# Patient Record
Sex: FEMALE | Race: WHITE | NOT HISPANIC OR LATINO | Employment: OTHER | URBAN - METROPOLITAN AREA
[De-identification: names, ages, dates, MRNs, and addresses within clinical notes are randomized per-mention and may not be internally consistent; named-entity substitution may affect disease eponyms.]

---

## 2017-09-08 ENCOUNTER — HOSPITAL ENCOUNTER (EMERGENCY)
Facility: CLINIC | Age: 42
Discharge: HOME | End: 2017-09-09
Attending: EMERGENCY MEDICINE | Admitting: EMERGENCY MEDICINE
Payer: MEDICAID

## 2017-09-08 ENCOUNTER — APPOINTMENT (OUTPATIENT)
Dept: GENERAL RADIOLOGY | Facility: CLINIC | Age: 42
End: 2017-09-08
Attending: EMERGENCY MEDICINE
Payer: MEDICAID

## 2017-09-08 DIAGNOSIS — J20.9 ACUTE BRONCHITIS, UNSPECIFIED ORGANISM: ICD-10-CM

## 2017-09-08 DIAGNOSIS — J98.01 ACUTE BRONCHOSPASM: ICD-10-CM

## 2017-09-08 LAB
ANION GAP SERPL CALCULATED.3IONS-SCNC: 6 MMOL/L (ref 3–14)
BASOPHILS # BLD AUTO: 0.1 10E9/L (ref 0–0.2)
BASOPHILS NFR BLD AUTO: 0.6 %
BUN SERPL-MCNC: 18 MG/DL (ref 7–30)
CALCIUM SERPL-MCNC: 9.1 MG/DL (ref 8.5–10.1)
CHLORIDE SERPL-SCNC: 105 MMOL/L (ref 94–109)
CO2 SERPL-SCNC: 25 MMOL/L (ref 20–32)
CREAT SERPL-MCNC: 0.84 MG/DL (ref 0.52–1.04)
DIFFERENTIAL METHOD BLD: ABNORMAL
EOSINOPHIL # BLD AUTO: 0.7 10E9/L (ref 0–0.7)
EOSINOPHIL NFR BLD AUTO: 5.2 %
ERYTHROCYTE [DISTWIDTH] IN BLOOD BY AUTOMATED COUNT: 13.6 % (ref 10–15)
GFR SERPL CREATININE-BSD FRML MDRD: 75 ML/MIN/1.7M2
GLUCOSE SERPL-MCNC: 95 MG/DL (ref 70–99)
HCT VFR BLD AUTO: 39.5 % (ref 35–47)
HGB BLD-MCNC: 13 G/DL (ref 11.7–15.7)
IMM GRANULOCYTES # BLD: 0 10E9/L (ref 0–0.4)
IMM GRANULOCYTES NFR BLD: 0.2 %
LYMPHOCYTES # BLD AUTO: 4.2 10E9/L (ref 0.8–5.3)
LYMPHOCYTES NFR BLD AUTO: 31.5 %
MCH RBC QN AUTO: 28.3 PG (ref 26.5–33)
MCHC RBC AUTO-ENTMCNC: 32.9 G/DL (ref 31.5–36.5)
MCV RBC AUTO: 86 FL (ref 78–100)
MONOCYTES # BLD AUTO: 1 10E9/L (ref 0–1.3)
MONOCYTES NFR BLD AUTO: 7.7 %
NEUTROPHILS # BLD AUTO: 7.2 10E9/L (ref 1.6–8.3)
NEUTROPHILS NFR BLD AUTO: 54.8 %
NRBC # BLD AUTO: 0 10*3/UL
NRBC BLD AUTO-RTO: 0 /100
PLATELET # BLD AUTO: 288 10E9/L (ref 150–450)
POTASSIUM SERPL-SCNC: 3.7 MMOL/L (ref 3.4–5.3)
RBC # BLD AUTO: 4.6 10E12/L (ref 3.8–5.2)
SODIUM SERPL-SCNC: 136 MMOL/L (ref 133–144)
WBC # BLD AUTO: 13.2 10E9/L (ref 4–11)

## 2017-09-08 PROCEDURE — 85025 COMPLETE CBC W/AUTO DIFF WBC: CPT | Performed by: EMERGENCY MEDICINE

## 2017-09-08 PROCEDURE — 71020 XR CHEST 2 VW: CPT

## 2017-09-08 PROCEDURE — 94640 AIRWAY INHALATION TREATMENT: CPT

## 2017-09-08 PROCEDURE — 80048 BASIC METABOLIC PNL TOTAL CA: CPT | Performed by: EMERGENCY MEDICINE

## 2017-09-08 PROCEDURE — 99285 EMERGENCY DEPT VISIT HI MDM: CPT | Mod: 25

## 2017-09-08 PROCEDURE — 93005 ELECTROCARDIOGRAM TRACING: CPT

## 2017-09-08 ASSESSMENT — ENCOUNTER SYMPTOMS
COUGH: 1
CHEST TIGHTNESS: 1
WHEEZING: 1
FEVER: 0

## 2017-09-08 NOTE — ED AVS SNAPSHOT
[facility] Emergency Department    [address]    [facility] [address]    Phone:  [phone]    Fax:  [phone]                                       Prasanth Coy   MRN: [id number]    Department:  [facility] Emergency Department   Date of Visit:  9/8/2017           After Visit Summary Signature Page     I have received my discharge instructions, and my questions have been answered. I have discussed any challenges I see with this plan with the nurse or doctor.    ..........................................................................................................................................  Patient/Patient Representative Signature      ..........................................................................................................................................  Patient Representative Print Name and Relationship to Patient    ..................................................               ................................................  Date                                            Time    ..........................................................................................................................................  Reviewed by Signature/Title    ...................................................              ..............................................  Date                                                            Time

## 2017-09-08 NOTE — ED AVS SNAPSHOT
[facility] Emergency Department    [address]    [facility] [address]    Phone:  [phone]    Fax:  [phone]                                       Prasanth Coy   MRN: [id number]    Department:  [facility] Emergency Department   Date of Visit:  9/8/2017           Patient Information     Date Of Birth          1975        Your diagnoses for this visit were:     Acute bronchitis, unspecified organism     Acute bronchospasm        You were seen by Sandro Adler MD.      Follow-up Information     Follow up with [facility] In 3 days.    Specialties:  Sports Medicine, Pain Management, Obstetrics & Gynecology, Pediatrics, Internal Medicine, Nephrology    Contact information:    [address] [place] [address]  [facility] [address] [phone]        Discharge Instructions       Discharge Instructions  Bronchitis    You were seen today for a chest infection or inflammation. If your provider decided this was due to a bacterial infection, you may need an antibiotic. Sometimes these are caused by a virus, and then an antibiotic will not help.     Generally, every Emergency Department visit should have a follow-up clinic visit with either a primary or a specialty clinic/provider. Please follow-up as instructed by your emergency provider today.    Return to the Emergency Department if:    Your breathing gets much worse.    You are very weak, or feel much more ill.    You develop new symptoms, such as chest pain.    You cough up blood.    You are vomiting (throwing up) enough that you cannot keep fluids or your medicine down.    What can I do to help myself?    Fill any prescriptions the provider gave you and take them right away--especially antibiotics. Be sure to finish the whole antibiotic prescription.    You may be given a prescription for an inhaler, which can help loosen tight air passages.  Use this as needed, but not more  often than directed. Inhalers work much better when used with a spacer.     You may be given a prescription for a steroid to reduce inflammation. Used long-term, these can have side effects, but for short-term use they are safe. You may notice restlessness or increased appetite.        You may use non-prescription cough or cold medicines. Cough medicines may help, but don t make the cough go away completely.     Avoid smoke, because this can make your symptoms worse. If you smoke, this may be a good time to quit! Consider using nicotine lozenges, gum, or patches to reduce cravings.     If you have a fever, Tylenol  (acetaminophen), Motrin  (ibuprofen), or Advil  (ibuprofen) may help bring fever down and may help you feel more comfortable. Be sure to read and follow the package directions, and ask your provider if you have questions.    Be sure to get your flu shot each year.  For certain ages, the pneumonia shot can help prevent pneumonia.  If you were given a prescription for medicine here today, be sure to read all of the information (including the package insert) that comes with your prescription.  This will include important information about the medicine, its side effects, and any warnings that you need to know about.  The pharmacist who fills the prescription can provide more information and answer questions you may have about the medicine.  If you have questions or concerns that the pharmacist cannot address, please call or return to the Emergency Department.     Remember that you can always come back to the Emergency Department if you are not able to see your regular provider in the amount of time listed above, if you get any new symptoms, or if there is anything that worries you.      24 Hour Appointment Hotline       To make an appointment at any [facility] call [phone] ([phone]). If you don't have a family doctor or clinic, we will help you find one. [facility] are conveniently  located to serve the needs of you and your family.             Review of your medicines      START taking        Dose / Directions Last dose taken    albuterol 108 (90 BASE) MCG/ACT Inhaler   Commonly known as:  PROAIR HFA/PROVENTIL HFA/VENTOLIN HFA   Dose:  2 puff   Quantity:  1 Inhaler        Inhale 2 puffs into the lungs every 6 hours as needed for shortness of breath / dyspnea or wheezing   Refills:  0        doxycycline 100 MG capsule   Commonly known as:  VIBRAMYCIN   Dose:  100 mg   Quantity:  14 capsule        Take 1 capsule (100 mg) by mouth 2 times daily for 7 days   Refills:  0          Our records show that you are taking the medicines listed below. If these are incorrect, please call your family doctor or clinic.        Dose / Directions Last dose taken    ALBUTEROL IN        Refills:  0                Prescriptions were sent or printed at these locations (2 Prescriptions)                   Other Prescriptions                Printed at Department/Unit printer (2 of 2)         albuterol (PROAIR HFA/PROVENTIL HFA/VENTOLIN HFA) 108 (90 BASE) MCG/ACT Inhaler               doxycycline (VIBRAMYCIN) 100 MG capsule                Procedures and tests performed during your visit     Basic metabolic panel (BMP)    CBC + differential    EKG 12 lead    XR Chest 2 Views      Orders Needing Specimen Collection     None      Pending Results     Date and Time Order Name Status Description    9/8/2017 2305 XR Chest 2 Views Preliminary             Pending Culture Results     No orders found for last 3 day(s).            Pending Results Instructions     If you had any lab results that were not finalized at the time of your Discharge, you can call the ED Lab Result RN at [phone]. You will be contacted by this team for any positive Lab results or changes in treatment. The nurses are available 7 days a week from 10A to 6:30P.  You can leave a message 24 hours per day and they will return your call.        Test Results  From Your Hospital Stay        9/8/2017 11:50 PM      Narrative     XR CHEST 2 VW  9/8/2017 11:36 PM      HISTORY: Dyspnea.     COMPARISON: None.    FINDINGS: The heart size is normal. The lungs are clear. No  pneumothorax or pleural effusion.        Impression     IMPRESSION: No acute abnormality.         9/8/2017 11:27 PM      Component Results     Component Value Ref Range & Units Status    WBC 13.2 (H) 4.0 - 11.0 10e9/L Final    RBC Count 4.60 3.8 - 5.2 10e12/L Final    Hemoglobin 13.0 11.7 - 15.7 g/dL Final    Hematocrit 39.5 35.0 - 47.0 % Final    MCV 86 78 - 100 fl Final    MCH 28.3 26.5 - 33.0 pg Final    MCHC 32.9 31.5 - 36.5 g/dL Final    RDW 13.6 10.0 - 15.0 % Final    Platelet Count 288 150 - 450 10e9/L Final    Diff Method Automated Method  Final    % Neutrophils 54.8 % Final    % Lymphocytes 31.5 % Final    % Monocytes 7.7 % Final    % Eosinophils 5.2 % Final    % Basophils 0.6 % Final    % Immature Granulocytes 0.2 % Final    Nucleated RBCs 0 0 /100 Final    Absolute Neutrophil 7.2 1.6 - 8.3 10e9/L Final    Absolute Lymphocytes 4.2 0.8 - 5.3 10e9/L Final    Absolute Monocytes 1.0 0.0 - 1.3 10e9/L Final    Absolute Eosinophils 0.7 0.0 - 0.7 10e9/L Final    Absolute Basophils 0.1 0.0 - 0.2 10e9/L Final    Abs Immature Granulocytes 0.0 0 - 0.4 10e9/L Final    Absolute Nucleated RBC 0.0  Final         9/8/2017 11:42 PM      Component Results     Component Value Ref Range & Units Status    Sodium 136 133 - 144 mmol/L Final    Potassium 3.7 3.4 - 5.3 mmol/L Final    Chloride 105 94 - 109 mmol/L Final    Carbon Dioxide 25 20 - 32 mmol/L Final    Anion Gap 6 3 - 14 mmol/L Final    Glucose 95 70 - 99 mg/dL Final    Urea Nitrogen 18 7 - 30 mg/dL Final    Creatinine 0.84 0.52 - 1.04 mg/dL Final    GFR Estimate 75 >60 mL/min/1.7m2 Final    Non  GFR Calc    GFR Estimate If Black >90 >60 mL/min/1.7m2 Final    African American GFR Calc    Calcium 9.1 8.5 - 10.1 mg/dL Final                Clinical  Quality Measure: Blood Pressure Screening     Your blood pressure was checked while you were in the emergency department today. The last reading we obtained was  BP: 102/72 . Please read the guidelines below about what these numbers mean and what you should do about them.  If your systolic blood pressure (the top number) is less than 120 and your diastolic blood pressure (the bottom number) is less than 80, then your blood pressure is normal. There is nothing more that you need to do about it.  If your systolic blood pressure (the top number) is 120-139 or your diastolic blood pressure (the bottom number) is 80-89, your blood pressure may be higher than it should be. You should have your blood pressure rechecked within a year by a primary care provider.  If your systolic blood pressure (the top number) is 140 or greater or your diastolic blood pressure (the bottom number) is 90 or greater, you may have high blood pressure. High blood pressure is treatable, but if left untreated over time it can put you at risk for heart attack, stroke, or kidney failure. You should have your blood pressure rechecked by a primary care provider within the next 4 weeks.  If your provider in the emergency department today gave you specific instructions to follow-up with your doctor or provider even sooner than that, you should follow that instruction and not wait for up to 4 weeks for your follow-up visit.        Thank you for choosing [facility]       Thank you for choosing [facility] for your care. Our goal is always to provide you with excellent care. Hearing back from our patients is one way we can continue to improve our services. Please take a few minutes to complete the written survey that you may receive in the mail after you visit with us. Thank you!        [employer] Information     [employer] lets you send messages to your doctor, view your test results, renew your prescriptions, schedule appointments and more. To sign up, go to  [email or web address] . Click on \"Log in\" on the left side of the screen, which will take you to the Welcome page. Then click on \"Sign up Now\" on the right side of the page.     You will be asked to enter the access code listed below, as well as some personal information. Please follow the directions to create your username and password.     Your access code is: [email or web address]  Expires: 2017 12:33 AM     Your access code will  in 90 days. If you need help or a new code, please call your [facility] clinic or [phone].        Care EveryWhere ID     This is your Care EveryWhere ID. This could be used by other organizations to access your [facility] medical records  [id number]        Equal Access to Services     RADHA GEE : Jaime Millan, vandana quinonez, jose raul alves, bora mendoza. So [facility] [phone].    ATENCIÓN: Si habla español, tiene a pink disposición servicios gratuitos de asistencia lingüística. Llame al [phone].    We comply with applicable federal civil rights laws and [place] laws. We do not discriminate on the basis of race, color, national origin, age, disability sex, sexual orientation or gender identity.            After Visit Summary       This is your record. Keep this with you and show to your community pharmacist(s) and doctor(s) at your next visit.                  "

## 2017-09-09 VITALS
DIASTOLIC BLOOD PRESSURE: 70 MMHG | HEART RATE: 74 BPM | TEMPERATURE: 98.2 F | OXYGEN SATURATION: 100 % | SYSTOLIC BLOOD PRESSURE: 118 MMHG | RESPIRATION RATE: 18 BRPM

## 2017-09-09 LAB — INTERPRETATION ECG - MUSE: NORMAL

## 2017-09-09 PROCEDURE — 25000125 ZZHC RX 250: Performed by: EMERGENCY MEDICINE

## 2017-09-09 RX ORDER — IPRATROPIUM BROMIDE AND ALBUTEROL SULFATE 2.5; .5 MG/3ML; MG/3ML
3 SOLUTION RESPIRATORY (INHALATION)
Status: DISCONTINUED | OUTPATIENT
Start: 2017-09-09 | End: 2017-09-09 | Stop reason: HOSPADM

## 2017-09-09 RX ORDER — ALBUTEROL SULFATE 90 UG/1
2 AEROSOL, METERED RESPIRATORY (INHALATION) EVERY 6 HOURS PRN
Qty: 1 INHALER | Refills: 0 | Status: SHIPPED | OUTPATIENT
Start: 2017-09-09 | End: 2018-08-08

## 2017-09-09 RX ORDER — DOXYCYCLINE 100 MG/1
100 CAPSULE ORAL 2 TIMES DAILY
Qty: 14 CAPSULE | Refills: 0 | Status: SHIPPED | OUTPATIENT
Start: 2017-09-09 | End: 2017-09-16

## 2017-09-09 RX ORDER — IPRATROPIUM BROMIDE AND ALBUTEROL SULFATE 2.5; .5 MG/3ML; MG/3ML
3 SOLUTION RESPIRATORY (INHALATION)
Status: DISCONTINUED | OUTPATIENT
Start: 2017-09-09 | End: 2017-09-09

## 2017-09-09 RX ADMIN — IPRATROPIUM BROMIDE AND ALBUTEROL SULFATE 3 ML: .5; 3 SOLUTION RESPIRATORY (INHALATION) at 00:12

## 2017-09-09 NOTE — ED NOTES
Pt stated breathing is improved after nebulizer, VS remains stable, ABCs intact, will continue to monitor.

## 2017-09-09 NOTE — DISCHARGE INSTRUCTIONS
Discharge Instructions  Bronchitis    You were seen today for a chest infection or inflammation. If your provider decided this was due to a bacterial infection, you may need an antibiotic. Sometimes these are caused by a virus, and then an antibiotic will not help.     Generally, every Emergency Department visit should have a follow-up clinic visit with either a primary or a specialty clinic/provider. Please follow-up as instructed by your emergency provider today.    Return to the Emergency Department if:    Your breathing gets much worse.    You are very weak, or feel much more ill.    You develop new symptoms, such as chest pain.    You cough up blood.    You are vomiting (throwing up) enough that you cannot keep fluids or your medicine down.    What can I do to help myself?    Fill any prescriptions the provider gave you and take them right away--especially antibiotics. Be sure to finish the whole antibiotic prescription.    You may be given a prescription for an inhaler, which can help loosen tight air passages.  Use this as needed, but not more often than directed. Inhalers work much better when used with a spacer.     You may be given a prescription for a steroid to reduce inflammation. Used long-term, these can have side effects, but for short-term use they are safe. You may notice restlessness or increased appetite.        You may use non-prescription cough or cold medicines. Cough medicines may help, but don t make the cough go away completely.     Avoid smoke, because this can make your symptoms worse. If you smoke, this may be a good time to quit! Consider using nicotine lozenges, gum, or patches to reduce cravings.     If you have a fever, Tylenol  (acetaminophen), Motrin  (ibuprofen), or Advil  (ibuprofen) may help bring fever down and may help you feel more comfortable. Be sure to read and follow the package directions, and ask your provider if you have questions.    Be sure to get your flu shot each  year.  For certain ages, the pneumonia shot can help prevent pneumonia.  If you were given a prescription for medicine here today, be sure to read all of the information (including the package insert) that comes with your prescription.  This will include important information about the medicine, its side effects, and any warnings that you need to know about.  The pharmacist who fills the prescription can provide more information and answer questions you may have about the medicine.  If you have questions or concerns that the pharmacist cannot address, please call or return to the Emergency Department.     Remember that you can always come back to the Emergency Department if you are not able to see your regular provider in the amount of time listed above, if you get any new symptoms, or if there is anything that worries you.

## 2017-09-09 NOTE — ED NOTES
Pt here for coughing and sob, wheezing, feels flu like symptoms about a week ago. ABCs intact, gcs 15, AA&Ox4.

## 2017-09-09 NOTE — ED PROVIDER NOTES
History   Chief Complaint:  Shortness of Breath    HPI   Prasanth Coy is a 42 year old female with a history of asthma who presents to the ED for evaluation of wheezing, cough, and chest tightness. The patient reports she had cold/flu like symptoms earlier this week and gradual onset of cough, wheezing, and chest tightness today. The patient is currently out of her prescribed albuterol inhaler. She denies fever.     Allergies:  No known drug allergies    Medications:    ALBUTEROL IN     Past Medical History:    Asthma     Past Surgical History:    History reviewed. No pertinent surgical history.    Family History:    History reviewed. No pertinent family history.     Social History:  PCP No primary care provider on file.    Review of Systems   Constitutional: Negative for fever.   Respiratory: Positive for cough, chest tightness and wheezing.    All other systems reviewed and are negative.    Physical Exam   Patient Vitals for the past 24 hrs:   BP Temp Temp src Pulse Heart Rate Resp SpO2   09/08/17 2302 133/67 98.2  F (36.8  C) Oral 74 74 20 100 %     Physical Exam  Constitutional: Alert, attentive  HENT:    Nose: Nose normal.    Mouth/Throat: Oropharynx is clear, mucous membranes are moist   Eyes: EOM are normal. Pupils are equal, round, and reactive to light.   CV: regular rate and rhythm; no murmurs, rubs or gallups  Chest: Effort normal, trace end-expiratory wheezes bilaterally  GI:  There is no tenderness. No distension. Normal bowel sounds  MSK: Normal range of motion.   Neurological: Alert, attentive  Skin: Skin is warm and dry.      Emergency Department Course   ECG (23:04:18):  Rate 66 bpm. OR interval 128. QRS duration 86. QT/QTc 390/408. P-R-T axes 48 22 15. Normal sinus rhythm. Normal ECG. Interpreted at 2309 by Sandro Alder MD.    Imaging:  Radiographic findings were communicated with the patient who voiced understanding of the findings.  XR chest 2 views:  No acute abnormality.  As read by  Radiology.    Laboratory:  CBC: WBC 13.2(H) o/w WNL (HGB 13.0, )   BMP: WNL (Creatinine 0.84)    Interventions:  0012 Duoneb 3 mL nebulization     Emergency Department Course:  Past medical records, nursing notes, and vitals reviewed.  2300: I performed an exam of the patient and obtained history, as documented above.  IV inserted and blood drawn.  The patient was sent for a chest xray while in the emergency department, findings above.  2304 ECG was obtained - see results above.   0003: I rechecked and updated the patient.    0012 Duoneb 3 mL nebulization   0030: I rechecked the patient.  Findings and plan explained to the Patient. Patient discharged home with instructions regarding supportive care, medications, and reasons to return. The importance of close follow-up was reviewed.   Impression & Plan    Medical Decision Making:  This is a 42 year old female who presents for evaluation of increasing URI symptoms with mild dyspnea and chest tightness today similar to previous episodes of asthma. She did not have an inhaler so she presents for evaluation. There is trace expiratory wheezing on exam and no focal abnormalities. Given the above, chest xray was performed to rule out pneumonia and is negative. She feels better after a single neb. Steroids are not indicated at this time so I will prescribe an albuterol inhaler and will treat this as acute bacterial bronchitis. She is PERC negative essentially ruling out PE, and EKG is unremarkable. I do believe she is safe for discharge at this time with the above plan and primary care follow up in 3-5 days. She is to return with worsening shortness of breath, fever, chest pain, or any other concerns.     Diagnosis:    ICD-10-CM   1. Acute bronchitis, unspecified organism J20.9   2. Acute bronchospasm J98.01     Disposition:  Discharged to home    Discharge Medications:  New Prescriptions    ALBUTEROL (PROAIR HFA/PROVENTIL HFA/VENTOLIN HFA) 108 (90 BASE) MCG/ACT  INHALER    Inhale 2 puffs into the lungs every 6 hours as needed for shortness of breath / dyspnea or wheezing    DOXYCYCLINE (VIBRAMYCIN) 100 MG CAPSULE    Take 1 capsule (100 mg) by mouth 2 times daily for 7 days     Raven Kim  9/8/2017   [facility] EMERGENCY DEPARTMENT    I, Raven Kim, am serving as a scribe at 11:01 PM on 9/8/2017 to document services personally performed by Sandro Adler MD based on my observations and the provider's statements to me.        Sandro Adler MD  09/09/17 0051

## 2018-03-26 ENCOUNTER — APPOINTMENT (OUTPATIENT)
Dept: GENERAL RADIOLOGY | Facility: CLINIC | Age: 43
End: 2018-03-26
Attending: INTERNAL MEDICINE
Payer: COMMERCIAL

## 2018-03-26 ENCOUNTER — HOSPITAL ENCOUNTER (EMERGENCY)
Facility: CLINIC | Age: 43
Discharge: HOME | End: 2018-03-27
Attending: INTERNAL MEDICINE | Admitting: INTERNAL MEDICINE
Payer: COMMERCIAL

## 2018-03-26 VITALS
OXYGEN SATURATION: 97 % | RESPIRATION RATE: 18 BRPM | DIASTOLIC BLOOD PRESSURE: 73 MMHG | TEMPERATURE: 98.2 F | WEIGHT: 165 LBS | SYSTOLIC BLOOD PRESSURE: 109 MMHG

## 2018-03-26 DIAGNOSIS — J10.1 INFLUENZA B: ICD-10-CM

## 2018-03-26 PROCEDURE — 87804 INFLUENZA ASSAY W/OPTIC: CPT | Performed by: INTERNAL MEDICINE

## 2018-03-26 PROCEDURE — 71046 X-RAY EXAM CHEST 2 VIEWS: CPT

## 2018-03-26 PROCEDURE — 99284 EMERGENCY DEPT VISIT MOD MDM: CPT | Mod: 25

## 2018-03-26 ASSESSMENT — ENCOUNTER SYMPTOMS
FATIGUE: 1
MYALGIAS: 1
COUGH: 1
FEVER: 1
WEAKNESS: 1

## 2018-03-26 NOTE — ED AVS SNAPSHOT
[facility] Emergency Department    [address]    [facility] [address]    Phone:  [phone]    Fax:  [phone]                                       Prasanth Coy   MRN: [id number]    Department:  [facility] Emergency Department   Date of Visit:  3/26/2018           After Visit Summary Signature Page     I have received my discharge instructions, and my questions have been answered. I have discussed any challenges I see with this plan with the nurse or doctor.    ..........................................................................................................................................  Patient/Patient Representative Signature      ..........................................................................................................................................  Patient Representative Print Name and Relationship to Patient    ..................................................               ................................................  Date                                            Time    ..........................................................................................................................................  Reviewed by Signature/Title    ...................................................              ..............................................  Date                                                            Time

## 2018-03-26 NOTE — ED AVS SNAPSHOT
[facility] Emergency Department    [address] [place] [address]    Phone:  [phone]    Fax:  [phone]                                       Prasanth Coy   MRN: [id number]    Department:  [facility] Emergency Department   Date of Visit:  3/26/2018           Patient Information     Date Of Birth          1975        Your diagnoses for this visit were:     Influenza B        You were seen by Namrata Arellano MD.        Discharge Instructions       Discharge Instructions  Influenza    You were diagnosed today with influenza or influenza like illness.  Influenza is a respiratory illness caused by influenza A or B viruses.  Influenza causes fever, headache, muscle aches, and fatigue.  These symptoms start one to four days after you have been around a person with this illness.  People with influenza commonly have a dry cough, sore throat, and a runny nose.   Influenza is spread through sneezing and coughing and can live on surfaces for several days.  It is usually contagious for 5 days but in some cases up to 10 days and often affects several family members. If you have a family member who is less than 2 years old, older than 65 years old, pregnant or has a serious medical condition, they should be seen right away by a physician to decide if they should take preventative medications.      Return to the Emergency Department if:    You have trouble breathing.    You develop pain in your chest.    You have signs of being dehydrated, such as dizziness or unable to urinate at least three times daily.    You feel confused.    You cannot stop vomiting or you cannot drink enough fluids.    In children, you should seek help if the child has any of the above or if child:    Has blue or purplish skin color.    Is so irritable that he or she does not want to be held.    Does not have tears when crying (in infants) or does not urinate at least three times daily.    Does  not wake up easily.    Follow-up with your doctor if you are not improving after 5 days.    What can I do to help myself?    Rest.    Fluids -- Drink hydrating solutions such as Gatorade  or Pedialyte  as often as you can. If you are drinking enough, you should pass urine at least every eight hours.    Tylenol  (acetaminophen) and Advil  (ibuprofen) can relieve fever, headache, and muscle aches. Do not give aspirin to children under 18 years old.     Antiviral treatment -- Antiviral medicines do not make the flu symptoms go away immediately.  They have only been shown to make the symptoms go away 12 to 24 hours sooner than they would without treatment.       Antibiotics -- Antibiotics are NOT useful for treating viral illnesses such as influenza. Antibiotics should only be used if there is a bacterial complication of the flu such as bacterial pneumonia, ear infection, or sinusitis.    Because you were diagnosed with a flu like illness you are very contagious.  This means you cannot work, attend school or  for at least 24 hours or until you no longer have a fever.  If you were given a prescription for medicine here today, be sure to read all of the information (including the package insert) that comes with your prescription.  This will include important information about the medicine, its side effects, and any warnings that you need to know about.  The pharmacist who fills the prescription can provide more information and answer questions you may have about the medicine.  If you have questions or concerns that the pharmacist cannot address, please call or return to the Emergency Department.     Remember that you can always come back to the Emergency Department if you are not able to see your regular doctor in the amount of time listed above, if you get any new symptoms, or if there is anything that worries you.      24 Hour Appointment Hotline       To make an appointment at any [facility], call  [phone] ([phone]). If you don't have a family doctor or clinic, we will help you find one. [facility] clinics are conveniently located to serve the needs of you and your family.             Review of your medicines      START taking        Dose / Directions Last dose taken    oseltamivir 75 MG capsule   Commonly known as:  TAMIFLU   Dose:  75 mg   Quantity:  10 capsule        Take 1 capsule (75 mg) by mouth 2 times daily for 5 days   Refills:  0          Our records show that you are taking the medicines listed below. If these are incorrect, please call your family doctor or clinic.        Dose / Directions Last dose taken    albuterol 108 (90 BASE) MCG/ACT Inhaler   Commonly known as:  PROAIR HFA/PROVENTIL HFA/VENTOLIN HFA   Dose:  2 puff   Quantity:  1 Inhaler        Inhale 2 puffs into the lungs every 6 hours as needed for shortness of breath / dyspnea or wheezing   Refills:  0        ALBUTEROL IN        Refills:  0                Prescriptions were sent or printed at these locations (1 Prescription)                   Other Prescriptions                Printed at Department/Unit printer (1 of 1)         oseltamivir (TAMIFLU) 75 MG capsule                Procedures and tests performed during your visit     Chest XR,  PA & LAT    Influenza A/B antigen      Orders Needing Specimen Collection     None      Pending Results     Date and Time Order Name Status Description    3/26/2018 2336 Chest XR,  PA & LAT Preliminary             Pending Culture Results     No orders found for last 3 day(s).            Pending Results Instructions     If you had any lab results that were not finalized at the time of your Discharge, you can call the ED Lab Result RN at [phone]. You will be contacted by this team for any positive Lab results or changes in treatment. The nurses are available 7 days a week from 10A to 6:30P.  You can leave a message 24 hours per day and they will return your call.        Test Results  From Your Hospital Stay        3/27/2018 12:14 AM      Component Results     Component Value Ref Range & Units Status    Influenza A/B Agn Specimen Nasal  Final    Influenza A Negative NEG^Negative Final    Influenza B Positive (A) NEG^Negative Final    Test results must be correlated with clinical data. If necessary, results   should be confirmed by a molecular assay or viral culture.           3/27/2018 12:06 AM      Narrative     XR CHEST 2 VW  3/26/2018 11:55 PM     INDICATION: Cough, fever.    COMPARISON: 9/8/2017.        Impression     IMPRESSION: No infiltrates or other acute findings. Heart size is  within normal limits.                Clinical Quality Measure: Blood Pressure Screening     Your blood pressure was checked while you were in the emergency department today. The last reading we obtained was  BP: 109/73 . Please read the guidelines below about what these numbers mean and what you should do about them.  If your systolic blood pressure (the top number) is less than 120 and your diastolic blood pressure (the bottom number) is less than 80, then your blood pressure is normal. There is nothing more that you need to do about it.  If your systolic blood pressure (the top number) is 120-139 or your diastolic blood pressure (the bottom number) is 80-89, your blood pressure may be higher than it should be. You should have your blood pressure rechecked within a year by a primary care provider.  If your systolic blood pressure (the top number) is 140 or greater or your diastolic blood pressure (the bottom number) is 90 or greater, you may have high blood pressure. High blood pressure is treatable, but if left untreated over time it can put you at risk for heart attack, stroke, or kidney failure. You should have your blood pressure rechecked by a primary care provider within the next 4 weeks.  If your provider in the emergency department today gave you specific instructions to follow-up with your doctor or  "provider even sooner than that, you should follow that instruction and not wait for up to 4 weeks for your follow-up visit.        Thank you for choosing [facility]       Thank you for choosing [facility] for your care. Our goal is always to provide you with excellent care. Hearing back from our patients is one way we can continue to improve our services. Please take a few minutes to complete the written survey that you may receive in the mail after you visit with us. Thank you!        [employer] Information     [employer] lets you send messages to your doctor, view your test results, renew your prescriptions, schedule appointments and more. To sign up, go to [email or web address] . Click on \"Log in\" on the left side of the screen, which will take you to the Welcome page. Then click on \"Sign up Now\" on the right side of the page.     You will be asked to enter the access code listed below, as well as some personal information. Please follow the directions to create your username and password.     Your access code is: [email or web address]  Expires: 2018 12:29 AM     Your access code will  in 90 days. If you need help or a new code, please call your [facility] clinic or [phone].        Care EveryWhere ID     This is your Care EveryWhere ID. This could be used by other organizations to access your [facility] medical records  [id number]        Equal Access to Services     RADHA GEE : Hadii letty Millan, waaxda cristiano, qaybta kaalmabobbi alves, bora mendoza. So [facility] [phone].    ATENCIÓN: Si habla español, tiene a pink disposición servicios gratuitos de asistencia lingüística. Franco al [phone].    We comply with applicable federal civil rights laws and [place] laws. We do not discriminate on the basis of race, color, national origin, age, disability, sex, sexual orientation, or gender identity.            After Visit Summary       This is your record. Keep this with " you and show to your community pharmacist(s) and doctor(s) at your next visit.

## 2018-03-26 NOTE — LETTER
[facility] EMERGENCY DEPARTMENT  [address]  [facility] [address]  Phone: [phone]  Fax: [phone]    March 27, 2018        Prasanth Coy  [address] [facility] [address]  [facility] [address]          To whom it may concern:  Not to work for 2 days.    RE: Prasanth Coy    {WORK NOTE CHOICES:249068}    Please contact me for questions or concerns.      Sincerely, DR. Del Cidt        No name on file.

## 2018-03-27 LAB
FLUAV+FLUBV AG SPEC QL: NEGATIVE
FLUAV+FLUBV AG SPEC QL: POSITIVE
SPECIMEN SOURCE: ABNORMAL

## 2018-03-27 RX ORDER — OSELTAMIVIR PHOSPHATE 75 MG/1
75 CAPSULE ORAL 2 TIMES DAILY
Qty: 10 CAPSULE | Refills: 0 | Status: SHIPPED | OUTPATIENT
Start: 2018-03-27 | End: 2018-04-01

## 2018-03-27 NOTE — ED PROVIDER NOTES
History     Chief Complaint:  Fever    The history is provided by the patient.      Prasanth Coy is a 43 year old female who presents with a fever. The patient reports that she has had a fever, myalgias, and productive cough since 3/23. She states that her fever has ranged from  and it was at a high yesterday evening at 103. She also endorses weakness and fatigue with her symptoms. Of note, the patient's daughter was sick with a viral flu-like illness 1 week ago. She has no other medical concerns.    Allergies:  No known drug allergies      Medications:    Albuterol inhaler    Past Medical History:    Uncomplicated asthma    Past Surgical History:    C section  Orthopedic surgery    Family History:    History reviewed. No pertinent family history.      Social History:  Presents with significant other  Tobacco use: Never  Alcohol use: No  PCP: Physician No Ref-Primary    Marital Status:  Single      Review of Systems   Constitutional: Positive for fatigue and fever.   Respiratory: Positive for cough.    Musculoskeletal: Positive for myalgias.   Neurological: Positive for weakness.   All other systems reviewed and are negative.    Physical Exam     Patient Vitals for the past 24 hrs:   BP Temp Temp src Heart Rate Resp SpO2 Weight   03/26/18 2313 109/73 98.2  F (36.8  C) Temporal 135 18 97 % 74.8 kg (165 lb)      Physical Exam   Constitutional: She is cooperative.   HENT:   Right Ear: Tympanic membrane normal.   Left Ear: Tympanic membrane normal.   Mouth/Throat: Oropharynx is clear and moist and mucous membranes are normal.   Eyes: Conjunctivae are normal.   Neck: Normal range of motion.   Cardiovascular: Regular rhythm and normal heart sounds.    Pulmonary/Chest: Effort normal and breath sounds normal.   Abdominal: Soft. Normal appearance and bowel sounds are normal. There is no rebound and no guarding.   Musculoskeletal: Normal range of motion.   Lymphadenopathy:     She has no cervical adenopathy.    Neurological: She is alert.   Skin: Skin is warm and dry.   Psychiatric: She has a normal mood and affect.       Emergency Department Course   Imaging:  Radiographic findings were communicated with the patient who voiced understanding of the findings.  Chest XR, PA & LAT  IMPRESSION: No infiltrates or other acute findings. Heart size is within normal limits.    NELLI CASANOVA MD    Imaging independently reviewed and agree with radiologist interpretation.      Laboratory:  Influenza A/B antigen: Influenza B Positive (A)    Emergency Department Course:  Past medical records, nursing notes, and vitals reviewed.  2326: I performed an exam of the patient and obtained history, as documented above.      0021: I rechecked the patient. Findings and plan explained to the Patient. Patient discharged home with instructions regarding supportive care, medications, and reasons to return. The importance of close follow-up was reviewed.      I personally reviewed the laboratory results with the patient and answered all related questions prior to discharge.   Impression & Plan    Medical Decision Making:  Prasanth Cyo is a 43 year old female who presents with fever, cough, and myalgias. Influenza B test is positive consistent with her clinical symptoms. Although she is outside the traditional treatment window given her severe symptoms she prefers to try Tamiflu. She is not hypoxic and able to tolerate oral fluids. I think she is appropriate for outpatient management. Chest xray shows no evidence of pneumonia. Influenza instructions provided.      Diagnosis:    ICD-10-CM    1. Influenza B J10.1        Disposition:  Discharged to home with plan as outlined.    Discharge Medications:  Discharge Medication List as of 3/27/2018 12:29 AM      START taking these medications    Details   oseltamivir (TAMIFLU) 75 MG capsule Take 1 capsule (75 mg) by mouth 2 times daily for 5 days, Disp-10 capsule, R-0, Local Print               Mario  Brigida  3/26/2018   [facility] EMERGENCY DEPARTMENT  I, Mario Brigida, am serving as a scribe at 11:26 PM on 3/26/2018 to document services personally performed by Namrata Arellano MD based on my observations and the provider's statements to me.       Namrata Arellano MD  03/27/18 020

## 2018-03-27 NOTE — ED NOTES
Alert and oriented x 3 airway,breathing and circulation intact, fever, body aches and cough for 3 days

## 2018-05-03 ENCOUNTER — HOSPITAL ENCOUNTER (INPATIENT)
Facility: CLINIC | Age: 43
LOS: 3 days | Discharge: HOME | DRG: 885 | End: 2018-05-07
Attending: EMERGENCY MEDICINE | Admitting: PSYCHIATRY & NEUROLOGY
Payer: COMMERCIAL

## 2018-05-03 DIAGNOSIS — F22 PARANOID STATE (H): ICD-10-CM

## 2018-05-03 DIAGNOSIS — R44.3 HALLUCINATION: ICD-10-CM

## 2018-05-03 DIAGNOSIS — F41.9 ANXIETY: ICD-10-CM

## 2018-05-03 DIAGNOSIS — F32.3 SEVERE SINGLE CURRENT EPISODE OF MAJOR DEPRESSIVE DISORDER, WITH PSYCHOTIC FEATURES (H): Primary | ICD-10-CM

## 2018-05-03 DIAGNOSIS — R44.3 HALLUCINATIONS: ICD-10-CM

## 2018-05-03 PROCEDURE — 99285 EMERGENCY DEPT VISIT HI MDM: CPT | Mod: 25 | Performed by: EMERGENCY MEDICINE

## 2018-05-03 PROCEDURE — 99285 EMERGENCY DEPT VISIT HI MDM: CPT | Mod: Z6 | Performed by: EMERGENCY MEDICINE

## 2018-05-03 PROCEDURE — 90791 PSYCH DIAGNOSTIC EVALUATION: CPT

## 2018-05-03 NOTE — IP AVS SNAPSHOT
MRN:[id number]                      After Visit Summary   5/3/2018    Prasanth Coy    MRN: [id number]           Thank you!     Thank you for choosing [facility] for your care. Our goal is always to provide you with excellent care.        Patient Information     Date Of Birth          1975        Designated Caregiver       Most Recent Value    Caregiver    Will someone help with your care after discharge? no      About your hospital stay     You were admitted on:  May 4, 2018 You last received care in the:  [facility]    You were discharged on:  May 7, 2018        Reason for your hospital stay       Depression                  Who to Call     For medical emergencies, please call 911.  For non-urgent questions about your medical care, please call your primary care provider or clinic, [phone]          Attending Provider     Provider Specialty    Sarahy Wiggins MD Emergency Medicine    Dong, Ian Argueta MD Psychiatry    Desrosmargarita, Swapnil Miller MD Psychiatry       Primary Care Provider Office Phone # Fax #    [facility] [phone]      After Care Instructions     Activity       Your activity upon discharge: activity as tolerated            Diet       Follow this diet upon discharge: Orders Placed This Encounter      Regular Diet Adult            Discharge Instructions       1. Please do not harm yourself or others.  2. Please continue to take your medications.  3. Please follow up with your outpatient care team.  4. Please do not take drugs or alcohol as this will worsen your condition.  5. Please do not take more than the prescribed doses of medications as this may make them dangerous.   6. Please follow your safety plan of action.  7. Please call crisis if having trouble.  8. If having thoughts of harming self or others please come in to the emergency department as soon as possible.                  Further instructions from your care team         Behavioral Discharge Planning and Instructions      Summary:  You were admitted on 5/3/2018  due to auditory hallucinations, delusions, depression.  You were treated by Dr Ian Cornejo MD and discharged on ***/***/**  from [facility] to your home.      Principal Diagnosis:       Health Care Follow-up Appointments:   Dr. Lauren Abraham on Monday, May 21st at 7:30am (paperwork) and then meet with the provider for initial assessment from 8-9am.  [facility]  [address]  [place], [place]  [phone]  FAX - [phone]                      Attend all scheduled appointments with your outpatient providers. Call at least 24 hours in advance if you need to reschedule an appointment to ensure continued access to your outpatient providers.   Major Treatments, Procedures and Findings:  You were provided with: a psychiatric assessment, assessed for medical stability, medication evaluation and/or management, group therapy and milieu management    Symptoms to Report: increased confusion, losing more sleep, mood getting worse or thoughts of suicide    Early warning signs can include: increased depression or anxiety sleep disturbances increased thoughts or behaviors of suicide or self-harm  increased unusual thinking, such as paranoia or hearing voices    Safety and Wellness:  Take all medicines as directed.  Make no changes unless your doctor suggests them.      Follow treatment recommendations.  Refrain from alcohol and non-prescribed drugs.  If there is a concern for safety, call 911.    Resources:   Crisis Intervention: [phone] or [phone] (TTY: [phone]).  Call anytime for help.    The treatment team has appreciated the opportunity to work with you.     If you have any questions or concerns our unit number is [phone].      Pending Results     No orders found from 5/1/2018 to 5/4/2018.            Statement of Approval     Ordered           "18 1557  I have reviewed and agree with all the recommendations and orders detailed in this document.  EFFECTIVE NOW     Approved and electronically signed by:  Swapnil Snow MD             Admission Information     Date & Time Provider Department Dept. Phone    5/3/2018 Swapnil Snow MD  [facility] [phone]      Your Vitals Were     Blood Pressure Pulse Temperature Respirations Height Weight    124/81 88 97.5  F (36.4  C) (Tympanic) 16 1.676 m (5' 6\") 74.3 kg (163 lb 12.8 oz)    Last Period Pulse Oximetry BMI (Body Mass Index)             2018 98% 26.44 kg/m2         MyChart Information     [employer] lets you send messages to your doctor, view your test results, renew your prescriptions, schedule appointments and more. To sign up, go to [email or web address] . Click on \"Log in\" on the left side of the screen, which will take you to the Welcome page. Then click on \"Sign up Now\" on the right side of the page.     You will be asked to enter the access code listed below, as well as some personal information. Please follow the directions to create your username and password.     Your access code is: [email or web address]  Expires: 2018 12:29 AM     Your access code will  in 90 days. If you need help or a new code, please call your [facility] clinic or [phone].        Care EveryWhere ID     This is your Care EveryWhere ID. This could be used by other organizations to access your [facility] medical records  [id number]        Equal Access to Services     [facility]: Hadii letty burton Sothom, waaxda luqadaha, qaybta kaalmabora thornton . So [facility] [phone].    ATENCIÓN: Si habla español, tiene a pink disposición servicios gratuitos de asistencia lingüística. Llame al [phone].    We comply with applicable federal civil rights laws and [place] laws. We do not discriminate on the basis of race, color, national origin, age, " disability, sex, sexual orientation, or gender identity.               Review of your medicines      START taking        Dose / Directions    escitalopram 5 MG tablet   Commonly known as:  LEXAPRO   Used for:  Anxiety        Dose:  5 mg   Take 1 tablet (5 mg) by mouth daily   Quantity:  30 tablet   Refills:  1       hydrOXYzine 25 MG tablet   Commonly known as:  ATARAX   Used for:  Anxiety        Dose:  25 mg   Take 1 tablet (25 mg) by mouth every 4 hours as needed for anxiety   Quantity:  30 tablet   Refills:  1       mirtazapine 7.5 MG Tabs tablet   Commonly known as:  REMERON   Used for:  Anxiety        Dose:  7.5 mg   Take 1 tablet (7.5 mg) by mouth At Bedtime   Quantity:  30 tablet   Refills:  1       risperiDONE 0.25 MG tablet   Commonly known as:  risperDAL        Dose:  0.25 mg   Take 1 tablet (0.25 mg) by mouth 2 times daily   Quantity:  60 tablet   Refills:  1         CONTINUE these medicines which have NOT CHANGED        Dose / Directions    albuterol 108 (90 Base) MCG/ACT Inhaler   Commonly known as:  PROAIR HFA/PROVENTIL HFA/VENTOLIN HFA        Dose:  2 puff   Inhale 2 puffs into the lungs every 6 hours as needed for shortness of breath / dyspnea or wheezing   Quantity:  1 Inhaler   Refills:  0       EXCEDRIN PO        Refills:  0            Where to get your medicines      These medications were sent to [facility] [address]  [address] [address]     Phone:  [phone]     escitalopram 5 MG tablet    hydrOXYzine 25 MG tablet    mirtazapine 7.5 MG Tabs tablet    risperiDONE 0.25 MG tablet                Protect others around you: Learn how to safely use, store and throw away your medicines at www.disposemymeds.org.             Medication List: This is a list of all your medications and when to take them. Check marks below indicate your daily home schedule. Keep this list as a reference.      Medications           Morning Afternoon  Evening Bedtime As Needed    albuterol 108 (90 Base) MCG/ACT Inhaler   Commonly known as:  PROAIR HFA/PROVENTIL HFA/VENTOLIN HFA   Inhale 2 puffs into the lungs every 6 hours as needed for shortness of breath / dyspnea or wheezing                                escitalopram 5 MG tablet   Commonly known as:  LEXAPRO   Take 1 tablet (5 mg) by mouth daily   Last time this was given:  5 mg on 5/7/2018  9:15 AM                                EXCEDRIN PO                                hydrOXYzine 25 MG tablet   Commonly known as:  ATARAX   Take 1 tablet (25 mg) by mouth every 4 hours as needed for anxiety   Last time this was given:  25 mg on 5/4/2018  7:39 AM                                mirtazapine 7.5 MG Tabs tablet   Commonly known as:  REMERON   Take 1 tablet (7.5 mg) by mouth At Bedtime   Last time this was given:  7.5 mg on 5/6/2018  9:56 PM                                risperiDONE 0.25 MG tablet   Commonly known as:  risperDAL   Take 1 tablet (0.25 mg) by mouth 2 times daily   Last time this was given:  0.25 mg on 5/7/2018  9:15 AM

## 2018-05-03 NOTE — IP AVS SNAPSHOT
[address] [address]    Phone:  [phone]                                       After Visit Summary   5/3/2018    Prasanth Coy    MRN: [id number]           After Visit Summary Signature Page     I have received my discharge instructions, and my questions have been answered. I have discussed any challenges I see with this plan with the nurse or doctor.    ..........................................................................................................................................  Patient/Patient Representative Signature      ..........................................................................................................................................  Patient Representative Print Name and Relationship to Patient    ..................................................               ................................................  Date                                            Time    ..........................................................................................................................................  Reviewed by Signature/Title    ...................................................              ..............................................  Date                                                            Time

## 2018-05-03 NOTE — LETTER
UR 10NB  [address] [facility] [place] [address]  [phone]    May 7, 2018    Re: Prasanth Coy  [address]    [facility] [address]  [phone] (home)     : 1975      To Whom It May Concern:      Prasanth Coy was hospitalized from 5/3/2018 until 2018 due to medical illness.  She may return to work on 18 with full duty.        Sincerely,        Swapnil Verdugo MD

## 2018-05-04 PROBLEM — R44.3 HALLUCINATION: Status: ACTIVE | Noted: 2018-05-04

## 2018-05-04 LAB
AMPHETAMINES UR QL SCN: NEGATIVE
ANION GAP SERPL CALCULATED.3IONS-SCNC: 6 MMOL/L (ref 3–14)
BARBITURATES UR QL: NEGATIVE
BASOPHILS # BLD AUTO: 0 10E9/L (ref 0–0.2)
BASOPHILS NFR BLD AUTO: 0.2 %
BENZODIAZ UR QL: NEGATIVE
BUN SERPL-MCNC: 9 MG/DL (ref 7–30)
CALCIUM SERPL-MCNC: 9.1 MG/DL (ref 8.5–10.1)
CANNABINOIDS UR QL SCN: NEGATIVE
CHLORIDE SERPL-SCNC: 104 MMOL/L (ref 94–109)
CO2 SERPL-SCNC: 28 MMOL/L (ref 20–32)
COCAINE UR QL: NEGATIVE
CREAT SERPL-MCNC: 0.79 MG/DL (ref 0.52–1.04)
DIFFERENTIAL METHOD BLD: ABNORMAL
EOSINOPHIL # BLD AUTO: 0 10E9/L (ref 0–0.7)
EOSINOPHIL NFR BLD AUTO: 0.3 %
ERYTHROCYTE [DISTWIDTH] IN BLOOD BY AUTOMATED COUNT: 13.4 % (ref 10–15)
ETHANOL UR QL SCN: NEGATIVE
GFR SERPL CREATININE-BSD FRML MDRD: 81 ML/MIN/1.7M2
GLUCOSE SERPL-MCNC: 131 MG/DL (ref 70–99)
HCG UR QL: NEGATIVE
HCT VFR BLD AUTO: 41.9 % (ref 35–47)
HGB BLD-MCNC: 13.6 G/DL (ref 11.7–15.7)
IMM GRANULOCYTES # BLD: 0 10E9/L (ref 0–0.4)
IMM GRANULOCYTES NFR BLD: 0.2 %
LYMPHOCYTES # BLD AUTO: 2.7 10E9/L (ref 0.8–5.3)
LYMPHOCYTES NFR BLD AUTO: 24 %
MCH RBC QN AUTO: 28.2 PG (ref 26.5–33)
MCHC RBC AUTO-ENTMCNC: 32.5 G/DL (ref 31.5–36.5)
MCV RBC AUTO: 87 FL (ref 78–100)
MONOCYTES # BLD AUTO: 0.6 10E9/L (ref 0–1.3)
MONOCYTES NFR BLD AUTO: 5.6 %
NEUTROPHILS # BLD AUTO: 7.8 10E9/L (ref 1.6–8.3)
NEUTROPHILS NFR BLD AUTO: 69.7 %
NRBC # BLD AUTO: 0 10*3/UL
NRBC BLD AUTO-RTO: 0 /100
OPIATES UR QL SCN: NEGATIVE
PLATELET # BLD AUTO: 327 10E9/L (ref 150–450)
POTASSIUM SERPL-SCNC: 3.7 MMOL/L (ref 3.4–5.3)
RBC # BLD AUTO: 4.83 10E12/L (ref 3.8–5.2)
SODIUM SERPL-SCNC: 138 MMOL/L (ref 133–144)
TSH SERPL DL<=0.005 MIU/L-ACNC: 1.3 MU/L (ref 0.4–4)
WBC # BLD AUTO: 11.2 10E9/L (ref 4–11)

## 2018-05-04 PROCEDURE — 99223 1ST HOSP IP/OBS HIGH 75: CPT | Mod: AI | Performed by: PSYCHIATRY & NEUROLOGY

## 2018-05-04 PROCEDURE — 80307 DRUG TEST PRSMV CHEM ANLYZR: CPT | Performed by: FAMILY MEDICINE

## 2018-05-04 PROCEDURE — 25000132 ZZH RX MED GY IP 250 OP 250 PS 637: Performed by: PSYCHIATRY & NEUROLOGY

## 2018-05-04 PROCEDURE — 81025 URINE PREGNANCY TEST: CPT | Performed by: FAMILY MEDICINE

## 2018-05-04 PROCEDURE — 12400007 ZZH R&B MH INTERMEDIATE UMMC

## 2018-05-04 PROCEDURE — 80320 DRUG SCREEN QUANTALCOHOLS: CPT | Performed by: FAMILY MEDICINE

## 2018-05-04 PROCEDURE — 84443 ASSAY THYROID STIM HORMONE: CPT | Performed by: PSYCHIATRY & NEUROLOGY

## 2018-05-04 PROCEDURE — 85025 COMPLETE CBC W/AUTO DIFF WBC: CPT | Performed by: PSYCHIATRY & NEUROLOGY

## 2018-05-04 PROCEDURE — 25000132 ZZH RX MED GY IP 250 OP 250 PS 637: Performed by: EMERGENCY MEDICINE

## 2018-05-04 PROCEDURE — 80048 BASIC METABOLIC PNL TOTAL CA: CPT | Performed by: PSYCHIATRY & NEUROLOGY

## 2018-05-04 PROCEDURE — 36415 COLL VENOUS BLD VENIPUNCTURE: CPT | Performed by: PSYCHIATRY & NEUROLOGY

## 2018-05-04 RX ORDER — ALUMINA, MAGNESIA, AND SIMETHICONE 2400; 2400; 240 MG/30ML; MG/30ML; MG/30ML
30 SUSPENSION ORAL EVERY 4 HOURS PRN
Status: DISCONTINUED | OUTPATIENT
Start: 2018-05-04 | End: 2018-05-07 | Stop reason: HOSPADM

## 2018-05-04 RX ORDER — BISACODYL 10 MG
10 SUPPOSITORY, RECTAL RECTAL DAILY PRN
Status: DISCONTINUED | OUTPATIENT
Start: 2018-05-04 | End: 2018-05-07 | Stop reason: HOSPADM

## 2018-05-04 RX ORDER — RISPERIDONE 0.25 MG/1
0.25 TABLET ORAL 2 TIMES DAILY
Status: DISCONTINUED | OUTPATIENT
Start: 2018-05-04 | End: 2018-05-07 | Stop reason: HOSPADM

## 2018-05-04 RX ORDER — ACETAMINOPHEN 325 MG/1
650 TABLET ORAL EVERY 4 HOURS PRN
Status: DISCONTINUED | OUTPATIENT
Start: 2018-05-04 | End: 2018-05-07 | Stop reason: HOSPADM

## 2018-05-04 RX ORDER — OLANZAPINE 10 MG/2ML
10 INJECTION, POWDER, FOR SOLUTION INTRAMUSCULAR
Status: DISCONTINUED | OUTPATIENT
Start: 2018-05-04 | End: 2018-05-05

## 2018-05-04 RX ORDER — HYDROXYZINE HYDROCHLORIDE 25 MG/1
25 TABLET, FILM COATED ORAL EVERY 4 HOURS PRN
Status: DISCONTINUED | OUTPATIENT
Start: 2018-05-04 | End: 2018-05-07 | Stop reason: HOSPADM

## 2018-05-04 RX ORDER — TRAZODONE HYDROCHLORIDE 50 MG/1
50 TABLET, FILM COATED ORAL
Status: DISCONTINUED | OUTPATIENT
Start: 2018-05-04 | End: 2018-05-07 | Stop reason: HOSPADM

## 2018-05-04 RX ORDER — MIRTAZAPINE 7.5 MG/1
7.5 TABLET, FILM COATED ORAL AT BEDTIME
Status: DISCONTINUED | OUTPATIENT
Start: 2018-05-04 | End: 2018-05-07 | Stop reason: HOSPADM

## 2018-05-04 RX ORDER — TRAZODONE HYDROCHLORIDE 50 MG/1
50 TABLET, FILM COATED ORAL
Status: DISCONTINUED | OUTPATIENT
Start: 2018-05-04 | End: 2018-05-04

## 2018-05-04 RX ORDER — RISPERIDONE 0.25 MG/1
0.25 TABLET ORAL DAILY
Status: DISCONTINUED | OUTPATIENT
Start: 2018-05-04 | End: 2018-05-04

## 2018-05-04 RX ORDER — HYDROXYZINE HYDROCHLORIDE 25 MG/1
25 TABLET, FILM COATED ORAL ONCE
Status: COMPLETED | OUTPATIENT
Start: 2018-05-04 | End: 2018-05-04

## 2018-05-04 RX ORDER — TRAZODONE HYDROCHLORIDE 50 MG/1
50 TABLET, FILM COATED ORAL AT BEDTIME
Status: DISCONTINUED | OUTPATIENT
Start: 2018-05-04 | End: 2018-05-04

## 2018-05-04 RX ORDER — OLANZAPINE 10 MG/1
10 TABLET ORAL
Status: DISCONTINUED | OUTPATIENT
Start: 2018-05-04 | End: 2018-05-05

## 2018-05-04 RX ORDER — ESCITALOPRAM OXALATE 5 MG/1
5 TABLET ORAL DAILY
Status: DISCONTINUED | OUTPATIENT
Start: 2018-05-04 | End: 2018-05-07 | Stop reason: HOSPADM

## 2018-05-04 RX ADMIN — RISPERIDONE 0.25 MG: 0.25 TABLET ORAL at 21:27

## 2018-05-04 RX ADMIN — OLANZAPINE 10 MG: 10 TABLET, FILM COATED ORAL at 22:33

## 2018-05-04 RX ADMIN — ESCITALOPRAM 5 MG: 5 TABLET, FILM COATED ORAL at 15:46

## 2018-05-04 RX ADMIN — MIRTAZAPINE 7.5 MG: 7.5 TABLET, FILM COATED ORAL at 21:28

## 2018-05-04 RX ADMIN — RISPERIDONE 0.25 MG: 0.25 TABLET ORAL at 15:46

## 2018-05-04 RX ADMIN — HYDROXYZINE HYDROCHLORIDE 25 MG: 25 TABLET ORAL at 07:39

## 2018-05-04 RX ADMIN — HYDROXYZINE HYDROCHLORIDE 25 MG: 25 TABLET ORAL at 02:57

## 2018-05-04 ASSESSMENT — ENCOUNTER SYMPTOMS
HEADACHES: 0
CONFUSION: 1
DYSURIA: 0
FEVER: 0
EYE DISCHARGE: 0
DIARRHEA: 0
COUGH: 0
WEAKNESS: 0
NERVOUS/ANXIOUS: 1
MYALGIAS: 0
SORE THROAT: 0
SHORTNESS OF BREATH: 0
ABDOMINAL PAIN: 0
RHINORRHEA: 0
NAUSEA: 0
FLANK PAIN: 0
HALLUCINATIONS: 1
BRUISES/BLEEDS EASILY: 0
BACK PAIN: 0
CHILLS: 0
VOMITING: 0

## 2018-05-04 ASSESSMENT — ACTIVITIES OF DAILY LIVING (ADL)
ORAL_HYGIENE: INDEPENDENT
LAUNDRY: WITH SUPERVISION
HYGIENE/GROOMING: INDEPENDENT
DRESS: INDEPENDENT

## 2018-05-04 NOTE — ED PROVIDER NOTES
"  History     Chief Complaint   Patient presents with     Anxiety     Pt states she has been anxious and depressed.  Hard to focus and has had some episodes of \"confusion\" which is new.     The history is provided by the patient (via [facility]).     Prasanth Coy is a 43 year old female who presents to the Emergency Department for anxiety accompanied with hallucinations. She is here with her bother and sister In-law. The patient reports that starting two weeks ago she began experiencing hallucinations in public areas when she was out with her boyfriend. The hallucinations are her thinking people around her are talking about her relationship and her life. 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  She reports that the hallucinations feel real. She denies history of hallucinations. She is having trouble sleeping and is afraid she will lose her daughter and her boyfriend. The patient reports a decreased appetite and trouble sleeping. The patient denies any internal voices.    The patient reports a history of mild anxiety and depression in the past because of social support related stress. She has never been hospitalized, or attempted suicide. She denies drug or alcohol use. She has had trials of medications for anxiety but there is nothing significant. The patient also denies head trauma or injury. She denies medical illness, physical injuries, history of child abuse. BEC denies that the patient is disorganized or confused. The patient has an appointment this Saturday, 5/5, with her PCP.    The patient is reported to have been raised in [place]. She moved to [place] for a masters program in psychology. After graduating she moved to [place] in 2015 with her daughter, and extended family, her brother. She currently lives independently with her 15 year old daughter. 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    I have reviewed the " Medications, Allergies, Past Medical and Surgical History, and Social History in the [employer] system.  Past Medical History:   Diagnosis Date     Uncomplicated asthma        Past Surgical History:   Procedure Laterality Date      SECTION       ORTHOPEDIC SURGERY         No family history on file.    Social History   Substance Use Topics     Smoking status: Never Smoker     Smokeless tobacco: Never Used     Alcohol use Yes      Comment: occasionally       Current Facility-Administered Medications   Medication     hydrOXYzine (ATARAX) tablet 25 mg     Current Outpatient Prescriptions   Medication     Aspirin-Acetaminophen-Caffeine (EXCEDRIN PO)     albuterol (PROAIR HFA/PROVENTIL HFA/VENTOLIN HFA) 108 (90 BASE) MCG/ACT Inhaler      No Known Allergies    Review of Systems   Constitutional: Negative for chills and fever.   HENT: Negative for congestion, rhinorrhea and sore throat.    Eyes: Negative for discharge.   Respiratory: Negative for cough and shortness of breath.    Cardiovascular: Negative for chest pain and leg swelling.   Gastrointestinal: Negative for abdominal pain, diarrhea, nausea and vomiting.   Endocrine: Negative for polyuria.   Genitourinary: Negative for dysuria and flank pain.   Musculoskeletal: Negative for back pain and myalgias.   Skin: Negative for rash.   Allergic/Immunologic: Negative for immunocompromised state.   Neurological: Negative for weakness and headaches.   Hematological: Does not bruise/bleed easily.   Psychiatric/Behavioral: Positive for confusion and hallucinations. Negative for suicidal ideas. The patient is nervous/anxious.        Physical Exam   BP: 117/70  Pulse: 80  Temp: 95.8  F (35.4  C)  Resp: 16  Weight: 75.5 kg (166 lb 8 oz)  SpO2: 98 %      Physical Exam   Constitutional: She is oriented to person, place, and time. She appears well-developed. No distress.   HENT:   Head: Normocephalic and atraumatic.   Right Ear: External ear normal.   Left Ear: External ear normal.    Mouth/Throat: Oropharynx is clear and moist.   Eyes: Conjunctivae and EOM are normal. Pupils are equal, round, and reactive to light.   Neck: Normal range of motion. Neck supple.   Cardiovascular: Normal rate, regular rhythm and normal heart sounds.    Pulmonary/Chest: Effort normal and breath sounds normal. No respiratory distress. She has no wheezes. She has no rales.   Abdominal: Soft. She exhibits no distension. There is no tenderness. There is no rebound and no guarding.   Musculoskeletal: Normal range of motion. She exhibits no tenderness or deformity.   Neurological: She is alert and oriented to person, place, and time. No cranial nerve deficit. Coordination normal.   Skin: Skin is warm and dry. No rash noted.   Psychiatric: She has a normal mood and affect. Her behavior is normal.   She is actively hallucinating (noncommanding); She is not agitated, not aggressive, not hyperactive, not combative. Thought content is paranoid and delusional. Cognition and memory are normal. She expresses no homicidal and no suicidal ideation.          ED Course     ED Course     Procedures             Critical Care time:  none             Labs Ordered and Resulted from Time of ED Arrival Up to the Time of Departure from the ED - No data to display        Assessments & Plan (with Medical Decision Making)   Prasanth Coy is a 43 year old female who presents to the Emergency Department for anxiety accompanied with hallucinations.  Patient has a history of anxiety and depression in the past which is related to social stressors.  Patient reports increased stress over the past 2 weeks along with some hallucinations in which she feels as if patient at work are talking about her and her life as well is the TV and radio is also talking about her life.  Patient has good insight into her hallucinations and is worried about how this will affect her life as she does not want to lose her family, her job, her daughter.  Patient lives at  home with her daughter, has a big family support here in [place], and has a significant other.  Patient denies any suicidal ideation, homicidal ideation, tobacco, drug, alcohol use.  Behavioral health  evaluated the patient as well and the initial thought was outpatient therapy and close follow-up.  On reevaluation family is concerned that she is not functioning well and is unable to function at work and at home due to the severity of the hallucinations that she is seeing people that are talking about her and her wife.  At this time patient planned on voluntary admission for further evaluation of the new onset of hallucinations, and possible anxiety and depression. Patient given 25mg Vistaril in the emergency department to help with sleep. Patient signed out to morning provider. Plan on admission.     I have reviewed the nursing notes.    I have reviewed the findings, diagnosis, plan and need for follow up with the patient.    New Prescriptions    No medications on file       Final diagnoses:   Anxiety   Hallucinations   Paranoid state (H)   Hallucination       5/3/2018   [facility], [place], EMERGENCY DEPARTMENT     Sarahy Wiggins MD  05/04/18 0702       Sarahy Wiggins MD  05/04/18 0702       Sarahy Wiggins MD  05/04/18 0732

## 2018-05-04 NOTE — PROGRESS NOTES
IN PATIENT ROOM: shirt, underwear, and bra    IN PATIENT LOCKER: pants with strings, cell phone, round , tennis shoes, and chelsey jacket    IN SECURITY: NOTHING- no money and no credit cards    ADMISSION:  I am responsible for any personal items that are not sent to the safe or pharmacy. [facility] is not responsible for loss, theft or damage of any property in my possession.    Patient Signature _____________________ Date/Time _____________________    Staff Signature _______________________ Date/Time _____________________  2nd Staff person, if patient is unable/unwilling to sign  ___________________________________ Date/Time _____________________  DISCHARGE:  All personal items have been returned to me.    Patient Signature _____________________ Date/Time _____________________    Staff Signature _______________________ Date/Time _____________________

## 2018-05-04 NOTE — PROGRESS NOTES
"SPIRITUAL HEALTH SERVICES  SPIRITUAL ASSESSMENT Progress Note  [facility] ([facility]) Station 10     REFERRAL SOURCE: [facility] consult order for emotional support     Visited with pt Prasanth. She was still feeling confused about her admission and at one point asked whether she was in a hospital. She endorsed some feeling sof paranoia in our visit wondering \"why does everyone look at me funny.\" She also reflected on her feelings of worry for her family and for herself. She says \"I'm supposed to take care of them and I want to be a good mom.\" We reflected together on focusing on caring for herself and self-love. Visit ended when she decided that she wanted to try to call her family in the hopes of getting in touch with her daughter.     PLAN: Will notify unit  of care provided. [facility] remains available for any further needs or requests.     MARZENA Mace.  Associate    Pager [phone]    * [facility] remains available 24/7 for emergent requests/referrals, either by having the switchboard page the on-call  or by entering an ASAP/STAT consult in Epic (this will also page the on-call ).*     "

## 2018-05-04 NOTE — PLAN OF CARE
Problem: Patient Care Overview  Goal: Team Discussion  Team Plan:   BEHAVIORAL TEAM DISCUSSION    Participants: Dr. Ian Cornejo; Savannah Rios LICSW; Melisa ABRAHAM RN; [facility] OTR/L    Progress: The patient is tearful, fearful but attempting to acclimate to the unit.  She tried to attend one OT group this morning.  She is cooperative and pleasant.     Continued Stay Criteria/Rationale: New admit    Medical/Physical: See Consult    Precautions:   Behavioral Orders   Procedures     Code 1 - Restrict to Unit     Routine Programming     As clinically indicated     Status 15     Every 15 minutes.     Plan: The patient will has a psychiatric assessment with Dr. Cornejo and medications will be discussed and started.  She was encouraged to attend all unit programming and talk to the staff if she becomes too anxious or afraid.  CTC to ensure she has a psychiatric and therapy appointment at discharge.    Rationale for change in precautions or plan: No changes

## 2018-05-04 NOTE — ED NOTES
"ED to Behavioral Floor Handoff    SITUATION  Prasanth Coy is a 43 year old female who speaks English and lives in a home with others The patient arrived in the ED by private car from home with a complaint of Anxiety (Pt states she has been anxious and depressed.  Hard to focus and has had some episodes of \"confusion\" which is new.)  .The patient's current symptoms started/worsened 2 day(s) ago and during this time the symptoms have increased.   In the ED, pt was diagnosed with   Final diagnoses:   Anxiety   Hallucinations   Paranoid state (H)   Hallucination        Initial vitals were: BP: 117/70  Pulse: 80  Temp: 95.8  F (35.4  C)  Resp: 16  Weight: 75.5 kg (166 lb 8 oz)  SpO2: 98 %   --------  Is the patient diabetic? No   If yes, last blood glucose? --     If yes, was this treated in the ED? --  --------  Is the patient inebriated (ETOH) No or Impaired on other substances? No  MSSA done? N/A  Last MSSA score: --    Were withdrawal symptoms treated? N/A  Does the patient have a seizure history? No. If yes, date of most recent seizure--    Is the patient patient experiencing suicidal ideation? denies current or recent suicidal ideation     Homicidal ideation? denies current or recent homicidal ideation or behaviors.    Self-injurious behavior/urges? denies current or recent self injurious behavior or ideation.  ------  Was pt aggressive in the ED No  Was a code called No  Is the pt now cooperative? Yes  -------  Meds given in ED:   Medications   hydrOXYzine (ATARAX) tablet 25 mg (not administered)      Family present during ED course? Yes  Family currently present? Yes    BACKGROUND  Does the patient have a cognitive impairment or developmental disability? No  Allergies: No Known Allergies.   Social demographics are   Social History     Social History     Marital status: Single     Spouse name: N/A     Number of children: N/A     Years of education: N/A     Social History Main Topics     Smoking status: Never " Smoker     Smokeless tobacco: Never Used     Alcohol use Yes      Comment: occasionally     Drug use: No     Sexual activity: Not Asked     Other Topics Concern     None     Social History Narrative        ASSESSMENT  Labs results Labs Ordered and Resulted from Time of ED Arrival Up to the Time of Departure from the ED - No data to display   Imaging Studies: No results found for this or any previous visit (from the past 24 hour(s)).   Most recent vital signs /70  Pulse 80  Temp 95.8  F (35.4  C) (Oral)  Resp 16  Wt 75.5 kg (166 lb 8 oz)  LMP 05/02/2018  SpO2 98%  Breastfeeding? No   Abnormal labs/tests/findings requiring intervention:---   Pain control: pt had none  Nausea control: pt had none    RECOMMENDATION  Are any infection precautions needed (MRSA, VRE, etc.)? No If yes, what infection? --  ---  Does the patient have mobility issues? independently. If yes, what device does the pt use? ---  ---  Is patient on 72 hour hold or commitment? No If on 72 hour hold, have hold and rights been given to patient? N/A  Are admitting orders written if after 10 p.m. ?Yes  Tasks needing to be completed:---     Jose C Lau   ascom-- [phone] [facility]   [phone] [facility] ED

## 2018-05-04 NOTE — PROGRESS NOTES
Pt attended last 10 minutes of a structured occupational therapy group focusing on a social/leisure task (no charge), and was observed to become tearful while answering question regarding her family. Encourage attendance and participation. Pt will be given self-assessment form, and OT staff will explain the purpose of including them in their treatment plan and offer options for meeting their needs.

## 2018-05-04 NOTE — PLAN OF CARE
Problem: Mood Impairment (Depressive Signs/Symptoms) (Adult)  Goal: Improved Mood Symptoms (Depressive Signs/Symptoms)  Outcome: No Change  Pt was admitted to station 10N 5/4/18    Pt is here voluntarily from [place] ED    Pt was cooperative with admission search. Pt was tearful when doing her admission profile. Pt states she is depressed. Pt states hallucinations started about 2 weeks ago. Voices are both male and female and positive and negative. Denies that they command her to do anything.  Pt states the voices are talking about things that have happened her life.    Pt states she does not sleep well. Pt was very tearful during the admission profile.  Pt states she has a lot of positive things going for her but she is still very sad.  States her daughter is doing well, she a great boyfriend named mynor and she recently received an excellent review at work.  Pt states she has a poor appetite.  Pt states she does not sleep well. Pt does not take any medications except for a PRN inhaler for asthma. Pt denies alcohol or tobacco use.  This is her first mental health admission. Pt requested a domingo visit which was ordered for her.  Pt speaks english and Kazakh and states she does need an interpretor.   Pt states she has never been suicidal.  Pt lives with her 15 yr old daughter. Daughter is with pts brother and sister in law.   Pt contracts for safety. Pt was given a brief orientation to unit. Pt appears to be no harm to self or others.

## 2018-05-04 NOTE — PROGRESS NOTES
"Initial Psychosocial Assessment    I have reviewed the chart, met with the patient, and developed Care Plan.      Presenting Problem:  The patient is a 43 year-old female admitted with depression, anxiety, paranoia and delusions.  Brought to the ED twice - once yesterday and she didn't want admit and then again today.  She is employed as a therapist at [employer] and has been thinking that people around her are talking about her relationship and her life.  This was even happening when she was with her clients.  Poor sleep.  Afraid that she will lose her daughter and her boyfriend, Newton, whom she has been dating for a year. Has never been hospitalized I the past for mental health issues. She does feel disorganized.  The patient is voluntarily seeking help.    Patient's sister who lives in [place] called and provided information.  She reports that patient's parents live in [place] and are flying up to [place] to be of support to her.  Sister also planning to fly up.  She said the Major Depression runs in family.  Brother, sister and daughter have all had serious bouts of depression.  Patient has had depression off and on but it has never \"seriously\" been treated by a psychiatrist.  Patient told her sister that she feels BF Newton's wife is spying on her and getting the names of her [employer] clients.  She also feels she is getting messages specifically for her from the radio.  Sister glad she was admitted and family will provide good support.    Patient is tearful and ruminating about letting her family down and feeling they are \"suffering\" because she is here.  She is anxious, afraid and wanted to know how long she would be here. This writer explained we would stabilize her mood and symptoms and when the psychiatrist and she feel she is ready we would discharge her home.  She did attend part of an OT group.  Understands her parents are coming up to visit her.  Concerned that if they came, she could only see them " "at \"a certain time\" but this writer explained that since they are flying from [place] we can make exceptions for visiting if it is cleared with the charge nurse.  Patient accepted this information.    History of Mental Health and Chemical Dependency:  No CD but said that she has struggled with depression since 2011 when a relationship in [place] ended. She did seek antidepressants but never consistently and never got consistent help for her symptoms.    Family Description (Constellation, Family Psychiatric History):  Patient born and raised in [place].  She moved to [place] and obtained a Master's Degree in Psychology and moved to [place] in 2015 with her daughter (now 15), some extended family and her brother.  She lives independently with her daughter.    Significant Life Events (Illness, Abuse, Trauma, Death):  None    Living Situation:  Patient lives independently in an apartment with her daughter.      Educational Background:  Master's in Psychology    Occupational History:  Works for [employer] as an [employer] Worker so travels to see her clients in the homes often working between 50-55 hours per week.    Financial Status:  Wages    Legal Issues:  None    Ethnic/Cultural Issues:  None    Spiritual Orientation:  Restorationist     Service History:  None    Social Functioning (organization, interests):  Socializing with family    Current Treatment Providers are:  PCP at [facility]    Social [facility] Assessment/Plan:  Patient needs psychiatric assessment and medication management.  Stabilization of her mood and psychotic symptoms.  She has been encouraged to talk to staff with any questions or concerns.  She has been encouraged to attend all unit programming.  CTC to ensure she has a comprehensive care plan in place at discharge.  "

## 2018-05-05 PROCEDURE — 25000132 ZZH RX MED GY IP 250 OP 250 PS 637: Performed by: PSYCHIATRY & NEUROLOGY

## 2018-05-05 PROCEDURE — 12400007 ZZH R&B MH INTERMEDIATE UMMC

## 2018-05-05 RX ORDER — OLANZAPINE 10 MG/2ML
5 INJECTION, POWDER, FOR SOLUTION INTRAMUSCULAR
Status: DISCONTINUED | OUTPATIENT
Start: 2018-05-05 | End: 2018-05-07 | Stop reason: HOSPADM

## 2018-05-05 RX ORDER — OLANZAPINE 5 MG/1
5 TABLET ORAL
Status: DISCONTINUED | OUTPATIENT
Start: 2018-05-05 | End: 2018-05-07 | Stop reason: HOSPADM

## 2018-05-05 RX ADMIN — MIRTAZAPINE 7.5 MG: 7.5 TABLET, FILM COATED ORAL at 20:30

## 2018-05-05 RX ADMIN — ESCITALOPRAM 5 MG: 5 TABLET, FILM COATED ORAL at 09:05

## 2018-05-05 RX ADMIN — RISPERIDONE 0.25 MG: 0.25 TABLET ORAL at 20:30

## 2018-05-05 RX ADMIN — RISPERIDONE 0.25 MG: 0.25 TABLET ORAL at 09:05

## 2018-05-05 ASSESSMENT — ACTIVITIES OF DAILY LIVING (ADL)
LAUNDRY: WITH SUPERVISION
ORAL_HYGIENE: INDEPENDENT
HYGIENE/GROOMING: INDEPENDENT
DRESS: INDEPENDENT

## 2018-05-05 NOTE — PROGRESS NOTES
"Pt has not gotten out of bed today as of 1330, except for getting her meds at the med window. On 90% of the rounds she appeared to be sleeping.  Pt did not eat breakfast but ate 1/2 of a burger and a half a bag of chips at 1230 in her room. Pt always looked confused when this writer approached her for anything. Pt denies SI and SIB thoughts. Pt looked perplexed when asked whether she was depressed or anxious but did say \"yes\" when asked if she were sad.   "

## 2018-05-05 NOTE — H&P
"[facility], [place]   Psychiatric History & Physical  Admission date: 5/3/2018        Chief Complaint:   \"I don't want to loose my family because I'm here, they mean everything to me.\"        HPI:     Prasanth is a 43 year old  female with no prior formal psychiatric diagnosis, is admitted on a voluntary basis for worsening depressive moods, anxiety, auditory hallucinations, paranoia and confusion. The patient is pleasant upon approach and cooperative to meet with me. She reports that she has \"everything,\" and describes having a supportive/loving BF, a 14 year old daughter, a good job (she works as an [employer] worker at [employer]). She does not account any recent heightened stress in her life, although does feel that work can become very busy given her hours and work load. She does speak, in tearful expression, about her stressful past relationship, which broke apart in 2011. Regarding her psychiatric symptoms, which she mentions started a couple weeks ago: she mentions that she has intrusive voices that are derogatory, feeling that people are talking about her/agsint her, feeling perseverative self critical thinking, poor sleep, poor concentration, and poor appetite. She harbors some delusional-thoughts, about people at work talking/plotting against her, and her BF's ex trying to track her, monitor her. Reports also state she feels that she get some messages from the TV. Denies any past or current suicidal ideation, intent or plan. She denies any habitual alcohol or drug use. She is afraid she will loose respect and support from her family if they knew what she was going through. She is open to try medications, along with recommendations for therapy, etc.         Past Psychiatric History:   Past inpatient treatment: None   Current outpatient psychiatrist: None. Has PCP at [facility].   Current outpatient therapist: None   Other (ACT team etc): None   Past suicide " attempts: None  History of commitments: None  History of ECT: None         Substance Use and History:   Denies substance use, including alcohol or illicit drug use. Utox was negative.         Past Medical History:   PAST MEDICAL HISTORY:   Past Medical History:   Diagnosis Date     Uncomplicated asthma        PAST SURGICAL HISTORY:   Past Surgical History:   Procedure Laterality Date      SECTION       ORTHOPEDIC SURGERY               Family History:   FAMILY HISTORY:   Family History   Problem Relation Age of Onset     Depression Mother      Depression Sister            Social History:   SOCIAL HISTORY:   Social History   Substance Use Topics     Smoking status: Never Smoker     Smokeless tobacco: Never Used     Alcohol use Yes      Comment: occasionally            Physical ROS:   The patient endorsed some fatigue. The remainder of 10-point review of systems was negative except as noted in HPI.         PTA Medications:     Prescriptions Prior to Admission   Medication Sig Dispense Refill Last Dose     Aspirin-Acetaminophen-Caffeine (EXCEDRIN PO)    Past Month at Unknown time     albuterol (PROAIR HFA/PROVENTIL HFA/VENTOLIN HFA) 108 (90 BASE) MCG/ACT Inhaler Inhale 2 puffs into the lungs every 6 hours as needed for shortness of breath / dyspnea or wheezing 1 Inhaler 0 Unknown at Unknown time          Allergies:   No Known Allergies       Labs:     Recent Results (from the past 48 hour(s))   CBC with platelets differential    Collection Time: 18  1:35 PM   Result Value Ref Range    WBC 11.2 (H) 4.0 - 11.0 10e9/L    RBC Count 4.83 3.8 - 5.2 10e12/L    Hemoglobin 13.6 11.7 - 15.7 g/dL    Hematocrit 41.9 35.0 - 47.0 %    MCV 87 78 - 100 fl    MCH 28.2 26.5 - 33.0 pg    MCHC 32.5 31.5 - 36.5 g/dL    RDW 13.4 10.0 - 15.0 %    Platelet Count 327 150 - 450 10e9/L    Diff Method Automated Method     % Neutrophils 69.7 %    % Lymphocytes 24.0 %    % Monocytes 5.6 %    % Eosinophils 0.3 %    % Basophils 0.2 %     "% Immature Granulocytes 0.2 %    Nucleated RBCs 0 0 /100    Absolute Neutrophil 7.8 1.6 - 8.3 10e9/L    Absolute Lymphocytes 2.7 0.8 - 5.3 10e9/L    Absolute Monocytes 0.6 0.0 - 1.3 10e9/L    Absolute Eosinophils 0.0 0.0 - 0.7 10e9/L    Absolute Basophils 0.0 0.0 - 0.2 10e9/L    Abs Immature Granulocytes 0.0 0 - 0.4 10e9/L    Absolute Nucleated RBC 0.0    Basic metabolic panel    Collection Time: 05/04/18  1:35 PM   Result Value Ref Range    Sodium 138 133 - 144 mmol/L    Potassium 3.7 3.4 - 5.3 mmol/L    Chloride 104 94 - 109 mmol/L    Carbon Dioxide 28 20 - 32 mmol/L    Anion Gap 6 3 - 14 mmol/L    Glucose 131 (H) 70 - 99 mg/dL    Urea Nitrogen 9 7 - 30 mg/dL    Creatinine 0.79 0.52 - 1.04 mg/dL    GFR Estimate 81 >60 mL/min/1.7m2    GFR Estimate If Black >90 >60 mL/min/1.7m2    Calcium 9.1 8.5 - 10.1 mg/dL   TSH with free T4 reflex    Collection Time: 05/04/18  1:35 PM   Result Value Ref Range    TSH 1.30 0.40 - 4.00 mU/L   Drug abuse screen 6 urine (tox)    Collection Time: 05/04/18  1:39 PM   Result Value Ref Range    Amphetamine Qual Urine Negative NEG^Negative    Barbiturates Qual Urine Negative NEG^Negative    Benzodiazepine Qual Urine Negative NEG^Negative    Cannabinoids Qual Urine Negative NEG^Negative    Cocaine Qual Urine Negative NEG^Negative    Ethanol Qual Urine Negative NEG^Negative    Opiates Qualitative Urine Negative NEG^Negative   HCG qualitative urine    Collection Time: 05/04/18  1:39 PM   Result Value Ref Range    HCG Qual Urine Negative NEG^Negative          Physical and Psychiatric Examination:     /76  Pulse 76  Temp 96.4  F (35.8  C) (Oral)  Resp 16  Ht 1.676 m (5' 6\")  Wt 74.3 kg (163 lb 12.8 oz)  LMP 05/02/2018  SpO2 98%  Breastfeeding? No  BMI 26.44 kg/m2  Weight is 163 lbs 12.8 oz  Body mass index is 26.44 kg/(m^2).    Physical Exam:  I have reviewed the physical exam as documented by ED provider and agree with findings and assessment and have no additional findings to " "add at this time.    Mental Status Exam:  Appearance:  female, slightly unkept appearing. In hospital scrubs. Mild emotional upset.   Attitude:  Calm and cooperataive throughout, and frequently tearful  Eye Contact:  Adequate   Mood:  \"I'm sad about what I'm going through\"   Affect:  Tearful   Speech: Regular in rate and rhythm   Language: fluent and intact in English  Psychomotor, Gait, Musculoskeletal:  Unremarkable   Throught Process:  Linear, logical   Associations:  no loose associations  Thought Content:  Centered on her thoughts, emotions, and how it will impact her relationship with her family. Denies current SI/HI/AH/VH. Turner snot appear to be responding to internal stimuli at this time.   Insight:  Mild/Fair   Judgement:  Fair   Oriented to:  Person, place, time   Attention Span and Concentration:  Adequate   Recent and Remote Memory:  Fair   Fund of Knowledge:  Average          Admission Diagnoses:     Major Depressive Disorder, recurrent, moderate, with mood congruent psychotic symptoms  Generalized Anxiety Disorder   R/o Delusional Disorder             Assessment & Plan:     Assessment:  Prasanth appears to be experiencing the somatic, cognitive, affective, and even abnormal perceptual manifestations of depressive/anxiety disorder. The perceptual abnormalities include, paranoid ideation, ideas of reference, and delusional thoughts. The underlying neuropathology could be possibly related to a dysregulated HPA axis, which can lead to all the symptoms stated above. She has strong protective factors, which include employment, supportive family, and desire to take medications and accept therapy. We discussed the benefit of bio/psycho/social intervention, and took time to discuss the benefit of medications in attenuating her heightened thoughts, depressed and anxious mood, along with helping achieve better sleep and appetite. She understood risks and benefits and agreed to recommendations.     Plan:  1.) " Medication Plan:  - Risperdal 0.25 mg BID - for rapid calming of her rapid, anxious thinking, and as an adjunct to an SSRI.   - Lexapro 5 mg - for anxiety, depressive moods  - Remeron 7.5 mg HS - for sleep and appetite problems.     2.) Psychosocial Plan:  - Referrals/resources to psychotherapy and outpatient psychiatry.     Legal:  Voluntary     Disposition:  Discharge likely early next week     Ian Cornejo MD  [facility]    Spent  60  minutes on encounter, >50% of which was spent in counseling and/or coordination of care, consisting of taking history, reviewing system, and discussing medication and side effects

## 2018-05-05 NOTE — PROGRESS NOTES
Pt has been placing her dirty sanitary pads on the floor in her room. Writer attempted to explain why this is unacceptable but pt did not appear to fully understand. Writer moved her roommate out and will block her room until pt becomes more organized and less confused.

## 2018-05-05 NOTE — PROGRESS NOTES
"Prasanth was back and forth between her room and the milieu this evening.  Withdrawn, minimally social with peers, and flat affect.  Pt attended community meeting, and participated appropriately.  Overall had an OK evening, but around 2130 pt appeared quite fearful, scared, and tense.  During check in, pt endorsed auditory and visual hallucinations, stating these are what were scaring her the most.  Pt had a difficult time describing hallucinations, but did state \"there are lots of people, they're all talking at me and telling me what to do\".  She also expressed much sadness and fear in relation to \"losing my family, my friends, my job\".  She is fearful that she will be a burden, and won't understand her illness.  Pt was tearful throughout conversation, observed having some thought blocking in what was likely related to anxious and fearful state.  Pt is pleasant and cooperative, denied SI/SIB, endorsed poor appetite and sleep.     05/04/18 2208   Behavioral Health   Hallucinations auditory;visual;appears responding   Thinking distractable;poor concentration   Orientation situation, disoriented;person: oriented;place: oriented   Insight admits / accepts;insight appropriate to situation   Judgement impaired   Eye Contact at floor;at examiner   Affect blunted, flat;sad;tense   Mood depressed;shame/guilt;anxious   Physical Appearance/Attire attire appropriate to age and situation   Hygiene well groomed   Suicidality other (see comments)  (pt denied)   1. Wish to be Dead No   2. Non-Specific Active Suicidal Thoughts  No   Self Injury other (see comment)  (pt denied)   Elopement (nothing stated or observed)   Activity withdrawn   Speech pressured;other (see comments)  (thought blocking)   Medication Sensitivity no observed side effects   Psychomotor / Gait balanced;steady   Coping/Psychosocial   Verbalized Emotional State anxiety;fear;guilt;hopelessness;sadness   Psycho Education   Type of Intervention 1:1 intervention "   Response participates, initiates socially appropriate   Hours 0.5   Treatment Detail mh check in   Group Therapy Session   Group Attendance attended group session   Group Type community   Activities of Daily Living   Hygiene/Grooming independent   Oral Hygiene independent   Dress independent   Laundry with supervision   Room Organization independent

## 2018-05-06 PROBLEM — F32.3 MAJOR DEPRESSIVE DISORDER WITH PSYCHOTIC FEATURES (H): Status: ACTIVE | Noted: 2018-05-06

## 2018-05-06 PROCEDURE — 25000132 ZZH RX MED GY IP 250 OP 250 PS 637: Performed by: PSYCHIATRY & NEUROLOGY

## 2018-05-06 PROCEDURE — 90853 GROUP PSYCHOTHERAPY: CPT

## 2018-05-06 PROCEDURE — 12400007 ZZH R&B MH INTERMEDIATE UMMC

## 2018-05-06 RX ORDER — RISPERIDONE 0.25 MG/1
0.25 TABLET ORAL 2 TIMES DAILY
Qty: 60 TABLET | Refills: 1 | Status: SHIPPED | OUTPATIENT
Start: 2018-05-07 | End: 2019-03-11

## 2018-05-06 RX ORDER — ESCITALOPRAM OXALATE 5 MG/1
5 TABLET ORAL DAILY
Qty: 30 TABLET | Refills: 1 | Status: SHIPPED | OUTPATIENT
Start: 2018-05-07 | End: 2019-03-11

## 2018-05-06 RX ORDER — MIRTAZAPINE 7.5 MG/1
7.5 TABLET, FILM COATED ORAL AT BEDTIME
Qty: 30 TABLET | Refills: 1 | Status: SHIPPED | OUTPATIENT
Start: 2018-05-07 | End: 2019-03-11

## 2018-05-06 RX ORDER — HYDROXYZINE HYDROCHLORIDE 25 MG/1
25 TABLET, FILM COATED ORAL EVERY 4 HOURS PRN
Qty: 30 TABLET | Refills: 1 | Status: SHIPPED | OUTPATIENT
Start: 2018-05-06 | End: 2019-03-11

## 2018-05-06 RX ADMIN — ESCITALOPRAM 5 MG: 5 TABLET, FILM COATED ORAL at 09:03

## 2018-05-06 RX ADMIN — MIRTAZAPINE 7.5 MG: 7.5 TABLET, FILM COATED ORAL at 21:56

## 2018-05-06 RX ADMIN — RISPERIDONE 0.25 MG: 0.25 TABLET ORAL at 21:56

## 2018-05-06 RX ADMIN — RISPERIDONE 0.25 MG: 0.25 TABLET ORAL at 09:03

## 2018-05-06 ASSESSMENT — ACTIVITIES OF DAILY LIVING (ADL)
GROOMING: PROMPTS;INDEPENDENT
LAUNDRY: UNABLE TO COMPLETE
DRESS: INDEPENDENT;PROMPTS
ORAL_HYGIENE: PROMPTS;INDEPENDENT

## 2018-05-06 NOTE — PLAN OF CARE
"Problem: Mood Impairment (Depressive Signs/Symptoms) (Adult)  Goal: Improved Mood Symptoms (Depressive Signs/Symptoms)  Outcome: No Change  Pt had several goals for today, she wants to talk to her daughter and shower. Pt has not been able to meet these goals yet.  Pts family visited. Pt went to group. Pt did not eat breakfast but did eat lunch. Pt states her depression and anxiety are \"better than 2 days ago\" Also states her hallucinations are better than 2 days ago.  Pt states she is confused. Pt does know she is in the hospital and wants to stay and receive help.  Pt denies SI and SIB. Pt sleep through breakfast but did get up to take morning medications.       "

## 2018-05-06 NOTE — PLAN OF CARE
Problem: Social/Occupational/Functional Impairment (Depressive Signs/Symptoms) (Adult)  Goal: Improved Social/Occupational/Functional Skills (Depressive Signs/Symptoms)    Prasanth participated in OT group designed to promote positive social interaction and improve occupational engagement through structured group activity. She demonstrated slightly labile affect, looking anxious, smiling, and becoming tearful when discussing her family. As she became tearful, she apologized to group stating  I know my emotions have been so up and down, and I haven t done a good job sharing these past few days.  She independently identified that she was focusing on negative things but now is able to recognize the opportunity to make a  positive transformation . Group was supportive and validated her contributions. She demonstrated focus on the game and initiated other contributions to share comments relevant to activity discussion.

## 2018-05-06 NOTE — PROGRESS NOTES
Pt remained in her room for the entire evening shift, resting/sleeping.  Family came for visiting hours and pt states it went well and made her very happy.  Still endorses visual and auditory hallucinations, but that they are improved since last night.  Denies SI/SIB, but says still feeling sad and depressed, however this is also improved.  Pt only ate roughly 20% of dinner and did not have any snacks.  Feeling much more hopeful, looking to get more rest and continue to improve and see her family again tomorrow.     05/05/18 2100   Behavioral Health   Hallucinations auditory;visual   Thinking delusional;poor concentration   Orientation person: oriented;place: oriented   Insight admits / accepts;insight appropriate to situation   Judgement impaired   Eye Contact out of corner of eyes;at examiner   Affect blunted, flat   Mood depressed;mood is calm   Physical Appearance/Attire attire appropriate to age and situation   Hygiene neglected grooming - unclean body, hair, teeth   Suicidality other (see comments)  (pt denied)   1. Wish to be Dead No   2. Non-Specific Active Suicidal Thoughts  No   Self Injury other (see comment)  (pt denied)   Elopement (nothing stated or obseved)   Activity isolative   Speech clear   Medication Sensitivity no stated side effects;no observed side effects   Psychomotor / Gait balanced;steady   Coping/Psychosocial   Verbalized Emotional State depression;sadness   Psycho Education   Type of Intervention 1:1 intervention   Response participates with encouragement   Hours 0.5   Treatment Detail mh check in   Group Therapy Session   Group Attendance refused to attend group session   Group Type community   Activities of Daily Living   Hygiene/Grooming independent   Oral Hygiene independent   Dress independent   Laundry with supervision   Room Organization independent

## 2018-05-07 VITALS
SYSTOLIC BLOOD PRESSURE: 124 MMHG | WEIGHT: 163.8 LBS | BODY MASS INDEX: 26.33 KG/M2 | DIASTOLIC BLOOD PRESSURE: 81 MMHG | HEART RATE: 88 BPM | RESPIRATION RATE: 16 BRPM | OXYGEN SATURATION: 98 % | HEIGHT: 66 IN | TEMPERATURE: 97.5 F

## 2018-05-07 PROCEDURE — 99239 HOSP IP/OBS DSCHRG MGMT >30: CPT | Performed by: PSYCHIATRY & NEUROLOGY

## 2018-05-07 PROCEDURE — 97150 GROUP THERAPEUTIC PROCEDURES: CPT | Mod: GO

## 2018-05-07 PROCEDURE — 90853 GROUP PSYCHOTHERAPY: CPT

## 2018-05-07 PROCEDURE — 25000132 ZZH RX MED GY IP 250 OP 250 PS 637: Performed by: PSYCHIATRY & NEUROLOGY

## 2018-05-07 RX ADMIN — ESCITALOPRAM 5 MG: 5 TABLET, FILM COATED ORAL at 09:15

## 2018-05-07 RX ADMIN — RISPERIDONE 0.25 MG: 0.25 TABLET ORAL at 09:15

## 2018-05-07 ASSESSMENT — ACTIVITIES OF DAILY LIVING (ADL)
ORAL_HYGIENE: INDEPENDENT
HYGIENE/GROOMING: INDEPENDENT
LAUNDRY: UNABLE TO COMPLETE
DRESS: INDEPENDENT

## 2018-05-07 NOTE — DISCHARGE INSTRUCTIONS
Behavioral Discharge Planning and Instructions      Summary:  You were admitted on 5/3/2018  due to auditory hallucinations, delusions, depression.  You were treated by Dr Ian Cornejo MD and discharged on ***/***/**  from [address] to your home.      Principal Diagnosis:       Health Care Follow-up Appointments:   Dr. Lauren Abraham on Monday, May 21st at 7:30am (paperwork) and then meet with the provider for initial assessment from 8-9am.  [facility]  [address]  [place], [place]  [phone]  FAX - [phone]                      Attend all scheduled appointments with your outpatient providers. Call at least 24 hours in advance if you need to reschedule an appointment to ensure continued access to your outpatient providers.   Major Treatments, Procedures and Findings:  You were provided with: a psychiatric assessment, assessed for medical stability, medication evaluation and/or management, group therapy and milieu management    Symptoms to Report: increased confusion, losing more sleep, mood getting worse or thoughts of suicide    Early warning signs can include: increased depression or anxiety sleep disturbances increased thoughts or behaviors of suicide or self-harm  increased unusual thinking, such as paranoia or hearing voices    Safety and Wellness:  Take all medicines as directed.  Make no changes unless your doctor suggests them.      Follow treatment recommendations.  Refrain from alcohol and non-prescribed drugs.  If there is a concern for safety, call 911.    Resources:   Crisis Intervention: [phone] or [phone] (TTY: [phone]).  Call anytime for help.    The treatment team has appreciated the opportunity to work with you.     If you have any questions or concerns our unit number is [phone].

## 2018-05-07 NOTE — PLAN OF CARE
"Problem: Cognitive Impairment (Psychotic Signs/Symptoms) (Adult)  Goal: Improved Thought Clarity/Organization (Psychotic Signs/Symptoms)  Outcome: Therapy, progress towards functional goals is fair    RN ASSESSMENT    Pt appears less frightened and paranoid this evening. More visible in milieu and appears more comfortable. Reportedly attended some groups earlier today. States it was very helpful to her to have sister & daughter visit this afternoon and parents & brother visit this evening. States They are all supportive and want her to stay in the hospital until she has improved. States also that her boyfriend is supportive. Reports decrease in frequency and intensity of AH today. Reports voices do not speak to her directly but rather are \"in my surroundings\" referencing her problems and state of wellbeing. Reports visual hallucinations continue but are also less frequent. Stated when writer asked her, \"Yes, I see a person now.\" She pointed up and behind writer as if someone was standing behind writer. Denied being afraid of this person, stating \"No, it's someone I recognize, and they have a nice expression.\" Pt reports being tired much of the day. She reports she did not sleep for 2-3 nights before admission, but that new medications could be a factor as well. Continues to perseverate on the wellbeing of other people in her life rather than her own, stating multiple times, \"I'm worried about my family. I'm worried about my clients. I want to be a good mother. I'm done focusing on myself now and need to start focusing on other people in my life now.\" Reassured that it is OK to be sick, to take time for herself to recuperate, to take sick time/vacation time away from work and that none of this is a selfish act. She appeared reassured, asking \"Really? OK,\" then became tearful. Reports mood has improved, feels less depressed and sad than upon admission. Reports anxiety remains high. States it was \"10 and a half\" out of " 10 in severity on admission but is around 7-8 much of the day now. Denies SI/SIB. Pleasant, cooperative, med-compliant. Continue with current treatment plan and recommendations. Continue to monitor and reassess symptoms. Monitor response to medications. Monitor progress towards treatment goals. Encourage groups and participation.

## 2018-05-07 NOTE — PROGRESS NOTES
05/06/18 2231   Behavioral Health   Thoughts/Cognition (WDL) ex   Hallucinations auditory;visual   Thinking distractable;poor concentration;other (see comment)  (perseverative )   Insight poor;admits / accepts   Judgement impaired   Affect/Mood (WDL) Ex   Affect tense;sad  (improved)   Mood anxious   ADL Assessment (WDL) ex   Physical Appearance/Attire disheveled   Hygiene neglected grooming - unclean body, hair, teeth   Suicidality (WDL) WDL   1. Wish to be Dead No   2. Non-Specific Active Suicidal Thoughts  No   3. Active Sucidal Ideation with any Methods (Not Plan) Without Intent to Act  No   4. Active Suicidal Ideation with Some Intent to Act, Without Specific Plan  No   5. Active Suicidal Ideation with Specific Plan and Intent  No   Elopement (WDL) WDL   Activity (WDL) Ex   Activity withdrawn  (on periphery but more visible tonight)   Speech (WDL) WDL   Medication Sensitivity (WDL) Ex   Medication Sensitivity sedation   Psychomotor Gait (WDL) WDL   Overt Agression (WDL) WDL   Fall Assessment   Get up and Go Test 0 - pushes up, successful in 1 attempt   Coping/Psychosocial   Verbalized Emotional State anxiety   Plan Of Care Reviewed With patient;father;family   Patient Agreement with Plan of Care agrees   Daily Care   Activity up ad jj   Activities of Daily Living   Hygiene/Grooming prompts;independent   Oral Hygiene prompts;independent   Dress independent;prompts   Laundry unable to complete   Room Organization independent   Activity   Activity Assistance Provided independent

## 2018-05-07 NOTE — PROGRESS NOTES
Patient works for [employer] and needed another clinic in her area where she could receive ongoing medication management with a psychiatric provider since she had only seen PCP prior to hospitalization.  Patient now has new medication management appt set up with Dr. Lauren Luna at [facility] in [place] on Monday, May 21st at 7:30am.  Once she shows up for the appointment, they will get her set up with a therapist there as well. Patient has been visible in milieu today and attending all programming.  Family visits have gone well and she feels like she has additional support.

## 2018-05-07 NOTE — PROGRESS NOTES
05/07/18 1527   Behavioral Health   Hallucinations auditory;visual   Thinking distractable;poor concentration   Insight poor;admits / accepts   Judgement impaired   Affect sad;tense   Mood anxious   Physical Appearance/Attire attire appropriate to age and situation   Hygiene neglected grooming - unclean body, hair, teeth   1. Wish to be Dead No   2. Non-Specific Active Suicidal Thoughts  No   Speech clear;coherent   Activities of Daily Living   Hygiene/Grooming independent   Oral Hygiene independent   Dress independent   Laundry unable to complete   Room Organization independent     PT. Appeared to be calm. PT. Was concern with going home. Pt. Appeared to be worry about how long she will be staying at the hospital and how could she leave. Pt. Spent most of the time in her room, pt. Attended some groups. No SI and SIB noticed.

## 2018-05-07 NOTE — PLAN OF CARE
Problem: Mood Impairment (Depressive Signs/Symptoms) (Adult)  Goal: Improved Mood Symptoms (Depressive Signs/Symptoms)  Outcome: Adequate for Discharge Date Met: 05/07/18  Pt was escorted off the unit by staff to her parents car.  Pt verbalized readiness for discharge. Pt verbalized understanding of her discharge plan including her discharge medications and follow up appointments.  Pt continues to have auditory and visual hallucinations but states they are much less than when she came in.  Pt was discharged with her personal belongings and her prescribed medications. Pt denies SI and SIB.  Pt was calm and excited about discharging. Pt hopes her parents will stay in the states for a little bit, parents flew in from [place] since she was admitted to the hospital to be with her. Pt has a large support system.  Pt sister called (LILIA was signed) and writer gave her information on discharge plan and sister sounded very supportive and caring. Writer explained to pt and sister what to do if she is not feelin well mentally and both stated they understood.

## 2018-05-07 NOTE — DISCHARGE SUMMARY
[facility], [place]   Psychiatric Discharge Summary  Time: 38 minutes on encounter.    Prasanth Coy MRN# [id number]   Age: 43 year old YOB: 1975     Date of Admission:  5/3/2018  Date of Discharge:  05/07/18  Admitting Physician:  Swapnil Snow  Discharge Physician:  Swapnil Snow         Event Leading to Hospitalization and Hospital Course:   Prasanth Coy was admitted for worsening depressive symptoms with auditory hallucinations and paranoia.       See Admission note by Clemente on 5/4 for additional details.     Prasanth Coy was hospitalized voluntarily for the major depressive episode that subsequently led to psychotic symptoms confusion and paranoia.  She was started on escitalopram, mirtazapine, risperidone and had relatively quick resolution of symptoms within a few days.  On day of discharge she appears and presents as less confused is able to ask appropriate questions and feels as though she is much better.  She is no longer having thoughts of ending her life and does not feel hopeless and helpless and her anxiety has improved.  She does not have as much paranoia and is less concerned about her safety.  She does have some slight auditory hallucinations however they are subsiding.  She had a very good visit with her family members and is sleeping well and would like to go back home.  The only side effect related to the medication she reports a sedation and believes that will get better and is planning on following up in the outpatient setting.  We discussed safety planning in detail as well as coming back to the hospital if things are not going the proper direction in terms of her recovery.           DIagnoses:   Major depressive disorder severe with psychotic symptoms  Generalized anxiety disorder          Labs:   No results found for this or any previous visit (from the past 24 hour(s)).         Consults:   No consultations were requested during this  admission           Discharge Medications:        Review of your medicines      START taking       Dose / Directions    escitalopram 5 MG tablet   Commonly known as:  LEXAPRO   Used for:  Anxiety        Dose:  5 mg   Take 1 tablet (5 mg) by mouth daily   Quantity:  30 tablet   Refills:  1       hydrOXYzine 25 MG tablet   Commonly known as:  ATARAX   Used for:  Anxiety        Dose:  25 mg   Take 1 tablet (25 mg) by mouth every 4 hours as needed for anxiety   Quantity:  30 tablet   Refills:  1       mirtazapine 7.5 MG Tabs tablet   Commonly known as:  REMERON   Used for:  Anxiety        Dose:  7.5 mg   Take 1 tablet (7.5 mg) by mouth At Bedtime   Quantity:  30 tablet   Refills:  1       risperiDONE 0.25 MG tablet   Commonly known as:  risperDAL        Dose:  0.25 mg   Take 1 tablet (0.25 mg) by mouth 2 times daily   Quantity:  60 tablet   Refills:  1         CONTINUE these medicines which have NOT CHANGED       Dose / Directions    albuterol 108 (90 Base) MCG/ACT Inhaler   Commonly known as:  PROAIR HFA/PROVENTIL HFA/VENTOLIN HFA        Dose:  2 puff   Inhale 2 puffs into the lungs every 6 hours as needed for shortness of breath / dyspnea or wheezing   Quantity:  1 Inhaler   Refills:  0       EXCEDRIN PO        Refills:  0            Where to get your medicines      These medications were sent to [facility] [address]  [address] [address]     Phone:  [phone]      escitalopram 5 MG tablet     hydrOXYzine 25 MG tablet     mirtazapine 7.5 MG Tabs tablet     risperiDONE 0.25 MG tablet                  Mental Status Examination:   Prasanth is a 43-year-old female wearing pajamas.  Her speech is of an appropriate rate and tone in her behavior is appropriate without abnormal movements.  Her affect is smiling and laughing appropriately.  Her mood she describes as better.  Her thought content consists of the above without thoughts of harming herself or others  or current delusions.  Her thought process is logical without looseness of association.  She does have some abnormal perceptions.  She is alert and aware of her current location and circumstances.  Her attention and concentration appear adequate.  Her cognition and fund of knowledge appear normal.  Her long-term/short-term/remote memory appears intact.  Her insight and judgment are both fair.         Discharge Plan:     Reason for your hospital stay   Depression     Activity   Your activity upon discharge: activity as tolerated     Discharge Instructions   1. Please do not harm yourself or others.  2. Please continue to take your medications.  3. Please follow up with your outpatient care team.  4. Please do not take drugs or alcohol as this will worsen your condition.  5. Please do not take more than the prescribed doses of medications as this may make them dangerous.   6. Please follow your safety plan of action.  7. Please call crisis if having trouble.  8. If having thoughts of harming self or others please come in to the emergency department as soon as possible.     Full Code     Diet   Follow this diet upon discharge: Orders Placed This Encounter     Regular Diet Adult             Further instructions from your care team        Behavioral Discharge Planning and Instructions      Summary:  You were admitted on 5/3/2018  due to auditory hallucinations, delusions, depression.  You were treated by Dr Ian Cornejo MD and discharged on 05/07/18   from [address] to your home.      Principal Diagnosis:       Health Care Follow-up Appointments:   Dr. Lauren Abraham on Monday, May 21st at 7:30am (paperwork) and then meet with the provider for initial assessment from 8-9am.  [facility]  [address] [place] [address] [address]  [place], [place]  [phone]  FAX - [phone]                      Attend all scheduled appointments with your outpatient providers. Call at least 24  hours in advance if you need to reschedule an appointment to ensure continued access to your outpatient providers.   Major Treatments, Procedures and Findings:  You were provided with: a psychiatric assessment, assessed for medical stability, medication evaluation and/or management, group therapy and milieu management    Symptoms to Report: increased confusion, losing more sleep, mood getting worse or thoughts of suicide    Early warning signs can include: increased depression or anxiety sleep disturbances increased thoughts or behaviors of suicide or self-harm  increased unusual thinking, such as paranoia or hearing voices    Safety and Wellness:  Take all medicines as directed.  Make no changes unless your doctor suggests them.      Follow treatment recommendations.  Refrain from alcohol and non-prescribed drugs.  If there is a concern for safety, call 911.    Resources:   Crisis Intervention: [phone] or [phone] (TTY: [phone]).  Call anytime for help.    The treatment team has appreciated the opportunity to work with you.     If you have any questions or concerns our unit number is [phone].            Please send copy to [facility]    During hospitalizations patients have perpetuating, complicating, situational, acute, and chronic conditions that lead to risk for suicidality or homicidality. During hospitalizations we mitigate any modifiable risk factors that may have been identified in the history and physical examination and throughout the hospitalization. Chronic non-modifiable risk factors are not able to be changed with acute hospitalization. As a patient that is discharging these risk factors have been modified as much as possible and a supportive network and safety plan has been put in place. Further modifications and assistance will have to be obtained in the outpatient setting and the inpatient hospitalization team is no longer responsible for outcomes.    Swapnil Barrett  [facility]      The following document has been created with voice recognition software and may contain unintentional word substitutions.      Non clinically relevant CMS requirements:  Clinical Global Impressions  First:  Considering your total clinical experience with this particular patient population, how severe are the patient's symptoms at this time?: 5 (05/04/18 1913)  Compared to the patient's condition at the START of treatment, this patient's condition is:: 5 (05/04/18 1913)  Most recent:  Considering your total clinical experience with this particular patient population, how severe are the patient's symptoms at this time?: 2 (05/07/18 1635)  Compared to the patient's condition at the START of treatment, this patient's condition is:: 2 (05/07/18 1635)    # Pain Assessment:  Current Pain Score 5/7/2018   Patient currently in pain? yes   Pain score (0-10) -   Pain location Back   Pain descriptors Aching       Any incidence of pain both chronic or acute reported will be documented in above documentation though further documentation can also be found in the internal medicine documentation or pain specialist documentation.

## 2018-05-15 ENCOUNTER — HEALTH MAINTENANCE LETTER (OUTPATIENT)
Age: 43
End: 2018-05-15

## 2018-08-08 ENCOUNTER — OFFICE VISIT (OUTPATIENT)
Dept: FAMILY MEDICINE | Facility: CLINIC | Age: 43
End: 2018-08-08
Payer: COMMERCIAL

## 2018-08-08 VITALS
OXYGEN SATURATION: 98 % | WEIGHT: 171.3 LBS | BODY MASS INDEX: 27.65 KG/M2 | SYSTOLIC BLOOD PRESSURE: 113 MMHG | HEART RATE: 82 BPM | DIASTOLIC BLOOD PRESSURE: 74 MMHG | TEMPERATURE: 98.1 F

## 2018-08-08 DIAGNOSIS — J45.20 MILD INTERMITTENT ASTHMA WITHOUT COMPLICATION: Primary | ICD-10-CM

## 2018-08-08 DIAGNOSIS — H10.12 ALLERGIC CONJUNCTIVITIS, LEFT: ICD-10-CM

## 2018-08-08 DIAGNOSIS — J30.2 SEASONAL ALLERGIC RHINITIS, UNSPECIFIED CHRONICITY, UNSPECIFIED TRIGGER: ICD-10-CM

## 2018-08-08 PROCEDURE — 99204 OFFICE O/P NEW MOD 45 MIN: CPT | Performed by: FAMILY MEDICINE

## 2018-08-08 RX ORDER — ALBUTEROL SULFATE 90 UG/1
2 AEROSOL, METERED RESPIRATORY (INHALATION) EVERY 6 HOURS PRN
Qty: 1 INHALER | Refills: 1 | Status: SHIPPED | OUTPATIENT
Start: 2018-08-08 | End: 2019-01-08

## 2018-08-08 RX ORDER — OLOPATADINE HYDROCHLORIDE 2 MG/ML
1 SOLUTION/ DROPS OPHTHALMIC DAILY
Qty: 1 BOTTLE | Refills: 0 | Status: SHIPPED | OUTPATIENT
Start: 2018-08-08 | End: 2019-01-08

## 2018-08-08 RX ORDER — FLUTICASONE PROPIONATE 50 MCG
2 SPRAY, SUSPENSION (ML) NASAL DAILY
Qty: 1 BOTTLE | Refills: 1 | Status: SHIPPED | OUTPATIENT
Start: 2018-08-08 | End: 2019-01-08

## 2018-08-08 ASSESSMENT — ANXIETY QUESTIONNAIRES
2. NOT BEING ABLE TO STOP OR CONTROL WORRYING: SEVERAL DAYS
GAD7 TOTAL SCORE: 7
1. FEELING NERVOUS, ANXIOUS, OR ON EDGE: SEVERAL DAYS
5. BEING SO RESTLESS THAT IT IS HARD TO SIT STILL: SEVERAL DAYS
6. BECOMING EASILY ANNOYED OR IRRITABLE: SEVERAL DAYS
3. WORRYING TOO MUCH ABOUT DIFFERENT THINGS: SEVERAL DAYS
7. FEELING AFRAID AS IF SOMETHING AWFUL MIGHT HAPPEN: SEVERAL DAYS
IF YOU CHECKED OFF ANY PROBLEMS ON THIS QUESTIONNAIRE, HOW DIFFICULT HAVE THESE PROBLEMS MADE IT FOR YOU TO DO YOUR WORK, TAKE CARE OF THINGS AT HOME, OR GET ALONG WITH OTHER PEOPLE: SOMEWHAT DIFFICULT

## 2018-08-08 ASSESSMENT — PATIENT HEALTH QUESTIONNAIRE - PHQ9: 5. POOR APPETITE OR OVEREATING: SEVERAL DAYS

## 2018-08-08 NOTE — MR AVS SNAPSHOT
After Visit Summary   8/8/2018    Prasanth Coy    MRN: [id number]           Patient Information     Date Of Birth          1975        Visit Information        Provider Department      8/8/2018 12:00 PM Tsering Lagos MD [facility]        Today's Diagnoses     Mild intermittent asthma without complication    -  1    Seasonal allergic rhinitis, unspecified chronicity, unspecified trigger        Allergic conjunctivitis, left          Care Instructions    Recommend using allegra daily rather than as needed  Follow up in 1 month or sooner if needed  Conjunctivitis, Allergic    Conjunctivitis is an irritation of a thin membrane in the eye. This membrane is called the conjunctiva. It covers the white of the eye and the inside of the eyelid. The condition is often called pink eye or red eye because the eye looks pink or red. The eye can also be swollen. A thick fluid may leak from the eyelid. The eye may itch and burn, and feel gritty or scratchy.  Allergic conjunctivitis is caused by an allergen. Allergens are substances that cause the body to react with certain symptoms. Allergens that cause eye irritation include things such as house dust or pollen in the air. This can occur seasonally, most often in the spring.  Home care    Eye drops may be prescribed to reduce itching and redness. Use these as directed. Otherwise, over-the-counter decongestant eye drops may be used.    Apply a cool compress (towel soaked in cool water) to the affected eye 3 to 4 times a day to reduce swelling and itching.    It is common to have mucus drainage during the night, causing the eyelids to become crusted by morning. Use a warm, wet cloth to wipe this away. You may also use saline irrigating solution or artificial tears to rinse away mucus in the eye. Don't patch the eye.    You may use acetaminophen or ibuprofen to control pain, unless another medicine was prescribed. (Note: If you have  chronic liver or kidney disease, or if you have ever had a stomach ulcer or gastrointestinal bleeding, talk with your healthcare provider before using these medicines.)    Don't wear contact lenses until your eyes have healed and all symptoms are gone.  Follow-up care  Follow up with your healthcare provider, or as advised.  When to seek medical advice  Call your healthcare provider right away if any of these occur:    Increased eyelid swelling    New or worsening drainage from the eye    Increasing redness around the eye    Facial swelling  Date Last Reviewed: 7/1/2017 2000-2017 [employer]. [address] [place], [place] [address]. All rights reserved. This information is not intended as a substitute for professional medical care. Always follow your healthcare professional's instructions.        Seasonal Allergy  Seasonal allergy is also called hay fever. It may occur after a person is exposed to pollens released from grasses, weeds, trees and shrubs. This type of allergy occurs during the spring and summer when the pollen contacts the lining of the nose, eyes, eyelids, sinuses and throat. This causes histamine to be released from the tissues. Histamine causes itching and swelling. This may produce a watery discharge from the eyes or nose. Violent sneezing, nasal congestion, post-nasal drip, itching of the eyes, nose, throat and mouth, scratchy throat, and dry cough may also occur.  Home care  Seasonal allergy cannot be cured, but symptoms can be reduced by these measures:    Stay away from or limit your time near the allergen as much as you can:    ? Stay indoors on windy days of pollen season.   ? Keep windows and doors closed. Use air conditioning instead in your home and car. This filters the air.  ? Change air conditioner filters often.  ? Take a shower, wash your hair, and change clothes after being outdoors.  ? Put on a NIOSH-rated 95 filter mask when working outdoors. Before going  outside, take your allergy medicine as advised by your healthcare provider.    Decongestant pills and sprays reduce tissue swelling and watery discharge. Overuse of nasal decongestant sprays may make symptoms worse. Do not use these more often than recommended. Sometimes you can experience a rebound effect (symptoms worsen), when stopping them. Talk to your healthcare provider or pharmacist about these medicines before taking them, especially if you have high blood pressure or heart problems.     Antihistamines block the release of histamine during the allergic response. They work better when taken before symptoms develop. Unless a prescription antihistamine was prescribed, you can take over-the-counter antihistamines that do not cause drowsiness.  Ask your pharmacist for suggestions.    Steroid nasal sprays or oral steroids may also be prescribed for more severe symptoms. These help to reduce the local inflammation that can add to the allergic response.    If you have asthma, pollen season may make your asthma symptoms worse. It is important that you use your asthma medicines as directed during this time to prevent or treat attacks. Some persons with asthma have asthma symptoms that get worse when they take antihistamines. This is due to the drying effect on the lungs. If you notice this, stop the antihistamines, drink extra fluids and notify your doctor.    If you have sinus congestion or drainage, a saline nasal rinse may give relief. A saline nasal rinse lessens the swelling and clears excess mucus. This allows sinuses to drain. Prepackaged kits are sold at most drug stores. These contain pre-mixed salt packets and an irrigation device.  Follow-up care  Follow up with your healthcare provider, or as advised. If you have been referred to a specialist, make an appointment promptly.  When to seek medical advice  Call your healthcare provider right away for any of the following:    Facial, ear or sinus pain; colored  drainage from the nose    Headaches    You have asthma and your asthma symptoms do not respond to the usual doses of your medicine    Cough with colored sputum (mucus)    Fever of 100.4 F (38 C) or higher, or as directed by the healthcare provider  Call 911  Call 911 if any of these occur:    Trouble breathing or swallowing, wheezing    Hoarse voice, trouble speaking, or drooling    Confusion    Very drowsy or trouble awakening    Fainting or loss of consciousness    Rapid heart rate, or weak pulse    Low blood pressure    Feeling of doom    Nausea, vomiting, abdominal pain, diarrhea    Vomiting blood, or large amounts of blood in stool    Seizure    Cold, moist, or pale (blue in color) skin  Date Last Reviewed: 5/1/2017 2000-2017 [employer]. [address] [place] [address], [place] [address]. All rights reserved. This information is not intended as a substitute for professional medical care. Always follow your healthcare professional's instructions.                Follow-ups after your visit        Follow-up notes from your care team     Return in about 1 month (around 9/8/2018).      Who to contact     If you have questions or need follow up information about today's clinic visit or your schedule please contact [facility] directly at [phone].  Normal or non-critical lab and imaging results will be communicated to you by Aternityhart, letter or phone within 4 business days after the clinic has received the results. If you do not hear from us within 7 days, please contact the clinic through Aternityhart or phone. If you have a critical or abnormal lab result, we will notify you by phone as soon as possible.  Submit refill requests through [employer] or call your pharmacy and they will forward the refill request to us. Please allow 3 business days for your refill to be completed.          Additional Information About Your Visit        Aternity[employer]IP Information     [employer] gives you secure access to your  electronic health record. If you see a primary care provider, you can also send messages to your care team and make appointments. If you have questions, please call your primary care clinic.  If you do not have a primary care provider, please call [phone] and they will assist you.        Care EveryWhere ID     This is your Care EveryWhere ID. This could be used by other organizations to access your [place] medical records  [id number]        Your Vitals Were     Pulse Temperature Last Period Pulse Oximetry Breastfeeding? BMI (Body Mass Index)    82 98.1  F (36.7  C) (Oral) 08/06/2018 (Exact Date) 98% No 27.65 kg/m2       Blood Pressure from Last 3 Encounters:   08/08/18 113/74   05/06/18 124/81   03/26/18 109/73    Weight from Last 3 Encounters:   08/08/18 171 lb 4.8 oz (77.7 kg)   05/04/18 163 lb 12.8 oz (74.3 kg)   03/26/18 165 lb (74.8 kg)              Today, you had the following     No orders found for display         Today's Medication Changes          These changes are accurate as of 8/8/18 12:14 PM.  If you have any questions, ask your nurse or doctor.               Start taking these medicines.        Dose/Directions    fluticasone 50 MCG/ACT spray   Commonly known as:  FLONASE   Used for:  Seasonal allergic rhinitis, unspecified chronicity, unspecified trigger   Started by:  Tsering Lagos MD        Dose:  2 spray   Spray 2 sprays into both nostrils daily   Quantity:  1 Bottle   Refills:  1       olopatadine HCl 0.2 % Soln   Commonly known as:  PATADAY   Used for:  Allergic conjunctivitis, left   Started by:  Tsering Laogs MD        Dose:  1 drop   Place 1 drop Into the left eye daily   Quantity:  1 Bottle   Refills:  0            Where to get your medicines      These medications were sent to [id number] - [place]OU - [address] AT [facility] [address]  [address] [address]     Phone:  [phone]     albuterol 108 (90 Base) MCG/ACT  Inhaler    fluticasone 50 MCG/ACT spray    olopatadine HCl 0.2 % Soln                Primary Care Provider Office Phone # Fax #    [facility] [phone] [address]  [facility] [address]        Equal Access to Services     RADHA GEE : Hadii aad ku hadasho Soomaali, waaxda luqadaha, qaybta kaalmada adeegyada, waxay idiin hayaan ademingo luisa larafy . So [facility] [phone].    ATENCIÓN: Si habla español, tiene a pink disposición servicios gratuitos de asistencia lingüística. Rositaame al [phone].    We comply with applicable federal civil rights laws and [place] laws. We do not discriminate on the basis of race, color, national origin, age, disability, sex, sexual orientation, or gender identity.            Thank you!     Thank you for choosing [facility]  for your care. Our goal is always to provide you with excellent care. Hearing back from our patients is one way we can continue to improve our services. Please take a few minutes to complete the written survey that you may receive in the mail after your visit with us. Thank you!             Your Updated Medication List - Protect others around you: Learn how to safely use, store and throw away your medicines at www.disposemymeds.org.          This list is accurate as of 8/8/18 12:14 PM.  Always use your most recent med list.                   Brand Name Dispense Instructions for use Diagnosis    albuterol 108 (90 Base) MCG/ACT Inhaler    PROAIR HFA/PROVENTIL HFA/VENTOLIN HFA    1 Inhaler    Inhale 2 puffs into the lungs every 6 hours as needed for shortness of breath / dyspnea or wheezing    Mild intermittent asthma without complication       escitalopram 5 MG tablet    LEXAPRO    30 tablet    Take 1 tablet (5 mg) by mouth daily    Anxiety, Severe single current episode of major depressive disorder, with psychotic features (H)       EXCEDRIN PO           fluticasone 50 MCG/ACT spray    FLONASE    1  Bottle    Spray 2 sprays into both nostrils daily    Seasonal allergic rhinitis, unspecified chronicity, unspecified trigger       hydrOXYzine 25 MG tablet    ATARAX    30 tablet    Take 1 tablet (25 mg) by mouth every 4 hours as needed for anxiety    Anxiety       mirtazapine 7.5 MG Tabs tablet    REMERON    30 tablet    Take 1 tablet (7.5 mg) by mouth At Bedtime    Anxiety, Severe single current episode of major depressive disorder, with psychotic features (H)       olopatadine HCl 0.2 % Soln    PATADAY    1 Bottle    Place 1 drop Into the left eye daily    Allergic conjunctivitis, left       risperiDONE 0.25 MG tablet    risperDAL    60 tablet    Take 1 tablet (0.25 mg) by mouth 2 times daily    Severe single current episode of major depressive disorder, with psychotic features (H)

## 2018-08-08 NOTE — PATIENT INSTRUCTIONS
Recommend using allegra daily rather than as needed  Follow up in 1 month or sooner if needed  Conjunctivitis, Allergic    Conjunctivitis is an irritation of a thin membrane in the eye. This membrane is called the conjunctiva. It covers the white of the eye and the inside of the eyelid. The condition is often called pink eye or red eye because the eye looks pink or red. The eye can also be swollen. A thick fluid may leak from the eyelid. The eye may itch and burn, and feel gritty or scratchy.  Allergic conjunctivitis is caused by an allergen. Allergens are substances that cause the body to react with certain symptoms. Allergens that cause eye irritation include things such as house dust or pollen in the air. This can occur seasonally, most often in the spring.  Home care    Eye drops may be prescribed to reduce itching and redness. Use these as directed. Otherwise, over-the-counter decongestant eye drops may be used.    Apply a cool compress (towel soaked in cool water) to the affected eye 3 to 4 times a day to reduce swelling and itching.    It is common to have mucus drainage during the night, causing the eyelids to become crusted by morning. Use a warm, wet cloth to wipe this away. You may also use saline irrigating solution or artificial tears to rinse away mucus in the eye. Don't patch the eye.    You may use acetaminophen or ibuprofen to control pain, unless another medicine was prescribed. (Note: If you have chronic liver or kidney disease, or if you have ever had a stomach ulcer or gastrointestinal bleeding, talk with your healthcare provider before using these medicines.)    Don't wear contact lenses until your eyes have healed and all symptoms are gone.  Follow-up care  Follow up with your healthcare provider, or as advised.  When to seek medical advice  Call your healthcare provider right away if any of these occur:    Increased eyelid swelling    New or worsening drainage from the eye    Increasing  redness around the eye    Facial swelling  Date Last Reviewed: 7/1/2017 2000-2017 [employer]. [address] [place] [address], [place] [address]. All rights reserved. This information is not intended as a substitute for professional medical care. Always follow your healthcare professional's instructions.        Seasonal Allergy  Seasonal allergy is also called hay fever. It may occur after a person is exposed to pollens released from grasses, weeds, trees and shrubs. This type of allergy occurs during the spring and summer when the pollen contacts the lining of the nose, eyes, eyelids, sinuses and throat. This causes histamine to be released from the tissues. Histamine causes itching and swelling. This may produce a watery discharge from the eyes or nose. Violent sneezing, nasal congestion, post-nasal drip, itching of the eyes, nose, throat and mouth, scratchy throat, and dry cough may also occur.  Home care  Seasonal allergy cannot be cured, but symptoms can be reduced by these measures:    Stay away from or limit your time near the allergen as much as you can:    ? Stay indoors on windy days of pollen season.   ? Keep windows and doors closed. Use air conditioning instead in your home and car. This filters the air.  ? Change air conditioner filters often.  ? Take a shower, wash your hair, and change clothes after being outdoors.  ? Put on a NIOSH-rated 95 filter mask when working outdoors. Before going outside, take your allergy medicine as advised by your healthcare provider.    Decongestant pills and sprays reduce tissue swelling and watery discharge. Overuse of nasal decongestant sprays may make symptoms worse. Do not use these more often than recommended. Sometimes you can experience a rebound effect (symptoms worsen), when stopping them. Talk to your healthcare provider or pharmacist about these medicines before taking them, especially if you have high blood pressure or heart  problems.     Antihistamines block the release of histamine during the allergic response. They work better when taken before symptoms develop. Unless a prescription antihistamine was prescribed, you can take over-the-counter antihistamines that do not cause drowsiness.  Ask your pharmacist for suggestions.    Steroid nasal sprays or oral steroids may also be prescribed for more severe symptoms. These help to reduce the local inflammation that can add to the allergic response.    If you have asthma, pollen season may make your asthma symptoms worse. It is important that you use your asthma medicines as directed during this time to prevent or treat attacks. Some persons with asthma have asthma symptoms that get worse when they take antihistamines. This is due to the drying effect on the lungs. If you notice this, stop the antihistamines, drink extra fluids and notify your doctor.    If you have sinus congestion or drainage, a saline nasal rinse may give relief. A saline nasal rinse lessens the swelling and clears excess mucus. This allows sinuses to drain. Prepackaged kits are sold at most drug stores. These contain pre-mixed salt packets and an irrigation device.  Follow-up care  Follow up with your healthcare provider, or as advised. If you have been referred to a specialist, make an appointment promptly.  When to seek medical advice  Call your healthcare provider right away for any of the following:    Facial, ear or sinus pain; colored drainage from the nose    Headaches    You have asthma and your asthma symptoms do not respond to the usual doses of your medicine    Cough with colored sputum (mucus)    Fever of 100.4 F (38 C) or higher, or as directed by the healthcare provider  Call 911  Call 911 if any of these occur:    Trouble breathing or swallowing, wheezing    Hoarse voice, trouble speaking, or drooling    Confusion    Very drowsy or trouble awakening    Fainting or loss of consciousness    Rapid heart  rate, or weak pulse    Low blood pressure    Feeling of doom    Nausea, vomiting, abdominal pain, diarrhea    Vomiting blood, or large amounts of blood in stool    Seizure    Cold, moist, or pale (blue in color) skin  Date Last Reviewed: 5/1/2017 2000-2017 [employer]. [address] [place] [id number]. All rights reserved. This information is not intended as a substitute for professional medical care. Always follow your healthcare professional's instructions.

## 2018-08-08 NOTE — PROGRESS NOTES
SUBJECTIVE:   Prasanth Coy is a 43 year old female who presents to clinic today for the following health issues:      Asthma Follow-Up    Was ACT completed today?  Yes  No flowsheet data found.    Recent asthma triggers that patient is dealing with: pollens, humidity and cold air          Amount of exercise or physical activity: 2-3 days/week for an average of greater than 60 minutes    Problems taking medications regularly: No    Medication side effects: weight gain    Diet: protein shakes 2 meals     Last flare up was about a year or so ago. Current flare up has been over the last 2 weeks.   Notes her allergies are also very bothersome - worsening symptoms including itching eyes, congestion, sneezing.   Takes allegra as needed.         Problem list and histories reviewed & adjusted, as indicated.  Additional history: as documented    Patient Active Problem List   Diagnosis     Hallucination     Major depressive disorder with psychotic features (H)     Past Surgical History:   Procedure Laterality Date      SECTION       ORTHOPEDIC SURGERY         Social History   Substance Use Topics     Smoking status: Never Smoker     Smokeless tobacco: Never Used     Alcohol use Yes      Comment: occasionally     Family History   Problem Relation Age of Onset     Depression Mother      Depression Sister            Reviewed and updated as needed this visit by clinical staff  Tobacco  Allergies  Meds  Med Hx  Surg Hx  Fam Hx  Soc Hx      Reviewed and updated as needed this visit by Provider         ROS:  Constitutional, HEENT, cardiovascular, pulmonary, GI, , musculoskeletal, neuro, skin, endocrine and psych systems are negative, except as otherwise noted.    OBJECTIVE:     /74 (BP Location: Left arm, Patient Position: Sitting, Cuff Size: Adult Regular)  Pulse 82  Temp 98.1  F (36.7  C) (Oral)  Wt 171 lb 4.8 oz (77.7 kg)  LMP 2018 (Exact Date)  Breastfeeding? No  BMI 27.65 kg/m2  Body mass  index is 27.65 kg/(m^2).  GENERAL: healthy, alert and no distress  EYES: PERRL and conjunctiva/corneas- conjunctival injection OD  NECK: no adenopathy  RESP: lungs clear to auscultation - no rales, rhonchi or wheezes  CV: regular rate and rhythm, normal S1 S2  MS: no edema  SKIN: no suspicious lesions or rashes  PSYCH: mentation appears normal, affect normal/bright    Diagnostic Test Results:  none     ASSESSMENT/PLAN:         1. Mild intermittent asthma without complication  - no wheezing at this time. Refill of alnbuterol inhaler. Will recheck ACT in 1 month if no improvement will consider ICS or singulair   - albuterol (PROAIR HFA/PROVENTIL HFA/VENTOLIN HFA) 108 (90 Base) MCG/ACT Inhaler; Inhale 2 puffs into the lungs every 6 hours as needed for shortness of breath / dyspnea or wheezing  Dispense: 1 Inhaler; Refill: 1    2. Seasonal allergic rhinitis, unspecified chronicity, unspecified trigger  - advised taking allegra daily rather than PRN   - fluticasone (FLONASE) 50 MCG/ACT spray; Spray 2 sprays into both nostrils daily  Dispense: 1 Bottle; Refill: 1    3. Allergic conjunctivitis, left  - will start on pataday   - olopatadine HCl (PATADAY) 0.2 % SOLN; Place 1 drop Into the left eye daily  Dispense: 1 Bottle; Refill: 0    See Patient Instructions    Tsering Lagos MD  [facility]

## 2018-08-10 ASSESSMENT — ANXIETY QUESTIONNAIRES: GAD7 TOTAL SCORE: 7

## 2018-08-10 ASSESSMENT — PATIENT HEALTH QUESTIONNAIRE - PHQ9: SUM OF ALL RESPONSES TO PHQ QUESTIONS 1-9: 8

## 2018-08-10 ASSESSMENT — ASTHMA QUESTIONNAIRES: ACT_TOTALSCORE: 11

## 2019-01-08 ENCOUNTER — OFFICE VISIT (OUTPATIENT)
Dept: FAMILY MEDICINE | Facility: CLINIC | Age: 44
End: 2019-01-08
Payer: COMMERCIAL

## 2019-01-08 VITALS
TEMPERATURE: 97.3 F | DIASTOLIC BLOOD PRESSURE: 78 MMHG | SYSTOLIC BLOOD PRESSURE: 116 MMHG | BODY MASS INDEX: 25.39 KG/M2 | OXYGEN SATURATION: 99 % | HEART RATE: 72 BPM | RESPIRATION RATE: 16 BRPM | HEIGHT: 66 IN | WEIGHT: 158 LBS

## 2019-01-08 DIAGNOSIS — J01.00 ACUTE NON-RECURRENT MAXILLARY SINUSITIS: Primary | ICD-10-CM

## 2019-01-08 DIAGNOSIS — J30.1 SEASONAL ALLERGIC RHINITIS DUE TO POLLEN: ICD-10-CM

## 2019-01-08 DIAGNOSIS — J45.20 MILD INTERMITTENT ASTHMA WITHOUT COMPLICATION: ICD-10-CM

## 2019-01-08 PROCEDURE — 99214 OFFICE O/P EST MOD 30 MIN: CPT | Performed by: PHYSICIAN ASSISTANT

## 2019-01-08 RX ORDER — FLUTICASONE PROPIONATE 50 MCG
1-2 SPRAY, SUSPENSION (ML) NASAL DAILY
Qty: 16 G | Refills: 3 | Status: SHIPPED | OUTPATIENT
Start: 2019-01-08 | End: 2023-03-02

## 2019-01-08 RX ORDER — ALBUTEROL SULFATE 90 UG/1
2 AEROSOL, METERED RESPIRATORY (INHALATION) ONCE
Status: DISCONTINUED | OUTPATIENT
Start: 2019-01-08 | End: 2019-04-12

## 2019-01-08 ASSESSMENT — MIFFLIN-ST. JEOR: SCORE: 1398.43

## 2019-01-08 NOTE — PROGRESS NOTES
SUBJECTIVE:   Prasanth Coy is a 43 year old female who presents to clinic today for the following health issues:      Acute Illness   Acute illness concerns: sinus  Onset: month    Fever: no     Chills/Sweats: no     Headache (location?): YES    Sinus Pressure:YES    Conjunctivitis:  no    Ear Pain: no    Rhinorrhea: YES    Congestion: YES    Sore Throat: no      Cough: YES-non productive-with shortness of breath    Wheeze: YES    Decreased Appetite: no     Nausea: no     Vomiting: no     Diarrhea:  no    Dysuria/Freq.: no     Fatigue/Achiness: YES-fatigue    Sick/Strep Exposure: no      Therapies Tried and outcome: tried saline solution to clear out no relief        Problem list and histories reviewed & adjusted, as indicated.  Additional history: as documented    Patient Active Problem List   Diagnosis     Hallucination     Major depressive disorder with psychotic features (H)     Seasonal allergic rhinitis, unspecified chronicity, unspecified trigger     Past Surgical History:   Procedure Laterality Date      SECTION       ORTHOPEDIC SURGERY         Social History     Tobacco Use     Smoking status: Never Smoker     Smokeless tobacco: Never Used   Substance Use Topics     Alcohol use: Yes     Comment: occasionally     Family History   Problem Relation Age of Onset     Depression Mother      Depression Sister          Current Outpatient Medications   Medication Sig Dispense Refill     amoxicillin-clavulanate (AUGMENTIN) 875-125 MG tablet Take 1 tablet by mouth 2 times daily for 10 days 20 tablet 0     Aspirin-Acetaminophen-Caffeine (EXCEDRIN PO)        escitalopram (LEXAPRO) 5 MG tablet Take 1 tablet (5 mg) by mouth daily 30 tablet 1     fluticasone (FLONASE) 50 MCG/ACT nasal spray Spray 1-2 sprays into both nostrils daily 16 g 3     hydrOXYzine (ATARAX) 25 MG tablet Take 1 tablet (25 mg) by mouth every 4 hours as needed for anxiety 30 tablet 1     mirtazapine (REMERON) 7.5 MG TABS tablet Take 1 tablet  "(7.5 mg) by mouth At Bedtime 30 tablet 1     risperiDONE (RISPERDAL) 0.25 MG tablet Take 1 tablet (0.25 mg) by mouth 2 times daily 60 tablet 1     No Known Allergies  BP Readings from Last 3 Encounters:   01/08/19 116/78   08/08/18 113/74   05/06/18 124/81    Wt Readings from Last 3 Encounters:   01/08/19 71.7 kg (158 lb)   08/08/18 77.7 kg (171 lb 4.8 oz)   05/04/18 74.3 kg (163 lb 12.8 oz)                    Reviewed and updated as needed this visit by clinical staff  Tobacco  Allergies  Meds  Problems  Med Hx  Surg Hx  Fam Hx  Soc Hx        Reviewed and updated as needed this visit by Provider  Tobacco  Allergies  Meds  Problems  Med Hx  Surg Hx  Fam Hx         ROS:  Constitutional, HEENT, cardiovascular, pulmonary, gi and gu systems are negative, except as otherwise noted.    OBJECTIVE:     /78 (BP Location: Right arm, Patient Position: Chair, Cuff Size: Adult Large)   Pulse 72   Temp 97.3  F (36.3  C) (Oral)   Resp 16   Ht 1.676 m (5' 6\")   Wt 71.7 kg (158 lb)   SpO2 99%   BMI 25.50 kg/m    Body mass index is 25.5 kg/m .  GENERAL: alert and no distress  HENT: normal cephalic/atraumatic, both ears: clear effusion, nasal mucosa edematous , oropharynx clear and oral mucous membranes moist  NECK: no adenopathy, no asymmetry, masses, or scars and thyroid normal to palpation  RESP: no rales , no rhonchi and intermittent inspiratory wheezes throughout  CV: regular rates and rhythm, normal S1 S2, no S3 or S4, no murmur, click or rub  PSYCH: mentation appears normal, affect normal/bright    Diagnostic Test Results:  none     ASSESSMENT/PLAN:     (J01.00) Acute non-recurrent maxillary sinusitis  (primary encounter diagnosis)  Comment: evident on history and exam. Given history of asthma with minimal wheezing on exam and course length, abx to be given. flonase to be restarted and further otc management discussed. See patient instructions. If no improvement in 1 week, patient should RTC. Sooner " if worsening.   Plan: amoxicillin-clavulanate (AUGMENTIN) 875-125 MG         tablet        -Medication use and side effects discussed with the patient. Patient is in complete understanding and agreement with plan.       (J45.20) Mild intermittent asthma without complication  Comment: as above. Needing refill  Plan: albuterol (PROAIR HFA/PROVENTIL HFA/VENTOLIN         HFA) 108 (90 Base) MCG/ACT inhaler 2 puff            (J30.1) Seasonal allergic rhinitis due to pollen  Comment:   Plan: fluticasone (FLONASE) 50 MCG/ACT nasal spray              Follow up: as above     Aj Urbina PA-C  [facility]

## 2019-01-08 NOTE — PATIENT INSTRUCTIONS
Patient Education     Sinusitis (Antibiotic Treatment)    The sinuses are air-filled spaces within the bones of the face. They connect to the inside of the nose. Sinusitis is an inflammation of the tissue that lines the sinuses. Sinusitis can occur during a cold. It can also happen due to allergies to pollens and other particles in the air. Sinusitis can cause symptoms of sinus congestion and a feeling of fullness. A sinus infection causes fever, headache, and facial pain. There is often green or yellow fluid draining from the nose or into the back of the throat (post-nasal drip). You have been given antibiotics to treat this condition.  Home care    Take the full course of antibiotics as instructed. Do not stop taking them, even when you feel better.    Drink plenty of water, hot tea, and other liquids. This may help thin nasal mucus. It also may help your sinuses drain fluids.    Heat may help soothe painful areas of your face. Use a towel soaked in hot water. Or,  the shower and direct the warm spray onto your face. Using a vaporizer along with a menthol rub at night may also help soothe symptoms.     An expectorant with guaifenesin may help thin nasal mucus and help your sinuses drain fluids.    You can use an over-the-counter decongestant, unless a similar medicine was prescribed to you. Nasal sprays work the fastest. Use one that contains phenylephrine or oxymetazoline. First blow your nose gently. Then use the spray. Do not use these medicines more often than directed on the label. If you do, your symptoms may get worse. You may also take pills that contain pseudoephedrine. Don t use products that combine multiple medicines. This is because side effects may be increased. Read labels. You can also ask the pharmacist for help. (People with high blood pressure should not use decongestants. They can raise blood pressure.)    Over-the-counter antihistamines may help if allergies contributed to your  sinusitis.      Do not use nasal rinses or irrigation during an acute sinus infection, unless your healthcare provider tells you to. Rinsing may spread the infection to other areas in your sinuses.    Use acetaminophen or ibuprofen to control pain, unless another pain medicine was prescribed to you. If you have chronic liver or kidney disease or ever had a stomach ulcer, talk with your healthcare provider before using these medicines. (Aspirin should never be taken by anyone under age 18 who is ill with a fever. It may cause severe liver damage.)    Don't smoke. This can make symptoms worse.  Follow-up care  Follow up with your healthcare provider or our staff if you are better in 1 week.  When to seek medical advice  Call your healthcare provider if any of these occur:    Facial pain or headache that gets worse    Stiff neck    Unusual drowsiness or confusion    Swelling of your forehead or eyelids    Vision problems, such as blurred or double vision    Fever of 100.4 F (38 C) or higher, or as directed by your healthcare provider    Seizure    Breathing problems    Symptoms don't go away in 10 days  Prevention  Here are steps you can take to help prevent an infection:    Keep good hand washing habits.    Don t have close contact with people who have sore throats, colds, or other upper respiratory infections.    Don t smoke, and stay away from secondhand smoke.    Stay up to date with of your vaccines.  Date Last Reviewed: 11/1/2017 2000-2018 [employer]. [address] [place] [address], [place] [address]. All rights reserved. This information is not intended as a substitute for professional medical care. Always follow your healthcare professional's instructions.

## 2019-01-08 NOTE — LETTER
My Depression Action Plan  Name: Prasanth Coy   Date of Birth 1975  Date: 1/8/2019    My doctor: No Ref-Primary, Physician   My clinic: [address] [address] [phone]          GREEN    ZONE   Good Control    What it looks like:     Things are going generally well. You have normal up s and down s. You may even feel depressed from time to time, but bad moods usually last less than a day.   What you need to do:  1. Continue to care for yourself (see self care plan)  2. Check your depression survival kit and update it as needed  3. Follow your physician s recommendations including any medication.  4. Do not stop taking medication unless you consult with your physician first.           YELLOW         ZONE Getting Worse    What it looks like:     Depression is starting to interfere with your life.     It may be hard to get out of bed; you may be starting to isolate yourself from others.    Symptoms of depression are starting to last most all day and this has happened for several days.     You may have suicidal thoughts but they are not constant.   What you need to do:     1. Call your care team, your response to treatment will improve if you keep your care team informed of your progress. Yellow periods are signs an adjustment may need to be made.     2. Continue your self-care, even if you have to fake it!    3. Talk to someone in your support network    4. Open up your depression survival kit           RED    ZONE Medical Alert - Get Help    What it looks like:     Depression is seriously interfering with your life.     You may experience these or other symptoms: You can t get out of bed most days, can t work or engage in other necessary activities, you have trouble taking care of basic hygiene, or basic responsibilities, thoughts of suicide or death that will not go away, self-injurious behavior.     What you need to do:  1. Call your  care team and request a same-day appointment. If they are not available (weekends or after hours) call your local crisis line, emergency room or 911.            Depression Self Care Plan / Survival Kit    Self-Care for Depression  Here s the deal. Your body and mind are really not as separate as most people think.  What you do and think affects how you feel and how you feel influences what you do and think. This means if you do things that people who feel good do, it will help you feel better.  Sometimes this is all it takes.  There is also a place for medication and therapy depending on how severe your depression is, so be sure to consult with your medical provider and/ or Behavioral Health Consultant if your symptoms are worsening or not improving.     In order to better manage my stress, I will:    Exercise  Get some form of exercise, every day. This will help reduce pain and release endorphins, the  feel good  chemicals in your brain. This is almost as good as taking antidepressants!  This is not the same as joining a gym and then never going! (they count on that by the way ) It can be as simple as just going for a walk or doing some gardening, anything that will get you moving.      Hygiene   Maintain good hygiene (Get out of bed in the morning, Make your bed, Brush your teeth, Take a shower, and Get dressed like you were going to work, even if you are unemployed).  If your clothes don't fit try to get ones that do.    Diet  I will strive to eat foods that are good for me, drink plenty of water, and avoid excessive sugar, caffeine, alcohol, and other mood-altering substances.  Some foods that are helpful in depression are: complex carbohydrates, B vitamins, flaxseed, fish or fish oil, fresh fruits and vegetables.    Psychotherapy  I agree to participate in Individual Therapy (if recommended).    Medication  If prescribed medications, I agree to take them.  Missing doses can result in serious side effects.  I  understand that drinking alcohol, or other illicit drug use, may cause potential side effects.  I will not stop my medication abruptly without first discussing it with my provider.    Staying Connected With Others  I will stay in touch with my friends, family members, and my primary care provider/team.    Use your imagination  Be creative.  We all have a creative side; it doesn t matter if it s oil painting, sand castles, or mud pies! This will also kick up the endorphins.    Witness Beauty  (AKA stop and smell the roses) Take a look outside, even in mid-winter. Notice colors, textures. Watch the squirrels and birds.     Service to others  Be of service to others.  There is always someone else in need.  By helping others we can  get out of ourselves  and remember the really important things.  This also provides opportunities for practicing all the other parts of the program.    Humor  Laugh and be silly!  Adjust your TV habits for less news and crime-drama and more comedy.    Control your stress  Try breathing deep, massage therapy, biofeedback, and meditation. Find time to relax each day.     My support system    Clinic Contact:  Phone number:    Contact 1:  Phone number:    Contact 2:  Phone number:    Church/:  Phone number:    Therapist:  Phone number:    Local crisis center:    Phone number:    Other community support:  Phone number:

## 2019-01-08 NOTE — LETTER
My Asthma Action Plan  Name: Prasanth Coy   YOB: 1975  Date: 1/8/2019   My doctor: Aj Urbina PA-C   My clinic: [facility]        My Control Medicine:   My Rescue Medicine:    My Asthma Severity:   Avoid your asthma triggers:   pollens  humidity  cold air            GREEN ZONE   Good Control    I feel good    No cough or wheeze    Can work, sleep and play without asthma symptoms       Take your asthma control medicine every day.     1. If exercise triggers your asthma, take your rescue medication    15 minutes before exercise or sports, and    During exercise if you have asthma symptoms  2. Spacer to use with inhaler: If you have a spacer, make sure to use it with your inhaler             YELLOW ZONE Getting Worse  I have ANY of these:    I do not feel good    Cough or wheeze    Chest feels tight    Wake up at night   1. Keep taking your Green Zone medications  2. Start taking your rescue medicine:    every 20 minutes for up to 1 hour. Then every 4 hours for 24-48 hours.  3. If you stay in the Yellow Zone for more than 12-24 hours, contact your doctor.  4. If you do not return to the Green Zone in 12-24 hours or you get worse, start taking your oral steroid medicine if prescribed by your provider.           RED ZONE Medical Alert - Get Help  I have ANY of these:    I feel awful    Medicine is not helping    Breathing getting harder    Trouble walking or talking    Nose opens wide to breathe       1. Take your rescue medicine NOW  2. If your provider has prescribed an oral steroid medicine, start taking it NOW  3. Call your doctor NOW  4. If you are still in the Red Zone after 20 minutes and you have not reached your doctor:    Take your rescue medicine again and    Call 911 or go to the emergency room right away    See your regular doctor within 2 weeks of an Emergency Room or Urgent Care visit for follow-up treatment.          Annual Reminders:  Meet with Asthma  Educator,  Flu Shot in the Fall, consider Pneumonia Vaccination for patients with asthma (aged 19 and older).    Pharmacy: [id number] - [place], [place] - [address] AT [facility]                      Asthma Triggers  How To Control Things That Make Your Asthma Worse    Triggers are things that make your asthma worse.  Look at the list below to help you find your triggers and what you can do about them.  You can help prevent asthma flare-ups by staying away from your triggers.      Trigger                                                          What you can do   Cigarette Smoke  Tobacco smoke can make asthma worse. Do not allow smoking in your home, car or around you.  Be sure no one smokes at a child s day care or school.  If you smoke, ask your health care provider for ways to help you quit.  Ask family members to quit too.  Ask your health care provider for a referral to Quit Plan to help you quit smoking, or call 1-736-983-PLAN.     Colds, Flu, Bronchitis  These are common triggers of asthma. Wash your hands often.  Don t touch your eyes, nose or mouth.  Get a flu shot every year.     Dust Mites  These are tiny bugs that live in cloth or carpet. They are too small to see. Wash sheets and blankets in hot water every week.   Encase pillows and mattress in dust mite proof covers.  Avoid having carpet if you can. If you have carpet, vacuum weekly.   Use a dust mask and HEPA vacuum.   Pollen and Outdoor Mold  Some people are allergic to trees, grass, or weed pollen, or molds. Try to keep your windows closed.  Limit time out doors when pollen count is high.   Ask you health care provider about taking medicine during allergy season.     Animal Dander  Some people are allergic to skin flakes, urine or saliva from pets with fur or feathers. Keep pets with fur or feathers out of your home.    If you can t keep the pet outdoors, then keep the pet out of your bedroom.  Keep the bedroom door  closed.  Keep pets off cloth furniture and away from stuffed toys.     Mice, Rats, and Cockroaches  Some people are allergic to the waste from these pests.   Cover food and garbage.  Clean up spills and food crumbs.  Store grease in the refrigerator.   Keep food out of the bedroom.   Indoor Mold  This can be a trigger if your home has high moisture. Fix leaking faucets, pipes, or other sources of water.   Clean moldy surfaces.  Dehumidify basement if it is damp and smelly.   Smoke, Strong Odors, and Sprays  These can reduce air quality. Stay away from strong odors and sprays, such as perfume, powder, hair spray, paints, smoke incense, paint, cleaning products, candles and new carpet.   Exercise or Sports  Some people with asthma have this trigger. Be active!  Ask your doctor about taking medicine before sports or exercise to prevent symptoms.    Warm up for 5-10 minutes before and after sports or exercise.     Other Triggers of Asthma  Cold air:  Cover your nose and mouth with a scarf.  Sometimes laughing or crying can be a trigger.  Some medicines and food can trigger asthma.

## 2019-02-05 ENCOUNTER — TELEPHONE (OUTPATIENT)
Dept: FAMILY MEDICINE | Facility: CLINIC | Age: 44
End: 2019-02-05

## 2019-02-05 NOTE — TELEPHONE ENCOUNTER
Panel Management Review      Patient has the following on her problem list: None      Composite cancer screening  Chart review shows that this patient is due/due soon for the following Pap Smear  Summary:    Patient is due/failing the following:   PAP    Action needed:   Patient needs office visit for PE/PAP.    Type of outreach:    panel pool    Questions for provider review:    None                                                                                                                                    Shannan Paulson/FELIX  [place]---[facility]       Chart routed to Care Team .

## 2019-02-05 NOTE — LETTER
[address] [id number] [phone]  February 6, 2019    Prasanth Coy  [address]   [facility] [address]        Dear Prasanth,    I care about your health and have reviewed your health plan. I have reviewed your medical conditions, medication list, and lab results and am making recommendations based on this review, to better manage your health.    You are in particular need of attention regarding:  -Asthma    I am recommending that you:  {recommendations: -Complete and return the attached ASTHMA CONTROL TEST.  If your total score is 19 or less or you have been to the ER or urgent care for your asthma, then please schedule an asthma followup appointment.    \\  Please call us at [phone] (or use [employer]) to address the above recommendations.     Thank you for trusting [facility] and we appreciate the opportunity to serve you.  We look forward to supporting your healthcare needs in the future.    Healthy Regards,    Aj Urbina PA-C

## 2019-02-20 NOTE — TELEPHONE ENCOUNTER
Type of outreach:  Phone, spoke to patient.  Schedule physical and pap with [facility]

## 2019-03-11 ENCOUNTER — OFFICE VISIT (OUTPATIENT)
Dept: FAMILY MEDICINE | Facility: CLINIC | Age: 44
End: 2019-03-11
Payer: COMMERCIAL

## 2019-03-11 VITALS
BODY MASS INDEX: 25.82 KG/M2 | WEIGHT: 160 LBS | DIASTOLIC BLOOD PRESSURE: 79 MMHG | HEART RATE: 98 BPM | TEMPERATURE: 98.1 F | RESPIRATION RATE: 16 BRPM | SYSTOLIC BLOOD PRESSURE: 127 MMHG

## 2019-03-11 DIAGNOSIS — Z00.00 ROUTINE GENERAL MEDICAL EXAMINATION AT A HEALTH CARE FACILITY: Primary | ICD-10-CM

## 2019-03-11 PROCEDURE — 99396 PREV VISIT EST AGE 40-64: CPT | Performed by: FAMILY MEDICINE

## 2019-03-11 PROCEDURE — G0476 HPV COMBO ASSAY CA SCREEN: HCPCS | Performed by: FAMILY MEDICINE

## 2019-03-11 PROCEDURE — G0145 SCR C/V CYTO,THINLAYER,RESCR: HCPCS | Performed by: FAMILY MEDICINE

## 2019-03-11 RX ORDER — ALBUTEROL SULFATE 90 UG/1
AEROSOL, METERED RESPIRATORY (INHALATION)
Refills: 1 | COMMUNITY
Start: 2019-01-08 | End: 2023-03-01

## 2019-03-11 ASSESSMENT — ENCOUNTER SYMPTOMS
HEARTBURN: 0
HEMATOCHEZIA: 0
BREAST MASS: 0
JOINT SWELLING: 0
DIZZINESS: 0
PALPITATIONS: 0
PARESTHESIAS: 0
ABDOMINAL PAIN: 0
CHILLS: 0
DIARRHEA: 0
DYSURIA: 0
NAUSEA: 0
HEADACHES: 0
ARTHRALGIAS: 0
WEAKNESS: 0
CONSTIPATION: 0
FEVER: 0
SORE THROAT: 0
HEMATURIA: 0
COUGH: 0
FREQUENCY: 0
MYALGIAS: 1
EYE PAIN: 0
SHORTNESS OF BREATH: 0
NERVOUS/ANXIOUS: 1

## 2019-03-11 NOTE — PROGRESS NOTES
SUBJECTIVE:   CC: Prasanth Coy is an 43 year old woman who presents for preventive health visit.     Physical   Annual:     Getting at least 3 servings of Calcium per day:  Yes    Bi-annual eye exam:  Yes    Dental care twice a year:  Yes    Sleep apnea or symptoms of sleep apnea:  None    Diet:  Regular (no restrictions)    Frequency of exercise:  None    Taking medications regularly:  No    Barriers to taking medications:  Problems remembering to take them    Medication side effects:  Other    Additional concerns today:  No    PHQ-2 Total Score: 0            Today's PHQ-2 Score:   PHQ-2 ( 1999 Pfizer) 3/11/2019   Q1: Little interest or pleasure in doing things 0   Q2: Feeling down, depressed or hopeless 0   PHQ-2 Score 0   Q1: Little interest or pleasure in doing things Not at all   Q2: Feeling down, depressed or hopeless Not at all   PHQ-2 Score 0       Abuse: Current or Past(Physical, Sexual or Emotional)- No  Do you feel safe in your environment? Yes    Social History     Tobacco Use     Smoking status: Never Smoker     Smokeless tobacco: Never Used   Substance Use Topics     Alcohol use: Yes     Comment: occasionally     Alcohol Use 3/11/2019   If you drink alcohol do you typically have greater than 3 drinks per day OR greater than 7 drinks per week? No   No flowsheet data found.    Reviewed orders with patient.  Reviewed health maintenance and updated orders accordingly - Yes  Labs reviewed in [facility]    Mammogram Screening: Patient under age 50, mutual decision reflected in health maintenance.      Pertinent mammograms are reviewed under the imaging tab.  History of abnormal Pap smear: Unsure. No pap for over 10 yrs.      Reviewed and updated as needed this visit by clinical staff  Tobacco  Allergies  Meds  Med Hx  Surg Hx  Fam Hx  Soc Hx        Reviewed and updated as needed this visit by Provider            Review of Systems   Constitutional: Negative for chills and fever.   HENT: Positive for  congestion. Negative for ear pain, hearing loss and sore throat.    Eyes: Positive for visual disturbance. Negative for pain.   Respiratory: Negative for cough and shortness of breath.    Cardiovascular: Negative for chest pain, palpitations and peripheral edema.   Gastrointestinal: Negative for abdominal pain, constipation, diarrhea, heartburn, hematochezia and nausea.   Breasts:  Positive for tenderness. Negative for breast mass and discharge.   Genitourinary: Negative for dysuria, frequency, genital sores, hematuria, pelvic pain, urgency, vaginal bleeding and vaginal discharge.   Musculoskeletal: Positive for myalgias. Negative for arthralgias and joint swelling.   Skin: Negative for rash.   Neurological: Negative for dizziness, weakness, headaches and paresthesias.   Psychiatric/Behavioral: Negative for mood changes. The patient is nervous/anxious.           OBJECTIVE:   /79 (BP Location: Right arm, Patient Position: Chair, Cuff Size: Adult Regular)   Pulse 98   Temp 98.1  F (36.7  C) (Oral)   Resp 16   Wt 72.6 kg (160 lb)   LMP 02/12/2019   BMI 25.82 kg/m    Physical Exam  GENERAL: healthy, alert and no distress  EYES: Eyes grossly normal to inspection, PERRL and conjunctivae and sclerae normal  HENT: ear canals and TM's normal, nose and mouth without ulcers or lesions  NECK: no adenopathy, no asymmetry, masses, or scars and thyroid normal to palpation  RESP: lungs clear to auscultation - no rales, rhonchi or wheezes  BREAST: normal without masses, tenderness or nipple discharge and no palpable axillary masses or adenopathy  CV: regular rate and rhythm, normal S1 S2  ABDOMEN: soft, nontender, and bowel sounds normal   (female): normal female external genitalia, normal urethral meatus, vaginal mucosa pink, moist, well rugated, and normal cervix/adnexa/uterus without masses or discharge  MS: no gross musculoskeletal defects noted, no edema  SKIN: diffuse dry skin with some scaling   NEURO: Normal  "strength and tone, mentation intact and speech normal  PSYCH: mentation appears normal, affect normal/bright    Diagnostic Test Results:  none     ASSESSMENT/PLAN:   1. Routine general medical examination at a health care facility  - pap done today. Patient not interested in labs today.   - Pap imaged thin layer screen with HPV - recommended age 30 - 65 years (select HPV order below)  - HPV High Risk Types DNA Cervical    COUNSELING:  Reviewed preventive health counseling, as reflected in patient instructions       Regular exercise       Healthy diet/nutrition    BP Readings from Last 1 Encounters:   03/11/19 127/79     Estimated body mass index is 25.82 kg/m  as calculated from the following:    Height as of 1/8/19: 1.676 m (5' 6\").    Weight as of this encounter: 72.6 kg (160 lb).    BP Screening:   Last 3 BP Readings:    BP Readings from Last 3 Encounters:   03/11/19 127/79   01/08/19 116/78   08/08/18 113/74       The following was recommended to the patient:  Re-screen BP within a year and recommended lifestyle modifications  Weight management plan: Discussed healthy diet and exercise guidelines     reports that  has never smoked. she has never used smokeless tobacco.      Counseling Resources:  ATP IV Guidelines  Pooled Cohorts Equation Calculator  Breast Cancer Risk Calculator  FRAX Risk Assessment  ICSI Preventive Guidelines  Dietary Guidelines for Americans, 2010  USDA's MyPlate  ASA Prophylaxis  Lung CA Screening    Tsering Lagos MD  [facility]  "

## 2019-03-11 NOTE — PATIENT INSTRUCTIONS
Patient Education       Prevention Guidelines, Women Ages 40 to 49  Screening tests and vaccines are an important part of managing your health. A screening test is done to find possible disorders or diseases in people who don't have any symptoms. The goal is to find a disease early so lifestyle changes can be made and you can be watched more closely to reduce the risk of disease, or to detect it early enough to treat it most effectively. Screening tests are not considered diagnostic, but are used to determine if more testing is needed. Health counseling is essential, too. Below are guidelines for these, for women ages 40 to 49. Talk with your healthcare provider to make sure you re up-to-date on what you need.  Screening Who needs it How often   Type 2 diabetes or prediabetes All women beginning at age 45 and women without symptoms at any age who are overweight or obese and have 1 or more additional risk factors for diabetes At least every 3 years1   Type 2 diabetes or prediabetes All women diagnosed with gestational diabetes Lifelong testing every 3 years   Type 2 diabetes All women with prediabetes Every year   Alcohol misuse All women in this age group At routine exams   Blood pressure All women in this age group Yearly checkup if your blood pressure is normal  Normal blood pressure is less than 120/80 mm Hg  If your blood pressure reading is higher than normal, follow the advice of your healthcare provider   Breast cancer All women at average risk in this age group Screening with a mammogram can start at age 40.2 Talk with your healthcare provider to help you decide when to start screening. At age 45 start yearly mammograms.3    Cervical cancer All women in this age group, except women who have had a complete hysterectomy Pap test every 3 years or Pap test plus human papilloma virus (HPV) test every 5 years   Chlamydia Women at increased risk for infection At routine exams if you're at risk or have symptoms    Depression All women in this age group At routine exams   Gonorrhea Sexually active women at increased risk for infection At routine exams   Hepatitis C Anyone at increased risk; 1 time for those born between 1945 and 1965 At routine exams   High cholesterol or triglycerides All women ages 45 and older who are at risk for coronary artery disease; younger women, talk with your healthcare provider At least every 5 years   HIV All women At routine exams. Those with risk factors for HIV should be tested at least annually.   Obesity All women in this age group At routine exams   Syphilis Women at increased risk for infection-talk with your healthcare provider At routine exams   Tuberculosis Women at increased risk for infection-talk with your healthcare provider Ask your healthcare provider   Vision All women in this age group Complete exam at age 40 and eye exams every 2 to 4 years. If you have a chronic disease, ask your healthcare provider how often you should have your eyes examined.4   Vaccine Who needs it How often   Chickenpox (varicella) All women in this age group who have no record of this infection or vaccine 2 doses; the second dose should be given at least 4 weeks after the first dose   Hepatitis A Women at increased risk for infection-talk with your healthcare provider 2 doses given 6 months apart   Hepatitis B Women at increased risk for infection-talk with your healthcare provider 3 doses over 6 months; second dose should be given 1 month after the first dose; the third dose should be given at least 2 months after the second dose and at least 4 months after the first dose   Haemophilus influenzae Type B (HIB) Women at increased risk 1 to 3 doses   Influenza (flu) All women in this age group Once a year   Measles, mumps, rubella (MMR) All women in this age group who have no record of these infections or vaccines 1 or 2 doses   Meningococcal Women at increased risk for infection-talk with your healthcare  provider 1 or more doses   Pneumococcal conjugate vaccine (PCV13) and pneumococcal polysaccharide vaccine (PPSV23) Women at increased risk for infection-talk with your healthcare provider 1 or 2 doses   Tetanus/diphtheria/pertussis (Td/Tdap) booster All women in this age group A one-time dose of Tdap instead of a Td booster after age 18, then Td every 10 years   Counseling Who needs it How often   BRCA gene mutation testing for breast and ovarian cancer susceptibility Women with increased risk for having gene mutation When your risk is known   Breast cancer and chemoprevention Women at high risk for breast cancer When your risk is known   Diet and exercise Women who are overweight or obese When diagnosed, and then at routine exams   Domestic violence Women at the age in which they are able to have children At routine exams   Sexually transmitted infection prevention Women at increased risk for infection-talk with your healthcare provider At routine exams   Use of tobacco and the health effects it can cause All women in this age group Every exam   1American Diabetes Association  2American College of Obstetricians and Gynecologists   3American Cancer Society  4American Academy of Ophthalmology  Date Last Reviewed: 11/1/2017 2000-2018 [employer]. [address] [place], [place] [id number]. All rights reserved. This information is not intended as a substitute for professional medical care. Always follow your healthcare professional's instructions.

## 2019-03-12 ASSESSMENT — ANXIETY QUESTIONNAIRES
6. BECOMING EASILY ANNOYED OR IRRITABLE: NOT AT ALL
3. WORRYING TOO MUCH ABOUT DIFFERENT THINGS: SEVERAL DAYS
2. NOT BEING ABLE TO STOP OR CONTROL WORRYING: SEVERAL DAYS
7. FEELING AFRAID AS IF SOMETHING AWFUL MIGHT HAPPEN: SEVERAL DAYS
1. FEELING NERVOUS, ANXIOUS, OR ON EDGE: SEVERAL DAYS
IF YOU CHECKED OFF ANY PROBLEMS ON THIS QUESTIONNAIRE, HOW DIFFICULT HAVE THESE PROBLEMS MADE IT FOR YOU TO DO YOUR WORK, TAKE CARE OF THINGS AT HOME, OR GET ALONG WITH OTHER PEOPLE: SOMEWHAT DIFFICULT
GAD7 TOTAL SCORE: 7
5. BEING SO RESTLESS THAT IT IS HARD TO SIT STILL: SEVERAL DAYS

## 2019-03-12 ASSESSMENT — PATIENT HEALTH QUESTIONNAIRE - PHQ9
5. POOR APPETITE OR OVEREATING: MORE THAN HALF THE DAYS
SUM OF ALL RESPONSES TO PHQ QUESTIONS 1-9: 4

## 2019-03-13 ENCOUNTER — ANCILLARY PROCEDURE (OUTPATIENT)
Dept: MAMMOGRAPHY | Facility: CLINIC | Age: 44
End: 2019-03-13
Payer: COMMERCIAL

## 2019-03-13 DIAGNOSIS — Z12.31 VISIT FOR SCREENING MAMMOGRAM: ICD-10-CM

## 2019-03-13 PROCEDURE — 77067 SCR MAMMO BI INCL CAD: CPT | Mod: TC

## 2019-03-13 ASSESSMENT — ANXIETY QUESTIONNAIRES: GAD7 TOTAL SCORE: 7

## 2019-03-14 LAB
COPATH REPORT: NORMAL
PAP: NORMAL

## 2019-03-15 LAB
FINAL DIAGNOSIS: NORMAL
HPV HR 12 DNA CVX QL NAA+PROBE: NEGATIVE
HPV16 DNA SPEC QL NAA+PROBE: NEGATIVE
HPV18 DNA SPEC QL NAA+PROBE: NEGATIVE
SPECIMEN DESCRIPTION: NORMAL
SPECIMEN SOURCE CVX/VAG CYTO: NORMAL

## 2019-04-12 ENCOUNTER — HOSPITAL ENCOUNTER (EMERGENCY)
Facility: CLINIC | Age: 44
Discharge: HOME | End: 2019-04-12
Attending: EMERGENCY MEDICINE | Admitting: EMERGENCY MEDICINE
Payer: COMMERCIAL

## 2019-04-12 ENCOUNTER — HOSPITAL ENCOUNTER (INPATIENT)
Facility: CLINIC | Age: 44
LOS: 6 days | Discharge: HOME | End: 2019-04-19
Attending: PSYCHIATRY & NEUROLOGY | Admitting: PSYCHIATRY & NEUROLOGY
Payer: COMMERCIAL

## 2019-04-12 ENCOUNTER — OFFICE VISIT (OUTPATIENT)
Dept: URGENT CARE | Facility: URGENT CARE | Age: 44
End: 2019-04-12
Payer: COMMERCIAL

## 2019-04-12 VITALS
SYSTOLIC BLOOD PRESSURE: 126 MMHG | DIASTOLIC BLOOD PRESSURE: 78 MMHG | BODY MASS INDEX: 25.82 KG/M2 | HEART RATE: 86 BPM | RESPIRATION RATE: 18 BRPM | OXYGEN SATURATION: 97 % | TEMPERATURE: 98 F | WEIGHT: 160 LBS

## 2019-04-12 VITALS
DIASTOLIC BLOOD PRESSURE: 90 MMHG | OXYGEN SATURATION: 100 % | HEART RATE: 90 BPM | RESPIRATION RATE: 16 BRPM | SYSTOLIC BLOOD PRESSURE: 130 MMHG | TEMPERATURE: 98.2 F

## 2019-04-12 DIAGNOSIS — F41.9 ANXIETY: ICD-10-CM

## 2019-04-12 DIAGNOSIS — F32.3 MAJOR DEPRESSIVE DISORDER WITH PSYCHOTIC FEATURES (H): Primary | ICD-10-CM

## 2019-04-12 DIAGNOSIS — R44.3 HALLUCINATIONS: ICD-10-CM

## 2019-04-12 DIAGNOSIS — F33.3 DEPRESSION, MAJOR, RECURRENT, SEVERE WITH PSYCHOSIS (H): ICD-10-CM

## 2019-04-12 DIAGNOSIS — F22 PARANOIA (H): ICD-10-CM

## 2019-04-12 LAB
AMPHETAMINES UR QL SCN: NEGATIVE
BARBITURATES UR QL: NEGATIVE
BENZODIAZ UR QL: NEGATIVE
CANNABINOIDS UR QL SCN: NEGATIVE
COCAINE UR QL: NEGATIVE
ETHANOL UR QL SCN: NEGATIVE
HCG UR QL: NEGATIVE
OPIATES UR QL SCN: NEGATIVE

## 2019-04-12 PROCEDURE — 99207 ZZC OFFICE-HOSPITAL ADMIT: CPT | Performed by: PHYSICIAN ASSISTANT

## 2019-04-12 PROCEDURE — 81025 URINE PREGNANCY TEST: CPT | Performed by: PSYCHIATRY & NEUROLOGY

## 2019-04-12 PROCEDURE — 25000132 ZZH RX MED GY IP 250 OP 250 PS 637: Performed by: PSYCHIATRY & NEUROLOGY

## 2019-04-12 PROCEDURE — 99283 EMERGENCY DEPT VISIT LOW MDM: CPT

## 2019-04-12 PROCEDURE — 80320 DRUG SCREEN QUANTALCOHOLS: CPT | Performed by: PSYCHIATRY & NEUROLOGY

## 2019-04-12 PROCEDURE — 99285 EMERGENCY DEPT VISIT HI MDM: CPT | Mod: 25 | Performed by: PSYCHIATRY & NEUROLOGY

## 2019-04-12 PROCEDURE — 99285 EMERGENCY DEPT VISIT HI MDM: CPT | Mod: Z6 | Performed by: PSYCHIATRY & NEUROLOGY

## 2019-04-12 PROCEDURE — 80307 DRUG TEST PRSMV CHEM ANLYZR: CPT | Performed by: PSYCHIATRY & NEUROLOGY

## 2019-04-12 PROCEDURE — 90791 PSYCH DIAGNOSTIC EVALUATION: CPT

## 2019-04-12 RX ORDER — ESCITALOPRAM OXALATE 5 MG/1
TABLET ORAL
Qty: 30 TABLET | Refills: 0 | Status: SHIPPED | OUTPATIENT
Start: 2019-04-12 | End: 2019-04-12

## 2019-04-12 RX ORDER — OLANZAPINE 5 MG/1
5 TABLET, ORALLY DISINTEGRATING ORAL ONCE
Status: COMPLETED | OUTPATIENT
Start: 2019-04-12 | End: 2019-04-12

## 2019-04-12 RX ORDER — ESCITALOPRAM OXALATE 5 MG/1
5 TABLET ORAL DAILY
Status: ON HOLD | COMMUNITY
End: 2019-04-16

## 2019-04-12 RX ORDER — HYDROXYZINE HYDROCHLORIDE 25 MG/1
25 TABLET, FILM COATED ORAL 3 TIMES DAILY PRN
Qty: 30 TABLET | Refills: 0 | Status: SHIPPED | OUTPATIENT
Start: 2019-04-12 | End: 2019-04-12

## 2019-04-12 RX ADMIN — OLANZAPINE 5 MG: 5 TABLET, ORALLY DISINTEGRATING ORAL at 20:09

## 2019-04-12 ASSESSMENT — ENCOUNTER SYMPTOMS
BACK PAIN: 0
FEVER: 0
HALLUCINATIONS: 1
DIZZINESS: 0
CONFUSION: 1
SHORTNESS OF BREATH: 0
ABDOMINAL PAIN: 0
NERVOUS/ANXIOUS: 1
CHEST TIGHTNESS: 0
DYSPHORIC MOOD: 1
NERVOUS/ANXIOUS: 1

## 2019-04-12 NOTE — PROGRESS NOTES
SUBJECTIVE:   Prasanth Coy is a 44 year old female presenting with a chief complaint of patient is crying, discombobulated and so sad she cant even talk or reason at times.  She is with her brother, who states that she is not functioning well and is concerned to leave her at home because of her kids.  Course of illness is worsening.    Severity moderately severe  Current and Associated symptoms: depression, anxiety, paranoia and hallucinations  Treatment measures tried include has taken lexapro and .  Predisposing factors include hx of mental illness.    Past Medical History:   Diagnosis Date     Anxiety      Depressive disorder      Uncomplicated asthma      ALLERGIES   No Known Allergies      Social History     Tobacco Use     Smoking status: Never Smoker     Smokeless tobacco: Never Used   Substance Use Topics     Alcohol use: Yes     Comment: occasionally     Family History   Problem Relation Age of Onset     Depression Mother      Depression Sister        ROS:  CONSTITUTIONAL:POSITIVE  for anxious and crying  INTEGUMENTARY/SKIN: NEGATIVE for worrisome rashes, moles or lesions  EYES: POSITIVE for tearful  ENT/MOUTH: NEGATIVE for ear, mouth and throat problems  RESP:NEGATIVE for significant cough or SOB  CV: NEGATIVE for chest pain, palpitations or peripheral edema  GI: NEGATIVE for nausea, abdominal pain, heartburn, or change in bowel habits  : normal menstrual cycles  MUSCULOSKELETAL: NEGATIVE for significant arthralgias or myalgia  NEURO: POSITIVE for dizziness/lightheadedness  PSYCHIATRIC: POSITIVE foranxiety, concentration difficulty, depressed mood, Hx anxiety, Hx depression, fatigue, hallucinations, hopelessness     OBJECTIVE  :/78   Pulse 86   Temp 98  F (36.7  C) (Oral)   Resp 18   Wt 72.6 kg (160 lb)   SpO2 97%   BMI 25.82 kg/m    GENERAL APPEARANCE: healthy, alert and no distress  EYES: PERRLA, EOMI  HENT: ear canals and TM's normal.  Nose and mouth without ulcers, erythema or  lesions  NECK: supple, nontender, no lymphadenopathy  RESP: lungs clear to auscultation - no rales, rhonchi or wheezes  CV: regular rates and rhythm, normal S1 S2, no murmur noted  ABDOMEN:  soft, nontender, no HSM or masses and bowel sounds normal  Extremities: no peripheral edema or tenderness, peripheral pulses normal  MS: extremities normal- no gross deformities noted, no erythema, FROM noted in all extremities  NEURO: positive for lightheadedness  SKIN: no suspicious lesions or rashes  PSYCH: anxious, concentration poor, confused, crying, disoriented, fatigued, judgment and insight intact and worried    ASSESSMENT/PLAN      ICD-10-CM    1. Major depressive disorder with psychotic features (H) F32.3 escitalopram (LEXAPRO) 5 MG tablet   2. Anxiety F41.9 hydrOXYzine (ATARAX) 25 MG tablet   3. Paranoia (H) F22    4. Hallucinations R44.3          Patient is in my opinion unfit to be discharged home alone.  She is not suicidal or has thoughts of self harm today but she is not functioning well and I do not feel she could manage her kids at this time  Her brother agrees, they are going to go back to [facility] for evaluation, as she has been admitted to that clinic before.  He will drive her their and they are both In agreement to go     We have set her up an appt with her PCP for next week for follow up

## 2019-04-12 NOTE — ED TRIAGE NOTES
"\"Anxious and stressed out\".  Hasn't taken her anxiety meds for over 1 year.    Pt A&O x 3, CMS x 3, ABCD's adequate in triage    "

## 2019-04-12 NOTE — ED NOTES
Pt reports she is stressed and did not eat today. Denies suicidal thoughts. She states she was on meds for the same but stoped taking them.      Pt has 2 prescription bottles  Of  escitalopram 10mg 1 TAB DAILY  And risperidone0.25mg 1 TAB BID

## 2019-04-12 NOTE — ED AVS SNAPSHOT
[facility]  [address]  [facility] [address]  Phone:  [phone]  Fax:  [phone]                                    Prasanth Coy   MRN: [id number]    Department:  [facility]   Date of Visit:  4/12/2019           After Visit Summary Signature Page    I have received my discharge instructions, and my questions have been answered. I have discussed any challenges I see with this plan with the nurse or doctor.    ..........................................................................................................................................  Patient/Patient Representative Signature      ..........................................................................................................................................  Patient Representative Print Name and Relationship to Patient    ..................................................               ................................................  Date                                   Time    ..........................................................................................................................................  Reviewed by Signature/Title    ...................................................              ..............................................  Date                                               Time          22EPIC Rev 08/18

## 2019-04-12 NOTE — ED PROVIDER NOTES
"  History     Chief Complaint:  Anxiety     HPI   Prasanth Coy is a 44 year old female with a history of anxiety and depression who presents for evaluation of anxiety. She reports that she has been experiencing a lot more stress and anxiety, especially in relation to the relationships she has with her boyfriend, daughter, and other people. She reports that a lot of this stress comes from perceiving those around her are judging her choices and actions, despite her \"not trying to ruin anyone's life;\" all she wants to do is make people happy, however she believes herself to be failing at that. She does report recently stopping her anxiety medications, listed below, because she \"thought she felt better.\" She started taking them again today because she started feeling more nauseous and anxious, so she took one of them and now is concerned the tablets were . She reports that she has seen a therapist in the past, but does not think they were much help; she is not currently interested in restarted therapy because she \"wants to be normal like everyone else.\" Of note, she does contract for safety at home and denies any suicidal ideations.     Allergies:  NKDA    Medications:    Lexapro  Risperidone  Ventolin inhaler    Past Medical History:    Anxiety & Depression  Asthma     Past Surgical History:      Orthopedic surgery    Family History:    Depression    Social History:  Marital Status:  Single [1]  Negative for tobacco use.  Positive for alcohol use.      Review of Systems   Psychiatric/Behavioral: Negative for suicidal ideas. The patient is nervous/anxious.    All other systems reviewed and are negative.      Physical Exam     Patient Vitals for the past 24 hrs:   BP Temp Temp src Pulse Resp SpO2   19 0216 130/90 98.2  F (36.8  C) Temporal 90 16 100 %     Physical Exam  Gen:  Pleasant, appears stated age.    Eye:   Sclera non-injected.      Musculoskeletal:     Normal movement of all extremities " without evidence for deficit.    Extremities:    No edema.  Atraumatic.      Skin:   Warm and dry.  No rash.    Neurologic:    Non-focal exam without asymmetric weakness or numbness.    Fluent speech.    Psych:    Appearance: awake, alert, adequately groomed, dressed in street clothes and appeared as age stated    Attitude: cooperative    Eye Contact: fair    Mood: sad, tearful   Affect: appropriate and in normal range and mood congruent    Speech: clear, coherent    Psychomotor Behavior: no evidence of tardive dyskinesia, dystonia, or tics    Thought Process: ruminating on relationship with boyfriend and whether she is good for him    Associations: no loose associations    Thought Content: no evidence of suicidal ideation or homicidal ideation, no auditory or hallucinations present    Insight: fair    Judgment: fair      Emergency Department Course     Emergency Department Course:  Nursing notes and vitals reviewed.   (0308) I performed an exam of the patient as documented above.     Findings and plan explained to the Patient. Patient discharged home with instructions regarding supportive care, medications, and reasons to return. The importance of close follow-up was reviewed. I provided a Mental health referral for the patient to follow-up on as an outpatient.      I personally answered all related questions prior to discharge.        Impression & Plan      Medical Decision Making:  Prasanth Coy is a 44 year old female with a history of anxiety and depression who presents with concerns for multiple personal stresses. The patient is tearful and anxious, but is not suicidal nor does she appear to be intoxicated, or psychotic. She has been off her medications for an unclear amount of time and has not been seeing her therapist regularly. She is concerned that she is putting her relationships at risk because she is unable to provide others the care that they need because of her mental health issues. At this point, I do  not think she is a risk to herself. I have placed a mental health referral to help get her back into therapy to discuss methods for coping with the stresses in her life. I have referred her back to a PCP or psychiatrist to restart her medications. At this point, I do not think she needs inpatient hospitalization or behavioral health evaluation. The patient agrees with this plan.       Diagnosis:    ICD-10-CM    1. Anxiety F41.9 MENTAL HEALTH REFERRAL  - Adult; Outpatient Treatment; Individual/Couples/Family/Group Therapy/Health Psychology; Other: Behavioral Healthcare Providers [phone]; We will contact you to schedule the appointment or please call with any questi...       Disposition:  discharged to home    Discharge Medications:  There were no discharge medications.     Scribe Disclosure:  I, Claudette Zurita, am serving as a scribe on 4/12/2019 at 3:08 AM to personally document services performed by Marisela Gao MD based on my observations and the provider's statements to me.     Claudette Zurita  4/12/2019   [facility] EMERGENCY DEPARTMENT       Marisela Gao MD  04/12/19 0553

## 2019-04-13 PROBLEM — F32.A DEPRESSION: Status: ACTIVE | Noted: 2019-04-13

## 2019-04-13 PROCEDURE — 99222 1ST HOSP IP/OBS MODERATE 55: CPT | Mod: AI | Performed by: PSYCHIATRY & NEUROLOGY

## 2019-04-13 PROCEDURE — 99207 ZZC CDG-MDM COMPONENT: MEETS MODERATE - UP CODED: CPT | Performed by: PSYCHIATRY & NEUROLOGY

## 2019-04-13 PROCEDURE — 12400001 ZZH R&B MH UMMC

## 2019-04-13 PROCEDURE — 25000132 ZZH RX MED GY IP 250 OP 250 PS 637: Performed by: PSYCHIATRY & NEUROLOGY

## 2019-04-13 RX ORDER — HYDROXYZINE HYDROCHLORIDE 25 MG/1
25 TABLET, FILM COATED ORAL EVERY 4 HOURS PRN
Status: DISCONTINUED | OUTPATIENT
Start: 2019-04-13 | End: 2019-04-19 | Stop reason: HOSPADM

## 2019-04-13 RX ORDER — ACETAMINOPHEN 325 MG/1
650 TABLET ORAL EVERY 4 HOURS PRN
Status: DISCONTINUED | OUTPATIENT
Start: 2019-04-13 | End: 2019-04-19 | Stop reason: HOSPADM

## 2019-04-13 RX ORDER — RISPERIDONE 0.5 MG/1
0.5 TABLET ORAL 2 TIMES DAILY
Status: DISCONTINUED | OUTPATIENT
Start: 2019-04-13 | End: 2019-04-19 | Stop reason: HOSPADM

## 2019-04-13 RX ORDER — ALBUTEROL SULFATE 90 UG/1
2 AEROSOL, METERED RESPIRATORY (INHALATION) EVERY 6 HOURS PRN
Status: DISCONTINUED | OUTPATIENT
Start: 2019-04-13 | End: 2019-04-19 | Stop reason: HOSPADM

## 2019-04-13 RX ORDER — TRAZODONE HYDROCHLORIDE 50 MG/1
50 TABLET, FILM COATED ORAL
Status: DISCONTINUED | OUTPATIENT
Start: 2019-04-13 | End: 2019-04-19 | Stop reason: HOSPADM

## 2019-04-13 RX ORDER — FLUTICASONE PROPIONATE 50 MCG
1-2 SPRAY, SUSPENSION (ML) NASAL DAILY
Status: DISCONTINUED | OUTPATIENT
Start: 2019-04-13 | End: 2019-04-19 | Stop reason: HOSPADM

## 2019-04-13 RX ORDER — ALUMINA, MAGNESIA, AND SIMETHICONE 2400; 2400; 240 MG/30ML; MG/30ML; MG/30ML
30 SUSPENSION ORAL EVERY 4 HOURS PRN
Status: DISCONTINUED | OUTPATIENT
Start: 2019-04-13 | End: 2019-04-19 | Stop reason: HOSPADM

## 2019-04-13 RX ORDER — BISACODYL 10 MG
10 SUPPOSITORY, RECTAL RECTAL DAILY PRN
Status: DISCONTINUED | OUTPATIENT
Start: 2019-04-13 | End: 2019-04-19 | Stop reason: HOSPADM

## 2019-04-13 RX ORDER — ESCITALOPRAM OXALATE 5 MG/1
5 TABLET ORAL DAILY
Status: DISCONTINUED | OUTPATIENT
Start: 2019-04-13 | End: 2019-04-13

## 2019-04-13 RX ORDER — MIRTAZAPINE 15 MG/1
15 TABLET, FILM COATED ORAL AT BEDTIME
Status: DISCONTINUED | OUTPATIENT
Start: 2019-04-13 | End: 2019-04-19 | Stop reason: HOSPADM

## 2019-04-13 RX ADMIN — HYDROXYZINE HYDROCHLORIDE 25 MG: 25 TABLET ORAL at 18:41

## 2019-04-13 RX ADMIN — ESCITALOPRAM 5 MG: 5 TABLET, FILM COATED ORAL at 08:43

## 2019-04-13 RX ADMIN — RISPERIDONE 0.5 MG: 0.5 TABLET ORAL at 20:20

## 2019-04-13 RX ADMIN — RISPERIDONE 0.5 MG: 0.5 TABLET ORAL at 11:41

## 2019-04-13 RX ADMIN — MIRTAZAPINE 15 MG: 15 TABLET, FILM COATED ORAL at 20:20

## 2019-04-13 RX ADMIN — ACETAMINOPHEN 650 MG: 325 TABLET, FILM COATED ORAL at 08:43

## 2019-04-13 ASSESSMENT — ACTIVITIES OF DAILY LIVING (ADL)
TRANSFERRING: 0-->INDEPENDENT
RETIRED_EATING: 0-->INDEPENDENT
ORAL_HYGIENE: INDEPENDENT
COGNITION: 0 - NO COGNITION ISSUES REPORTED
RETIRED_COMMUNICATION: 0-->UNDERSTANDS/COMMUNICATES WITHOUT DIFFICULTY
SWALLOWING: 0-->SWALLOWS FOODS/LIQUIDS WITHOUT DIFFICULTY
LAUNDRY: WITH SUPERVISION
BATHING: 0-->INDEPENDENT
AMBULATION: 0-->INDEPENDENT
DRESS: 0-->INDEPENDENT
HYGIENE/GROOMING: INDEPENDENT
LAUNDRY: WITH SUPERVISION
ORAL_HYGIENE: INDEPENDENT
TOILETING: 0-->INDEPENDENT
FALL_HISTORY_WITHIN_LAST_SIX_MONTHS: NO
HYGIENE/GROOMING: INDEPENDENT
DRESS: INDEPENDENT
DRESS: INDEPENDENT

## 2019-04-13 NOTE — ED NOTES
"ED to Behavioral Floor Handoff    SITUATION  Prasanth Coy is a 44 year old female who speaks English and lives in a home with family members The patient arrived in the ED by private care from clinic with a complaint of Hallucinations (per EMS, brother called 911 due to pt having auditory and visual hallucinations; denies hearing voices now; last week was having SI but not now; boyfriend told brother she as not making sense and needed help; pt not sleeping very well; denies command hallucinations; \"I feel terrible about myself and think I was bad to others\"; denies HI; )  .The patient's current symptoms started/worsened 1 year ago and during this time the symptoms have remained the same.   In the ED, pt was diagnosed with   Final diagnoses:   Depression, major, recurrent, severe with psychosis (H)        Initial vitals were: BP: (!) 145/92  Pulse: 82  Temp: 98.9  F (37.2  C)  Resp: 16  Weight: 77.1 kg (170 lb)  SpO2: 98 %   --------  Is the patient diabetic? No   If yes, last blood glucose? --     If yes, was this treated in the ED? --  --------  Is the patient inebriated (ETOH) No or Impaired on other substances? No  MSSA done? No  Last MSSA score: --    Were withdrawal symptoms treated? No  Does the patient have a seizure history? No. If yes, date of most recent seizure--  --------  Is the patient patient experiencing suicidal ideation? denies current or recent suicidal ideation     Homicidal ideation? denies current or recent homicidal ideation or behaviors.    Self-injurious behavior/urges? denies current or recent self injurious behavior or ideation.  ------  Was pt aggressive in the ED No  Was a code called No  Is the pt now cooperative? No  -------  Meds given in ED:   Medications   OLANZapine zydis (zyPREXA) ODT tab 5 mg (5 mg Oral Given 4/12/19 2009)      Family present during ED course? No  Family currently present? No    BACKGROUND  Does the patient have a cognitive impairment or developmental disability? " No  Allergies:   Allergies   Allergen Reactions     Nuts      Tight throat     Shrimp      Tight throat   .   Social demographics are   Social History     Socioeconomic History     Marital status: Single     Spouse name: None     Number of children: None     Years of education: None     Highest education level: None   Occupational History     None   Social Needs     Financial resource strain: None     Food insecurity:     Worry: None     Inability: None     Transportation needs:     Medical: None     Non-medical: None   Tobacco Use     Smoking status: Never Smoker     Smokeless tobacco: Never Used   Substance and Sexual Activity     Alcohol use: Yes     Comment: occasionally     Drug use: No     Sexual activity: Yes     Partners: Male     Birth control/protection: None   Lifestyle     Physical activity:     Days per week: None     Minutes per session: None     Stress: None   Relationships     Social connections:     Talks on phone: None     Gets together: None     Attends Lutheran service: None     Active member of club or organization: None     Attends meetings of clubs or organizations: None     Relationship status: None     Intimate partner violence:     Fear of current or ex partner: None     Emotionally abused: None     Physically abused: None     Forced sexual activity: None   Other Topics Concern     Parent/sibling w/ CABG, MI or angioplasty before 65F 55M? Not Asked   Social History Narrative     None        ASSESSMENT  Labs results   Labs Ordered and Resulted from Time of ED Arrival Up to the Time of Departure from the ED   HCG QUALITATIVE URINE   DRUG ABUSE SCREEN 6 CHEM DEP URINE ([facility])      Imaging Studies: No results found for this or any previous visit (from the past 24 hour(s)).   Most recent vital signs BP (!) 145/92   Pulse 82   Temp 98.9  F (37.2  C) (Oral)   Resp 16   Wt 77.1 kg (170 lb)   LMP 03/10/2019 (Approximate)   SpO2 98%   BMI 27.44 kg/m     Abnormal labs/tests/findings requiring  intervention:---   Pain control: pt had none  Nausea control: pt had none    RECOMMENDATION  Are any infection precautions needed (MRSA, VRE, etc.)? No If yes, what infection? --  ---  Does the patient have mobility issues? independently. If yes, what device does the pt use? ---  ---  Is patient on 72 hour hold or commitment? No If on 72 hour hold, have hold and rights been given to patient? No  Are admitting orders written if after 10 p.m. ?No  Tasks needing to be completed:---     [place]   [facility]--    [phone] [facility]   [phone] [facility]

## 2019-04-13 NOTE — PROGRESS NOTES
"Pt was visible in milieu but remained withdrawn periodically throughout day shift. Upon check in w/writer pt was tearful stating she was worried about her daughter. Pt reports experiencing depression, stating she just felt \"very\" depressed. PT attributes this to her situation and being away from her daughter. Pt rates anxiety very high as well, attributing it to the same environmental factors. Pt denies SI and SIB. Pt denies psychotic symptoms. Pt reports eating and sleeping appropriately. Pt exhibits poor insight and judgement into her situation. Pt's 16 year old daughter is staying with a family member and reported to be safe and functioning appropriately. Pt believes that her problems will be solved if she leaves the facility. Pt spent the afternoon sleeping in her room. Pt was cooperative with staff.      04/13/19 1400   Behavioral Health   Hallucinations denies / not responding to hallucinations   Thinking poor concentration;distractable;confused   Orientation person: oriented;place: oriented;date: oriented;time: oriented   Memory baseline memory   Insight denial of illness;poor   Judgement impaired   Eye Contact at examiner   Affect sad   Mood hopeless;depressed   Physical Appearance/Attire attire appropriate to age and situation   Hygiene well groomed   Suicidality   (Denies SI)   1. Wish to be Dead No   2. Non-Specific Active Suicidal Thoughts  No   Self Injury   (Denies SIB)   Elopement Statements about wanting to leave   Activity withdrawn;isolative  (visible in milieu, sleeping)   Speech clear;coherent   Medication Sensitivity no stated side effects;no observed side effects   Psychomotor / Gait balanced;steady   Activities of Daily Living   Hygiene/Grooming independent   Oral Hygiene independent   Dress independent   Laundry with supervision   Room Organization independent     "

## 2019-04-13 NOTE — PROGRESS NOTES
Initial Psychosocial Assessment    I have reviewed the chart, met with the patient, and developed Care Plan.  Information for assessment was obtained from patient and chart notes.     Presenting Problem:  Patient was admitted on a voluntary basis with depression and auditory hallucinations.  She was brought in by family due to worsening symptoms.  Patient reports that she thinks people think she is a bad person and she feels like a bad person. Patient very tearful during interview. She indicates that she stopped medications and stopped seeing her providers several months ago because she was doing well.      History of Mental Health and Chemical Dependency:  This is patient's second mental health admission.  First was in May 2018 at [facility], admitted with a similar presentation.  History MDD and XAVIER    Family Description (Constellation, Family Psychiatric History):  [place] was [place]  Brother, sister and daughter with depression.    Significant Life Events (Illness, Abuse, Trauma, Death):  None.    Living Situation:  With her 16 year old daughter and her boyfriend.    Educational Background:  Master's degree in psychology    Occupational History:  Patient has worked as an [employer] worker with [employer] in the past.  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      Financial Status:  Stable, boyfriend helps out.    Legal Issues:  None     Ethnic/Cultural Issues:  None noted    Spiritual Orientation:  Latter day      Service History:  None     Social Functioning (organization, interests):  Being with family is important to patient.      Current Treatment Providers are:  Patient previously has providers at [facility] in [place] but has not seen medication prescriber or therapist for quite a few months.      Social Service Assessment/Plan:  Patient has been seen by the on-call psychiatrist.  Medications have been started.  Patient is hoping  for a short hospital stay.  She misses her daughter.  She indicates her daughter may be staying with her brother at this time or at home with her boyfriend.  She remained very tearful and expressed some hopelessness during interview. She does indicate she wants to get her life back in order.  She misses the work she did as an [employer] worker.  Patient will meet with the treatment team on Monday to further coordinate plan of care.  CTC available to assist as needed and will ensure that appropriate aftercare is in place prior to discharge.

## 2019-04-13 NOTE — PHARMACY-ADMISSION MEDICATION HISTORY
Admission medication history for the April 12, 2019 admission is complete.     Interview sources:  Patient, Pharmacy ([facility]), [facility]    Reliability of source: Good    Medication compliance: Poor    Preferred Outpatient [facility]acy: [facility]    Changes made to PTA medication list (reason)  Added: escitalopram 5mg  Deleted: Hydroxyzine 25mg tablet; Take 1 tablet by mouth 3 times daily as needed. (Last fill in July 2018)  Risperidone ER 0.25mg tablet; Take 1 tablet by mouth twice daily. (Last fill in July 2018)  Changed: None    Additional medication history information:   Patient stated she has takes her medications somewhat regularly, however the last fill on all of her medications per [facility] and [facility] was in July 2018. Unclear if she has been taking any of her medications.  Patient has a 5mg escitalopram prescription that was filled at her pharmacy today which has not been picked up. She reports last taking it yesterday, however the most recent time she picked it up was July 2018.   The following medications were all last filled together in July 2018:   - Risperidone (directions above)   - Hydroxyzine (directions above)   - Mirtazapine 15mg tablet; Take 1/2 tablet by mouth at bedtime.   - Trazodone 50mg tablet; Take 1 tablet by mouth at bedtime.     Prior to Admission Medication List:  Prior to Admission medications    Medication Sig Last Dose Taking? Auth Provider   escitalopram (LEXAPRO) 5 MG tablet Take 5 mg by mouth daily Has not picked up  Unknown, Entered By History   fluticasone (FLONASE) 50 MCG/ACT nasal spray Spray 1-2 sprays into both nostrils daily Unknown at Unknown time  Aj Urbina PA-C   VENTOLIN  (90 Base) MCG/ACT inhaler INL 2 PFS ITL Q 6 H PRF SOB OR DIFFICULT BREATHING OR WHZ Unknown at Unknown  Reported, Patient       Time spent: 20 minutes    Medication history completed by:   Cyril Lua, Pharmacy Intern

## 2019-04-13 NOTE — PROGRESS NOTES
04/13/19 0042   Patient Belongings   Did you bring any home meds/supplements to the hospital?  No   Patient Belongings Put in Hospital Secure Location (Security or Locker, etc.) clothing;earrings   Belongings Search Yes   Clothing Search Yes   Second Staff Logan IVAN RN and Bhupendra PADRON   Comment Non sent to security     Items kept in storage  Jacket, Sweater, Pants , Earrings, Bra.    Non sent to security    No ID, No Credit Card, No Cash    Admission Signature    Patient Signature    ________________    Staff Signature    _______________    Discharge Signature    All my personal items were returned to me    Patient Signature    _______________    Staff Signature    ____________

## 2019-04-13 NOTE — ED PROVIDER NOTES
"  History     Chief Complaint   Patient presents with     Hallucinations     per EMS, brother called 911 due to pt having auditory and visual hallucinations; denies hearing voices now; last week was having SI but not now; boyfriend told brother she as not making sense and needed help; pt not sleeping very well; denies command hallucinations; \"I feel terrible about myself and think I was bad to others\"; denies HI;      The history is provided by the patient, medical records and a relative.     Prasanth Coy is a 44 year old female who comes in due to her worsening symptoms of depression and some hallucinations.  She is labile and struggling to control her emotions. She is guarded at times but then loose at others.  She talks about suicide but then retracts that.  She is tearful at times.  She is hearing voices and very paranoid about others.  She believes that the TV, radio and people talking are talking about her.  She was seen at [facility] ED late last night and then went to a therapy appointment that they set up for her.  While there, she was talking about being suicidal, was disorganized and the therapist sent her to the ED.  She has had a similar episode several years ago and was put on medications. She stopped them a year ago.  She is willing to come into the hospital and she wants to feel better.    Please see the 's assessment in [facility] from today (4/12/19)for further details.    I have reviewed the Medications, Allergies, Past Medical and Surgical History, and Social History in the Epic system.    Review of Systems   Constitutional: Negative for fever.   Eyes: Negative for visual disturbance.   Respiratory: Negative for chest tightness and shortness of breath.    Cardiovascular: Negative for chest pain.   Gastrointestinal: Negative for abdominal pain.   Musculoskeletal: Negative for back pain.   Neurological: Negative for dizziness.   Psychiatric/Behavioral: Positive for confusion, dysphoric mood and " hallucinations. Negative for self-injury and suicidal ideas. The patient is nervous/anxious.    All other systems reviewed and are negative.      Physical Exam   BP: (!) 145/92  Pulse: 82  Temp: 98.9  F (37.2  C)  Resp: 16  Weight: 77.1 kg (170 lb)  SpO2: 98 %      Physical Exam   Constitutional: She is oriented to person, place, and time. She appears well-developed and well-nourished.   Cardiovascular: Normal rate, regular rhythm and normal heart sounds.   Pulmonary/Chest: Effort normal and breath sounds normal. No respiratory distress.   Neurological: She is alert and oriented to person, place, and time.   Psychiatric: Her behavior is normal. Judgment normal. Her mood appears anxious. Her affect is labile. Her speech is delayed. She is actively hallucinating. Thought content is paranoid. Thought content is not delusional. Cognition and memory are normal. She exhibits a depressed mood. She expresses no homicidal and no suicidal ideation. She expresses no suicidal plans and no homicidal plans.   Prasanth is a 43 y/o female who looks her age.  She is well groomed with good eye contact. She is tearful at time.   Nursing note and vitals reviewed.      ED Course        Procedures               Labs Ordered and Resulted from Time of ED Arrival Up to the Time of Departure from the ED   HCG QUALITATIVE URINE   DRUG ABUSE SCREEN 6 CHEM DEP URINE ([facility])            Assessments & Plan (with Medical Decision Making)   Prasanth will be admitted to the hospital due to her worsening symptoms of depression, lability and hallucinations.  It is unclear if she has hallucinations.  She was given 5 mg of zyprexa to try to help her symptoms in the ED.  She will go to station 32 under Dr. Martinez.    I have reviewed the nursing notes.    I have reviewed the findings, diagnosis, plan and need for follow up with the patient.       Medication List      There are no discharge medications for this visit.         Final diagnoses:   Depression, major,  recurrent, severe with psychosis (H)       4/12/2019   [facility], [place], EMERGENCY DEPARTMENT     Quinn Ruelas MD  04/12/19 2042

## 2019-04-13 NOTE — PLAN OF CARE
"Pt is a 44 yr old F Vol brought in [facility] ED from a clinic. Per ED report, Pt brother called 911 due to pt not making sense, not sleeping well, and having auditory and visual hallucinations with SI this past week. Pt reports this being her first in patient hospitalization . Past month has been hard for her. Depressed, states\" I feel terrible about how I treat others and I need to treat my family better\". Stressors include relationship issues with family members and boyfriend. Pt denies current hallucinations or any current SI but states she had SI thoughts the past month with no plan. Urine test negative for drugs or alcohol.Pt appears sad, depressed, tired, and disoriented but cooperative during admission process. Oriented to unit and unit policies. Will continue to monitor.  "

## 2019-04-13 NOTE — H&P
Admitted:     04/12/2019      CHIEF COMPLAINT:  A 44-year-old woman admitted with symptoms of psychosis and depression.      HISTORY OF PRESENT ILLNESS:  The patient is a 44-year-old woman who initially presented to the Emergency Room at [facility] with complaints of anxiety and depression.  She is reporting a lot of stress.  At that time she denied who has hallucinations, suicidal thoughts.  She was referred to a therapist.  She reportedly saw her therapist and while there was very disorganized.  She was having hallucinations.  She was labile.  She was talking about suicidal thoughts.  She was tearful, so she was sent back and she agreed to hospitalization.      When I met with the patient, she reports that she feels guilty about disappointing her family.  She says she wants to be with her family.  She states that she feels terrible emotionally.  She was hospitalized about a year ago here, started on a combination of low-dose Lexapro, low-dose mirtazapine and low-dose Risperdal.  She did well with this for several months and because she was doing well, she stopped her medications.  She is agreeable to restarting a regimen that she was on previously.      PAST PSYCHIATRIC HISTORY:  The patient reports that this is her second hospitalization following that one last year.      PAST MEDICAL HISTORY:  The patient denies chronic or acute medical issues.      SUBSTANCE HISTORY:  The patient reports occasionally having a drink in a social way.  Denies other drug use.      REVIEW OF SYSTEMS:  The patient reports feeling tired.  Denies other problems on 10-point review of systems except as noted in HPI.      FAMILY HISTORY:  The patient reports brother with depression and substance use issues.      SOCIAL HISTORY:  The patient currently lives with boyfriend and 16-year-old daughter.  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      ALLERGIES:  NUTS, SHRIMP.      PRIOR TO ADMISSION MEDICATIONS:  None.      LABORATORY DATA:   Urine pregnancy is negative.  Urine toxicology is negative for drugs of abuse.      VITAL SIGNS:  Temperature 97.7, pulse is 86, respiratory rate is 18, blood pressure is 131/84, oxygen saturation 97% on room air.      PHYSICAL EXAMINATION:  I have reviewed physical exam as documented by emergency room physician, Quinn Ruelas MD dated 04/12/2019.  I have no additional findings at this time.      MENTAL STATUS EXAMINATION:  The patient is in bed.  She is awake.  She is wearing hospital scrubs.  She is oriented to person, place and date.  She is cooperative with good eye contact.  Speech is normal in rate and volume.  Language is intact.  Mood is anxious, depressed.  Affect is congruent to mood.  She has no psychomotor agitation or retardation.  Strength and tone and gait and station are within normal limits.  Speech is normal in rate and volume.  Language is intact.  Thought process is mildly disorganized.  Associations are intact.  Thought content is currently negative for suicidal thoughts or hallucinations per her report.  Recent and remote memory are intact.  Fund of knowledge is adequate.  Attention and concentration are intact.  Insight is fair, judgment is fair.      DIAGNOSES:   1.  Unspecified psychosis.  Differential includes brief psychotic disorder versus severe mood disorder with psychosis.   2.  Major depressive disorder, recurrent severe with psychotic features, most likely.   3.  Unspecified anxiety.      PLAN:   1.  The patient is agreeable to restarting Risperdal, will start at 0.5 mg twice daily.  In addition, agreeable to starting medication, she was on previously.  There is no clear benefit from being on both low dose mirtazapine and Lexapro.  Will start mirtazapine 15 mg at night, use that as her primary antidepressant.  If she is not able to tolerate it, will return to Lexapro.   2.  Legal:  The patient is currently voluntary.   3.  Disposition:  Anticipate patient will return home when more  psychiatrically stable.         ZEE SHARP MD             D: 2019   T: 2019   MT: PIPER      Name:     EILEEN MORALES   MRN:      [id number]        Account:      [id number]   :      1975        Admitted:     2019                   Document: [id number]

## 2019-04-13 NOTE — PROGRESS NOTES
04/13/19 0053   Patient Belongings   Did you bring any home meds/supplements to the hospital?  No   Patient Belongings Put in Hospital Secure Location (Security or Locker, etc.) clothing;earrings   Belongings Search Yes   Clothing Search Yes   Second Staff Logan IVAN RN and Bhupendra PADRON   Comment Non sent to security     Items kept in storage   Jacket, Bra, Earring, Pants, Sweater    Non sent to security    No ID, No Credit Card, No Cash    Admission Signature    Patient Signature    _______________    Staff Signature    ________________    Discharge Signature    All my personal items were returned to me    Patient Signature    ________________    Staff Signature    ________________

## 2019-04-14 LAB
ALBUMIN SERPL-MCNC: 3.7 G/DL (ref 3.4–5)
ALP SERPL-CCNC: 64 U/L (ref 40–150)
ALT SERPL W P-5'-P-CCNC: 15 U/L (ref 0–50)
ANION GAP SERPL CALCULATED.3IONS-SCNC: 7 MMOL/L (ref 3–14)
AST SERPL W P-5'-P-CCNC: 12 U/L (ref 0–45)
BASOPHILS # BLD AUTO: 0 10E9/L (ref 0–0.2)
BASOPHILS NFR BLD AUTO: 0.2 %
BILIRUB SERPL-MCNC: 0.4 MG/DL (ref 0.2–1.3)
BUN SERPL-MCNC: 16 MG/DL (ref 7–30)
CALCIUM SERPL-MCNC: 8.8 MG/DL (ref 8.5–10.1)
CHLORIDE SERPL-SCNC: 107 MMOL/L (ref 94–109)
CHOLEST SERPL-MCNC: 169 MG/DL
CO2 SERPL-SCNC: 29 MMOL/L (ref 20–32)
CREAT SERPL-MCNC: 0.83 MG/DL (ref 0.52–1.04)
DIFFERENTIAL METHOD BLD: NORMAL
EOSINOPHIL # BLD AUTO: 0.1 10E9/L (ref 0–0.7)
EOSINOPHIL NFR BLD AUTO: 1.6 %
ERYTHROCYTE [DISTWIDTH] IN BLOOD BY AUTOMATED COUNT: 13.1 % (ref 10–15)
GFR SERPL CREATININE-BSD FRML MDRD: 87 ML/MIN/{1.73_M2}
GLUCOSE SERPL-MCNC: 90 MG/DL (ref 70–99)
HCT VFR BLD AUTO: 41.7 % (ref 35–47)
HDLC SERPL-MCNC: 53 MG/DL
HGB BLD-MCNC: 13.4 G/DL (ref 11.7–15.7)
IMM GRANULOCYTES # BLD: 0 10E9/L (ref 0–0.4)
IMM GRANULOCYTES NFR BLD: 0.1 %
LDLC SERPL CALC-MCNC: 96 MG/DL
LYMPHOCYTES # BLD AUTO: 2.8 10E9/L (ref 0.8–5.3)
LYMPHOCYTES NFR BLD AUTO: 34.5 %
MCH RBC QN AUTO: 28.4 PG (ref 26.5–33)
MCHC RBC AUTO-ENTMCNC: 32.1 G/DL (ref 31.5–36.5)
MCV RBC AUTO: 88 FL (ref 78–100)
MONOCYTES # BLD AUTO: 0.6 10E9/L (ref 0–1.3)
MONOCYTES NFR BLD AUTO: 7.7 %
NEUTROPHILS # BLD AUTO: 4.5 10E9/L (ref 1.6–8.3)
NEUTROPHILS NFR BLD AUTO: 55.9 %
NONHDLC SERPL-MCNC: 116 MG/DL
NRBC # BLD AUTO: 0 10*3/UL
NRBC BLD AUTO-RTO: 0 /100
PLATELET # BLD AUTO: 295 10E9/L (ref 150–450)
POTASSIUM SERPL-SCNC: 4 MMOL/L (ref 3.4–5.3)
PROT SERPL-MCNC: 7.3 G/DL (ref 6.8–8.8)
RBC # BLD AUTO: 4.72 10E12/L (ref 3.8–5.2)
SODIUM SERPL-SCNC: 143 MMOL/L (ref 133–144)
TRIGL SERPL-MCNC: 98 MG/DL
TSH SERPL DL<=0.005 MIU/L-ACNC: 0.99 MU/L (ref 0.4–4)
WBC # BLD AUTO: 8 10E9/L (ref 4–11)

## 2019-04-14 PROCEDURE — 36415 COLL VENOUS BLD VENIPUNCTURE: CPT | Performed by: PSYCHIATRY & NEUROLOGY

## 2019-04-14 PROCEDURE — 12400001 ZZH R&B MH UMMC

## 2019-04-14 PROCEDURE — G0177 OPPS/PHP; TRAIN & EDUC SERV: HCPCS

## 2019-04-14 PROCEDURE — 84443 ASSAY THYROID STIM HORMONE: CPT | Performed by: PSYCHIATRY & NEUROLOGY

## 2019-04-14 PROCEDURE — 25000132 ZZH RX MED GY IP 250 OP 250 PS 637: Performed by: PSYCHIATRY & NEUROLOGY

## 2019-04-14 PROCEDURE — 80053 COMPREHEN METABOLIC PANEL: CPT | Performed by: PSYCHIATRY & NEUROLOGY

## 2019-04-14 PROCEDURE — 80061 LIPID PANEL: CPT | Performed by: PSYCHIATRY & NEUROLOGY

## 2019-04-14 PROCEDURE — 85025 COMPLETE CBC W/AUTO DIFF WBC: CPT | Performed by: PSYCHIATRY & NEUROLOGY

## 2019-04-14 RX ADMIN — HYDROXYZINE HYDROCHLORIDE 25 MG: 25 TABLET ORAL at 13:06

## 2019-04-14 RX ADMIN — RISPERIDONE 0.5 MG: 0.5 TABLET ORAL at 20:07

## 2019-04-14 RX ADMIN — RISPERIDONE 0.5 MG: 0.5 TABLET ORAL at 09:20

## 2019-04-14 RX ADMIN — MIRTAZAPINE 15 MG: 15 TABLET, FILM COATED ORAL at 20:07

## 2019-04-14 ASSESSMENT — ACTIVITIES OF DAILY LIVING (ADL)
HYGIENE/GROOMING: INDEPENDENT
DRESS: INDEPENDENT
LAUNDRY: WITH SUPERVISION
ORAL_HYGIENE: INDEPENDENT

## 2019-04-14 NOTE — PROGRESS NOTES
Patient was in room and in bed much of the shift.  Patient was out for meals and for phone calls.  Patient was very flat in affect.  Patient was tearful at times throughout shift.  Patient denies SI/SIB     04/14/19 1500   Behavioral Health   Hallucinations denies / not responding to hallucinations   Thinking poor concentration   Orientation person: oriented;place: oriented;date: oriented   Memory baseline memory   Insight poor   Judgement impaired   Eye Contact at examiner   Affect blunted, flat   Mood depressed   Physical Appearance/Attire disheveled   Hygiene neglected grooming - unclean body, hair, teeth   Suicidality other (see comments)  (denies)   1. Wish to be Dead No   2. Non-Specific Active Suicidal Thoughts  No   Self Injury other (see comment)  (denies)   Elopement   (none observed)   Activity isolative;withdrawn   Speech coherent   Medication Sensitivity no stated side effects   Psychomotor / Gait balanced;steady

## 2019-04-14 NOTE — PROGRESS NOTES
Pt was isolative and sad. Pt stated that she wishes she was dead and has thoughts but no plans. Pt stated how bad she feels about being a mother, a girlfriend, and at her work. Pt stated she took a prn and was advised to talk with her daughter to see when she can visit.        04/13/19 2300   Behavioral Health   Hallucinations denies / not responding to hallucinations   Thinking poor concentration   Orientation person: oriented;place: oriented;time: oriented;date: oriented   Memory baseline memory   Insight poor   Judgement impaired   Eye Contact at examiner   Affect sad   Mood depressed;anxious   Physical Appearance/Attire attire appropriate to age and situation   Hygiene well groomed   Suicidality thoughts only   1. Wish to be Dead Yes   Self Injury   (none observed )   Elopement   (none observed or stated on this shift )   Activity withdrawn;isolative   Speech coherent;clear   Medication Sensitivity no observed side effects;no stated side effects   Psychomotor / Gait balanced;steady   Psycho Education   Type of Intervention 1:1 intervention   Response participates with encouragement   Hours 0.5   Treatment Detail check in    Activities of Daily Living   Hygiene/Grooming independent   Oral Hygiene independent   Dress independent   Laundry with supervision   Room Organization independent

## 2019-04-14 NOTE — PROGRESS NOTES
Occupational Therapy Initial Note:   Pt attended 1 of 1 occupational therapy group today.  Pt was very quiet and did not interact with peers.  Pt would answer questions wit 1 or 2 word responses.  Pt appeared to have a very decreased attention span and had difficulty initiating a task.  Pt did complete self assessment form and identified her current main stressor as hearing voices.  She reports they are things that she has said in the past to her bf, daughter and things that she regrets.  Pt endorses feelings of anxiety, sadness, anger, mood swings, guilt and despair.  Pt reports negative thoughts, racing thoughts, difficulty concentrating, hearing voices and obsessive thoughts.  Pt report she has been withdrawing from people, having  difficulty in relationships and has money concerns.  Pt is able to identify 2 appropriate coping strategies , but has difficulty utilizing them at this time.  Pt reports she would like to learn to make choices and take action and improve her focus an concentration.  Pt is able to identity some support people in her life.  Pt had a difficult time identifying positives about self at this time.Pt demonstrated a flat affect and appears very depressed.  Interactions are minimal.  Pt will continue to be encouraged to attend groups for further assessment and to address goals identified on plan of care.

## 2019-04-14 NOTE — PROGRESS NOTES
"SPIRITUAL HEALTH SERVICES  [facility] ([facility]  ON-CALL VISIT     REFERRAL SOURCE: Pt request SH with admission.  Spoke with [facility] staff who checked in with pt. Pt states \"another time\", declining a visit on Sunday     PLAN: Will inform Unit  Connor for further follow-up.  Chaplains remain available per pt/family/staff request.     Rev. Rachel Villarreal MDiv, [facility]  Staff    Pager [phone]    "

## 2019-04-15 PROCEDURE — 25000132 ZZH RX MED GY IP 250 OP 250 PS 637: Performed by: PSYCHIATRY & NEUROLOGY

## 2019-04-15 PROCEDURE — 99232 SBSQ HOSP IP/OBS MODERATE 35: CPT | Performed by: PSYCHIATRY & NEUROLOGY

## 2019-04-15 PROCEDURE — G0177 OPPS/PHP; TRAIN & EDUC SERV: HCPCS

## 2019-04-15 PROCEDURE — 12400001 ZZH R&B MH UMMC

## 2019-04-15 PROCEDURE — H2032 ACTIVITY THERAPY, PER 15 MIN: HCPCS

## 2019-04-15 RX ADMIN — RISPERIDONE 0.5 MG: 0.5 TABLET ORAL at 20:39

## 2019-04-15 RX ADMIN — MIRTAZAPINE 15 MG: 15 TABLET, FILM COATED ORAL at 20:39

## 2019-04-15 RX ADMIN — RISPERIDONE 0.5 MG: 0.5 TABLET ORAL at 10:13

## 2019-04-15 ASSESSMENT — ACTIVITIES OF DAILY LIVING (ADL)
HYGIENE/GROOMING: INDEPENDENT
ORAL_HYGIENE: INDEPENDENT
DRESS: INDEPENDENT

## 2019-04-15 NOTE — PLAN OF CARE
"RN 48 hour assessment  Pt. isolative and withdrawn keeps to self spent a lot of time this evening in her room, observed to be napping. On approach pt. calm, pleasant. Pt. has flat blunted affect. Pt. states she is doing okay today except that \"I miss my family I want to get better so that I can take care of my daughter\". Pt. endorses anxiety 7/10, states that if she needs medication for anxiety she will approach nurse. Pt. denies depression, SI, SIB or hallucinations. Patient's daughter, brother, and niece visited this evening. Pt took medications without any problems. Nursing will continue to monitor.   "

## 2019-04-15 NOTE — PROGRESS NOTES
[facility] received message from pt's brother, Enrrique, [phone].  Discussed pt diagnosis, progress. Answered brother's questions regarding treatment, length of stay, discharge plans. Brother states that pt is a good mom, good person but is concerned that she be ok when discharged because she is home alone a lot. Pt's boyfriend works many hours, has her daughter but brother concerned about this.

## 2019-04-15 NOTE — PROGRESS NOTES
04/15/19 1400   Behavioral Health   Hallucinations denies / not responding to hallucinations   Thinking distractable   Orientation person: oriented;place: oriented;date: oriented;time: oriented   Memory baseline memory   Insight poor   Judgement impaired   Eye Contact at examiner   Affect full range affect   Mood mood is calm   Physical Appearance/Attire neat   Hygiene well groomed   Suicidality other (see comments)  (pt denies current SI)   1. Wish to be Dead No   2. Non-Specific Active Suicidal Thoughts  No   Self Injury   (pt denies current SIB)   Elopement   (none stated/observed)   Activity   (active )   Speech coherent;clear   Medication Sensitivity no stated side effects;no observed side effects   Psychomotor / Gait balanced;steady   Activities of Daily Living   Hygiene/Grooming independent   Oral Hygiene independent   Dress independent   Room Organization independent     Pt has been active in milieu throughout this shift. She attends groups and participates appropriately. Pt denies SI/SIB. She rates her depression and anxiety both at a 5 out of 10. No behavior concerns were noted this shift.

## 2019-04-15 NOTE — PROGRESS NOTES
[facility], [place]   Psychiatric Progress Note  Hospital Day: 2        Interim History:   The patient's care was discussed with the treatment team during the daily team meeting and/or staff's chart notes were reviewed.  Staff report patient has remained depressed and disorganized.    Upon interview, the patient states that she misses her family and is hoping to go home. After conversation she agrees to stay longer for more stability.    Psychiatric Symptoms: depression, disorganization, psychosis    Medication side effects reported: none    Other issues reported by patient: none         Medications:       fluticasone  1-2 spray Both Nostrils Daily     mirtazapine  15 mg Oral At Bedtime     risperiDONE  0.5 mg Oral BID          Allergies:     Allergies   Allergen Reactions     Nuts      Tight throat     Shrimp      Tight throat          Labs:     Recent Results (from the past 48 hour(s))   CBC with platelets differential    Collection Time: 04/14/19  7:34 AM   Result Value Ref Range    WBC 8.0 4.0 - 11.0 10e9/L    RBC Count 4.72 3.8 - 5.2 10e12/L    Hemoglobin 13.4 11.7 - 15.7 g/dL    Hematocrit 41.7 35.0 - 47.0 %    MCV 88 78 - 100 fl    MCH 28.4 26.5 - 33.0 pg    MCHC 32.1 31.5 - 36.5 g/dL    RDW 13.1 10.0 - 15.0 %    Platelet Count 295 150 - 450 10e9/L    Diff Method Automated Method     % Neutrophils 55.9 %    % Lymphocytes 34.5 %    % Monocytes 7.7 %    % Eosinophils 1.6 %    % Basophils 0.2 %    % Immature Granulocytes 0.1 %    Nucleated RBCs 0 0 /100    Absolute Neutrophil 4.5 1.6 - 8.3 10e9/L    Absolute Lymphocytes 2.8 0.8 - 5.3 10e9/L    Absolute Monocytes 0.6 0.0 - 1.3 10e9/L    Absolute Eosinophils 0.1 0.0 - 0.7 10e9/L    Absolute Basophils 0.0 0.0 - 0.2 10e9/L    Abs Immature Granulocytes 0.0 0 - 0.4 10e9/L    Absolute Nucleated RBC 0.0    Comprehensive metabolic panel    Collection Time: 04/14/19  7:34 AM   Result Value Ref Range    Sodium 143 133 - 144 mmol/L    Potassium  4.0 3.4 - 5.3 mmol/L    Chloride 107 94 - 109 mmol/L    Carbon Dioxide 29 20 - 32 mmol/L    Anion Gap 7 3 - 14 mmol/L    Glucose 90 70 - 99 mg/dL    Urea Nitrogen 16 7 - 30 mg/dL    Creatinine 0.83 0.52 - 1.04 mg/dL    GFR Estimate 87 >60 mL/min/[1.73_m2]    GFR Estimate If Black >90 >60 mL/min/[1.73_m2]    Calcium 8.8 8.5 - 10.1 mg/dL    Bilirubin Total 0.4 0.2 - 1.3 mg/dL    Albumin 3.7 3.4 - 5.0 g/dL    Protein Total 7.3 6.8 - 8.8 g/dL    Alkaline Phosphatase 64 40 - 150 U/L    ALT 15 0 - 50 U/L    AST 12 0 - 45 U/L   Lipid panel    Collection Time: 04/14/19  7:34 AM   Result Value Ref Range    Cholesterol 169 <200 mg/dL    Triglycerides 98 <150 mg/dL    HDL Cholesterol 53 >49 mg/dL    LDL Cholesterol Calculated 96 <100 mg/dL    Non HDL Cholesterol 116 <130 mg/dL   TSH with free T4 reflex and/or T3 as indicated    Collection Time: 04/14/19  7:34 AM   Result Value Ref Range    TSH 0.99 0.40 - 4.00 mU/L          Psychiatric Examination:     /75   Pulse 114   Temp 97.8  F (36.6  C) (Oral)   Resp 16   Wt 77.1 kg (170 lb)   LMP 03/10/2019 (Approximate)   SpO2 97%   BMI 27.44 kg/m    Weight is 170 lbs 0 oz  Body mass index is 27.44 kg/m .    Orthostatic Vitals     None            Appearance: awake, alert, adequately groomed and casually dressed  Attitude:  cooperative  Eye Contact:  good  Mood:  depressed  Affect:  guarded  Speech:  paucity of speech  Language: fluent and intact in English  Psychomotor, Gait, Musculoskeletal:  no evidence of tardive dyskinesia, dystonia, or tics and intact station, gait and muscle tone  Throught Process:  goal oriented  Associations:  no loose associations  Thought Content:  no evidence of suicidal ideation or homicidal ideation and some delusional thinking with signfiicant guilt and distortions in reality  Insight:  limited  Judgement:  fair  Oriented to:  time, person, and place  Attention Span and Concentration:  intact  Recent and Remote Memory:  intact  Fund of  Knowledge:  appropriate    Clinical Global Impressions  First:  Considering your total clinical experience with this particular patient population, how severe are the patient's symptoms at this time?: 7 (04/15/19 1413)  Compared to the patient's condition at the START of treatment, this patient's condition is:: 4 (04/15/19 1413)  Most recent:  Considering your total clinical experience with this particular patient population, how severe are the patient's symptoms at this time?: 7 (04/15/19 1413)  Compared to the patient's condition at the START of treatment, this patient's condition is:: 4 (04/15/19 1413)           Precautions:     Behavioral Orders   Procedures     Code 1 - Restrict to Unit     Routine Programming     As clinically indicated     Status 15     Every 15 minutes.     Suicide precautions     Patients on Suicide Precautions should have a Combination Diet ordered that includes a Diet selection(s) AND a Behavioral Tray selection for Safe Tray - with utensils, or Safe Tray - NO utensils            Diagnoses:   Depression, major, recurrent, severe with psychosis  Unspecified anxiety         Assessment & Plan:     Target psychiatric symptoms and interventions:  Depression with psychosis  -continue current dose of risperidone  -continue current dose of Lexapro    Medical Problems and Treatments:  None    Behavioral/Psychological/Social:  Encourage unit programming      Disposition Plan   Reason for ongoing admission: poses an imminent risk to self and is unable to care for self due to severe psychosis or nessa  Discharge location: home with family  Discharge Medications: not ordered  Follow-up Appointments: not scheduled  Legal Status: voluntary  Entered by: Jarad Martinez on 4/15/2019 at 2:13 PM

## 2019-04-15 NOTE — PLAN OF CARE
BEHAVIORAL TEAM DISCUSSION    Participants: Provider: ANABELLA Martinez MD  CTC: Swati Maria MS,   Nurse: Jenny Dietz, RN   Progress: Pt was admitted secondary to severe depression with decrease in functioning, passive SI, disorganization.   Continued Stay Criteria/Rationale: Pt remains severely depressed, mild disorganization  Medical/Physical: see chart  Precautions:   Behavioral Orders   Procedures     Code 1 - Restrict to Unit     Routine Programming     As clinically indicated     Status 15     Every 15 minutes.     Suicide precautions     Patients on Suicide Precautions should have a Combination Diet ordered that includes a Diet selection(s) AND a Behavioral Tray selection for Safe Tray - with utensils, or Safe Tray - NO utensils       Plan: The patient will continue to meet w/ the treatment team for assessment and treatment planning, CTC will continue to work w/ for discharge planning.    Rationale for change in precautions or plan: Pt to remain inpatient for safety and stabilization

## 2019-04-15 NOTE — PROGRESS NOTES
CTC met with pt, discussed discharge plan. Pt states DBT has been helpful in the past and she would like to return. Pt had [facility] at [facility] in [place].

## 2019-04-16 PROCEDURE — 12400001 ZZH R&B MH UMMC

## 2019-04-16 PROCEDURE — G0177 OPPS/PHP; TRAIN & EDUC SERV: HCPCS

## 2019-04-16 PROCEDURE — 25000132 ZZH RX MED GY IP 250 OP 250 PS 637: Performed by: PSYCHIATRY & NEUROLOGY

## 2019-04-16 PROCEDURE — 99207 ZZC CDG-CODE CATEGORY CHANGED: CPT | Performed by: PSYCHIATRY & NEUROLOGY

## 2019-04-16 PROCEDURE — H2032 ACTIVITY THERAPY, PER 15 MIN: HCPCS

## 2019-04-16 PROCEDURE — 99232 SBSQ HOSP IP/OBS MODERATE 35: CPT | Performed by: PSYCHIATRY & NEUROLOGY

## 2019-04-16 RX ORDER — MIRTAZAPINE 15 MG/1
15 TABLET, FILM COATED ORAL AT BEDTIME
Qty: 30 TABLET | Refills: 0 | Status: SHIPPED | OUTPATIENT
Start: 2019-04-16 | End: 2023-03-02

## 2019-04-16 RX ORDER — RISPERIDONE 0.5 MG/1
0.5 TABLET ORAL 2 TIMES DAILY
Qty: 60 TABLET | Refills: 0 | Status: SHIPPED | OUTPATIENT
Start: 2019-04-16 | End: 2023-03-02

## 2019-04-16 RX ADMIN — RISPERIDONE 0.5 MG: 0.5 TABLET ORAL at 09:18

## 2019-04-16 RX ADMIN — RISPERIDONE 0.5 MG: 0.5 TABLET ORAL at 21:17

## 2019-04-16 RX ADMIN — FLUTICASONE PROPIONATE 1 SPRAY: 50 SPRAY, METERED NASAL at 09:18

## 2019-04-16 RX ADMIN — HYDROXYZINE HYDROCHLORIDE 25 MG: 25 TABLET ORAL at 15:11

## 2019-04-16 RX ADMIN — MIRTAZAPINE 15 MG: 15 TABLET, FILM COATED ORAL at 21:17

## 2019-04-16 ASSESSMENT — ACTIVITIES OF DAILY LIVING (ADL)
ORAL_HYGIENE: INDEPENDENT
LAUNDRY: UNABLE TO COMPLETE
DRESS: STREET CLOTHES
HYGIENE/GROOMING: INDEPENDENT
HYGIENE/GROOMING: INDEPENDENT
DRESS: INDEPENDENT
ORAL_HYGIENE: INDEPENDENT

## 2019-04-16 NOTE — DISCHARGE INSTRUCTIONS
Behavioral Discharge Planning and Instructions      Summary:  You were admitted on 4/12/2019  due to Depression and Psychotic Symptomology.  You were treated by Dr. Jarad Martinez MD and discharged on 04/16/2019 from [facility] to Home.      Principal Diagnosis:   Depression, major, recurrent, severe with psychosis  Unspecified anxiety    Health Care Follow-up Appointments:   If you cannot make any of these appointments please call to cancel at least 24 hours in advance.  Therapy Appointment Date/Time: Tuesday 4/23 at 2:30 pm          Therapist: Estephanie Lua  Day Track DBT intake Appointment Date/Time: Tuesday 4/30 at 9 am     Psychiatry appointment Date/Time: Monday 4/29 at 11:40 am       Provider: Dr. Lauren Luna  [facility]  [address], [place], [address]  Phone:  [phone]   Fax: [phone]    A Medication Therapy Management Pharmacist will contact you by phone after your discharge. You have identified Monday 4/22 at 4 pm as the best time for this call and your phone number as [phone].    Attend all scheduled appointments with your outpatient providers. Call at least 24 hours in advance if you need to reschedule an appointment to ensure continued access to your outpatient providers.   Major Treatments, Procedures and Findings:  You were provided with: a psychiatric assessment, medication evaluation and/or management, group therapy and milieu management    Symptoms to Report: feeling more aggressive, increased confusion, losing more sleep, mood getting worse or thoughts of suicide    Early warning signs can include: increased depression or anxiety sleep disturbances increased thoughts or behaviors of suicide or self-harm  increased unusual thinking, such as paranoia or hearing voices    Safety and Wellness:  Take all medicines as directed.  Make no changes unless your doctor suggests them.      Follow treatment recommendations.  Refrain from alcohol and non-prescribed drugs.  If there  "is a concern for safety, call 911.    Resources:   [facility]: [phone]    [place] on [place]ness (www.[place].macho.org): 319.794.6800 or 069-172-9248.  Suicide Awareness Voices of Education (SAVE) (www.save.org): 205-194-GFII (8933)  National Suicide Prevention Line (www.mentalhealthmn.org): 347-904-USZB (3810)  Mental Health Consumer/Survivor Network of [place] (www.mhcsn.net): 646.731.9666 or 869-648-8281  Mental Health Association of [place] (www.mentalhealth.org): 496.216.1334 or 733-743-1447  Self- Management and Recovery Training., SMART-- Toll free: 228.728.3848  www.Z[employer].Famo.us  Text 4 Life: txt \"LIFE\" to 81136 for immediate support and crisis intervention  Crisis text line: Text \"[place]\" to 652467. Free, confidential, 24/7.  Crisis Intervention: 416.428.3434 or 266-441-4698. Call anytime for help.       The treatment team has appreciated the opportunity to work with you.     If you have any questions or concerns our unit number is [phone]  You may be receiving a follow-up phone call within the next three days from a representative from behavioral health.          "

## 2019-04-16 NOTE — PLAN OF CARE
48 hour nursing assessment:  Patient spent on the unit as well as in her room this shift. Pt stated that she wanted to go home because she needs to take care of her daughter. This told the patient that her provider would have to be the one to make that decision. The patient appeared satisfied with this answer. Patient has a flat and blunted affect, but has been pleasant with staff and peers. Patient came out of her room to eat meals and attend groups. Pt denies SI/SIB and says that she is feeling better pertaining to that. When this writer asked about her hallucinations, the patient did not answer and just said that she is ok. Will continue to monitor.

## 2019-04-16 NOTE — DISCHARGE SUMMARY
Psychiatric Discharge Summary    Prasanth Coy MRN# [id number]   Age: 44 year old YOB: 1975     Date of Admission:  4/12/2019  Date of Discharge:  4/19/2019  Admitting Physician:  Jarad Martinez MD  Discharge Physician:  Jarad Martinez MD        Event Leading to Hospitalization:   The patient is a 44-year-old woman who initially presented to the Emergency Room at [facility] with complaints of anxiety and depression.  She is reporting a lot of stress.  At that time she denied who has hallucinations, suicidal thoughts.  She was referred to a therapist.  She reportedly saw her therapist and while there was very disorganized.  She was having hallucinations.  She was labile.  She was talking about suicidal thoughts.  She was tearful, so she was sent back and she agreed to hospitalization.            See Admission note by Jarad Martinez MD on 4/13/2019 for additional details.          Diagnoses:     Depression, major, recurrent, severe with psychosis  Unspecified anxiety         Labs:     Results for orders placed or performed during the hospital encounter of 04/12/19   HCG qualitative urine   Result Value Ref Range    HCG Qual Urine Negative NEG^Negative   Drug abuse screen 6 urine (tox)   Result Value Ref Range    Amphetamine Qual Urine Negative NEG^Negative    Barbiturates Qual Urine Negative NEG^Negative    Benzodiazepine Qual Urine Negative NEG^Negative    Cannabinoids Qual Urine Negative NEG^Negative    Cocaine Qual Urine Negative NEG^Negative    Ethanol Qual Urine Negative NEG^Negative    Opiates Qualitative Urine Negative NEG^Negative   CBC with platelets differential   Result Value Ref Range    WBC 8.0 4.0 - 11.0 10e9/L    RBC Count 4.72 3.8 - 5.2 10e12/L    Hemoglobin 13.4 11.7 - 15.7 g/dL    Hematocrit 41.7 35.0 - 47.0 %    MCV 88 78 - 100 fl    MCH 28.4 26.5 - 33.0 pg    MCHC 32.1 31.5 - 36.5 g/dL    RDW 13.1 10.0 - 15.0 %    Platelet Count 295 150 - 450 10e9/L    Diff Method  Automated Method     % Neutrophils 55.9 %    % Lymphocytes 34.5 %    % Monocytes 7.7 %    % Eosinophils 1.6 %    % Basophils 0.2 %    % Immature Granulocytes 0.1 %    Nucleated RBCs 0 0 /100    Absolute Neutrophil 4.5 1.6 - 8.3 10e9/L    Absolute Lymphocytes 2.8 0.8 - 5.3 10e9/L    Absolute Monocytes 0.6 0.0 - 1.3 10e9/L    Absolute Eosinophils 0.1 0.0 - 0.7 10e9/L    Absolute Basophils 0.0 0.0 - 0.2 10e9/L    Abs Immature Granulocytes 0.0 0 - 0.4 10e9/L    Absolute Nucleated RBC 0.0    Comprehensive metabolic panel   Result Value Ref Range    Sodium 143 133 - 144 mmol/L    Potassium 4.0 3.4 - 5.3 mmol/L    Chloride 107 94 - 109 mmol/L    Carbon Dioxide 29 20 - 32 mmol/L    Anion Gap 7 3 - 14 mmol/L    Glucose 90 70 - 99 mg/dL    Urea Nitrogen 16 7 - 30 mg/dL    Creatinine 0.83 0.52 - 1.04 mg/dL    GFR Estimate 87 >60 mL/min/[1.73_m2]    GFR Estimate If Black >90 >60 mL/min/[1.73_m2]    Calcium 8.8 8.5 - 10.1 mg/dL    Bilirubin Total 0.4 0.2 - 1.3 mg/dL    Albumin 3.7 3.4 - 5.0 g/dL    Protein Total 7.3 6.8 - 8.8 g/dL    Alkaline Phosphatase 64 40 - 150 U/L    ALT 15 0 - 50 U/L    AST 12 0 - 45 U/L   Lipid panel   Result Value Ref Range    Cholesterol 169 <200 mg/dL    Triglycerides 98 <150 mg/dL    HDL Cholesterol 53 >49 mg/dL    LDL Cholesterol Calculated 96 <100 mg/dL    Non HDL Cholesterol 116 <130 mg/dL   TSH with free T4 reflex and/or T3 as indicated   Result Value Ref Range    TSH 0.99 0.40 - 4.00 mU/L              Consults:   No consultations were requested during this admission         Hospital Course:   Prasanth Coy was admitted to [facility] with attending Jarad Martinez MD as a voluntary patient. The patient was placed under status 15 (15 minute checks) to ensure patient safety.     The patient presented as severely depressed and with almost delusional level of guilt. She appeared paranoid as well. She agreed to starting medications that she had previously stabilized on.  A few days into her stay,  she requested to leave. I was unable to talk her into staying and was prepared to discharge her against medical advice. Her father and brother called to speak with her and convinced her to stay. She ended up staying 3 more days. Today she was requesting to leave. She appeared happy and was not showing signs of paranoia, depression, or other psychosis. She reported to feeling much better and indicated she was glad she stayed. She denied any suicidal thoughts and believed she was ready to return home.    Prasanth Coy was released to home. At the time of discharge Prasanth Coy was determined to not be a danger to herself or others.          Discharge Medications:     Current Discharge Medication List      START taking these medications    Details   mirtazapine (REMERON) 15 MG tablet Take 1 tablet (15 mg) by mouth At Bedtime  Qty: 30 tablet, Refills: 0    Associated Diagnoses: Depression, major, recurrent, severe with psychosis (H)      risperiDONE (RISPERDAL) 0.5 MG tablet Take 1 tablet (0.5 mg) by mouth 2 times daily  Qty: 60 tablet, Refills: 0    Associated Diagnoses: Depression, major, recurrent, severe with psychosis (H)         CONTINUE these medications which have NOT CHANGED    Details   fluticasone (FLONASE) 50 MCG/ACT nasal spray Spray 1-2 sprays into both nostrils daily  Qty: 16 g, Refills: 3    Associated Diagnoses: Seasonal allergic rhinitis due to pollen      VENTOLIN  (90 Base) MCG/ACT inhaler INL 2 PFS ITL Q 6 H PRF SOB OR DIFFICULT BREATHING OR WHZ  Refills: 1         STOP taking these medications       escitalopram (LEXAPRO) 5 MG tablet Comments:   Reason for Stopping:                    Psychiatric Examination:   Appearance:  awake, alert, adequately groomed and casually dressed  Attitude:  cooperative  Eye Contact:  good  Mood:  good  Affect:  appropriate and in normal range and intensity is normal  Speech:  clear, coherent  Psychomotor Behavior:  no evidence of tardive dyskinesia, dystonia,  or tics and intact station, gait and muscle tone  Thought Process:  logical, linear and goal oriented  Associations:  no loose associations  Thought Content:  no evidence of suicidal ideation or homicidal ideation and no evidence of psychotic thought  Insight:  fair  Judgment:  intact  Oriented to:  time, person, and place  Attention Span and Concentration:  intact  Recent and Remote Memory:  intact  Language: Able to name objects, Able to repeat phrases and Able to read and write  Fund of Knowledge: appropriate  Muscle Strength and Tone: normal  Gait and Station: Normal         Discharge Plan:      Health Care Follow-up Appointments:   If you cannot make any of these appointments please call to cancel at least 24 hours in advance.  Therapy Appointment Date/Time: Tuesday 4/23 at 2:30 pm          Therapist: Estephanie Lua  Day [facility] DBT intake Appointment Date/Time: Tuesday 4/30 at 9 am     Psychiatry appointment Date/Time: Monday 4/29 at 11:40 am       Provider: Dr. Lauren Luna  [facility]  [address] [place], [place] [address] , [place], [address]  Phone:  [phone]   Fax: [phone]    Attestation:  The patient has been seen and evaluated by me,  Jarad Martinez MD     On the day of discharge, I saw the patient and performed the above examination, reviewed discharge medications, reviewed follow-up plan, and assessed safety for discharge. I spent greater than 30 minutes on these tasks.

## 2019-04-16 NOTE — PROGRESS NOTES
Call from pt's father, [phone]. Discussed pt's request for pt to discharge. Father will call pt and discuss staying longer    Call from pt's brother, Enrrique, [phone]. Discussed discharge plan.

## 2019-04-16 NOTE — PROGRESS NOTES
04/15/19 2000   General Information   Art Directive other (see comments)   AT directive was to create an image of a safe place. Goals of directive: trauma containment, emotional expression. Pt was a positive participant, focused on task for the full duration of group. Pt created an image of a beach with sun and ocean and shared with author.  Pt shared that her safe place is a beach in [place] where she has spent time with family and friends. Pts mood was calm.

## 2019-04-17 PROCEDURE — G0177 OPPS/PHP; TRAIN & EDUC SERV: HCPCS

## 2019-04-17 PROCEDURE — 25000132 ZZH RX MED GY IP 250 OP 250 PS 637: Performed by: PSYCHIATRY & NEUROLOGY

## 2019-04-17 PROCEDURE — 99231 SBSQ HOSP IP/OBS SF/LOW 25: CPT | Performed by: PSYCHIATRY & NEUROLOGY

## 2019-04-17 PROCEDURE — 12400001 ZZH R&B MH UMMC

## 2019-04-17 RX ADMIN — RISPERIDONE 0.5 MG: 0.5 TABLET ORAL at 08:20

## 2019-04-17 RX ADMIN — MIRTAZAPINE 15 MG: 15 TABLET, FILM COATED ORAL at 22:12

## 2019-04-17 RX ADMIN — RISPERIDONE 0.5 MG: 0.5 TABLET ORAL at 22:12

## 2019-04-17 RX ADMIN — FLUTICASONE PROPIONATE 2 SPRAY: 50 SPRAY, METERED NASAL at 08:20

## 2019-04-17 ASSESSMENT — ACTIVITIES OF DAILY LIVING (ADL)
HYGIENE/GROOMING: INDEPENDENT
ORAL_HYGIENE: INDEPENDENT
ORAL_HYGIENE: INDEPENDENT
DRESS: SCRUBS (BEHAVIORAL HEALTH);INDEPENDENT
DRESS: INDEPENDENT
HYGIENE/GROOMING: HANDWASHING;SHOWER;INDEPENDENT

## 2019-04-17 NOTE — PLAN OF CARE
Writer received two phone calls from patient's dad in [place] and one from her boyfriend, expressing concern about her potential discharge.  Bf reported that patient appeared confused and told him that she had to leave, and was reassured that she had in fact requested discharged and been strongly discouraged by all staff from pursuing this option.  Patient has agreed to stay until the weekend.  She denied depression and SI, although endorsed anxiety.  Patient appears to be struggling with word finding and concentration, and may be minimizing her actual mental state.

## 2019-04-17 NOTE — PROGRESS NOTES
[facility], [place]   Psychiatric Progress Note  Hospital Day: 3        Interim History:   The patient's care was discussed with the treatment team during the daily team meeting and/or staff's chart notes were reviewed.  Staff report patient had been asking to leave.    Upon interview, the patient indicates readiness to leave. She still appears quite anxious and somewhat depressed. She denies suicidal thoughts. I indicated that it was my recommendation she stay a few more days. She declined.    I spoke with the patient's brother who called the unit shortly after I met with the patient. She had called him for a ride. I explained that I made recommendation to stay in the hospital, but I cannot hold her against her will. He indicated that he was going to call the patient to try to talk her into staying.    Based on review of the chart this evening, it appears as though the patient remains hospitalized. No RN note discussing details.    Psychiatric Symptoms: depression, disorganization, psychosis    Medication side effects reported: none    Other issues reported by patient: none         Medications:       fluticasone  1-2 spray Both Nostrils Daily     mirtazapine  15 mg Oral At Bedtime     risperiDONE  0.5 mg Oral BID          Allergies:     Allergies   Allergen Reactions     Nuts      Tight throat     Shrimp      Tight throat          Labs:     No results found for this or any previous visit (from the past 48 hour(s)).       Psychiatric Examination:     /78   Pulse 101   Temp 97.6  F (36.4  C) (Oral)   Resp 16   Wt 77.1 kg (170 lb)   LMP 03/10/2019 (Approximate)   SpO2 99%   BMI 27.44 kg/m    Weight is 170 lbs 0 oz  Body mass index is 27.44 kg/m .    Orthostatic Vitals       Most Recent      Sitting Orthostatic /78 04/16 1644    Sitting Orthostatic Pulse (bpm) 101 04/16 1644    Standing Orthostatic /79 04/16 1644    Standing Orthostatic Pulse (bpm) 116 04/16 1644             Appearance: awake, alert, adequately groomed and casually dressed  Attitude:  cooperative  Eye Contact:  good  Mood:  anxious and better  Affect:  guarded  Speech:  clear, coherent  Language: fluent and intact in English  Psychomotor, Gait, Musculoskeletal:  no evidence of tardive dyskinesia, dystonia, or tics and intact station, gait and muscle tone  Throught Process:  goal oriented  Associations:  no loose associations  Thought Content:  no evidence of suicidal ideation or homicidal ideation and some delusional thinking with signfiicant guilt and distortions in reality  Insight:  limited  Judgement:  fair  Oriented to:  time, person, and place  Attention Span and Concentration:  intact  Recent and Remote Memory:  intact  Fund of Knowledge:  appropriate    Clinical Global Impressions  First:  Considering your total clinical experience with this particular patient population, how severe are the patient's symptoms at this time?: 7 (04/15/19 1413)  Compared to the patient's condition at the START of treatment, this patient's condition is:: 4 (04/15/19 1413)  Most recent:  Considering your total clinical experience with this particular patient population, how severe are the patient's symptoms at this time?: 7 (04/15/19 1413)  Compared to the patient's condition at the START of treatment, this patient's condition is:: 4 (04/15/19 1413)           Precautions:     Behavioral Orders   Procedures     Code 1 - Restrict to Unit     Routine Programming     As clinically indicated     Status 15     Every 15 minutes.     Suicide precautions     Patients on Suicide Precautions should have a Combination Diet ordered that includes a Diet selection(s) AND a Behavioral Tray selection for Safe Tray - with utensils, or Safe Tray - NO utensils            Diagnoses:   Depression, major, recurrent, severe with psychosis  Unspecified anxiety         Assessment & Plan:     Target psychiatric symptoms and interventions:  Depression  with psychosis  -continue current dose of risperidone  -continue current dose of Lexapro    Medical Problems and Treatments:  None    Behavioral/Psychological/Social:  Encourage unit programming      Disposition Plan   Reason for ongoing admission: is unable to care for self due to severe psychosis or nessa  Discharge location: home with family  Discharge Medications: ordered.  Follow-up Appointments: scheduled  Legal Status: voluntary  Entered by: Jarad Martinez on 4/16/2019 at 9:21 PM

## 2019-04-17 NOTE — PROGRESS NOTES
"Initiated 3 of 3 OT groups offered. Anxious mood and affect. Pt was given and completed a written self assessment. OT purpose was explained with a value of having involvement in tx plan, and provided options to meet self identified goals. Will assess further in the areas of organization, problem solving, and concentration. Noted she has had increased stress for 1-2 months. Experiencing: sadness, anxiety, negative racing thoughts, feeling overwhelmed, trouble concentrating, AH, obsessive thoughts, withdrawal from others, decreased motivation/interest and relationship issues.Her coping skills were identified as: talking to someone and spirituality. Goals selected  Address while in-pt. Include: find supports/resources, learn about dx's, and manage anxiety. Current supports include: \"family, friends and therapist\". Decisive, able to follow 2-3 step verbal directions with demonstration with good results. Attentive to details. When not involved in task was easily distracted/proccupied as she was observed staring off and needed to take frequent breaks. Superficial and soft spoken in group discussion/activity. Was able to ID emotions she has experienced in the past. Will continue to encourage group attendance and participation for increased self awareness and skill development regarding self management.    "

## 2019-04-17 NOTE — PROGRESS NOTES
04/16/19 2100   Therapeutic Recreation   Type of Intervention structured groups   Activity leisure education   Response Participates, initiates socially appropriate   Hours 1     Pt participated in Therapeutic Recreation group with focus on leisure education, socialization, and acquisition of knowledge and skills. Pt was engaged and cooperative in group recreational intervention; leisure inventory. Pt participated through the entire duration of the group creating a list of healthy recreation outlets. Pt contributed many healthy interests and hobbies. Pt stated the activity was enjoyable, helping her remember ways to cope with stress. Showed progress in session goals. Pt mood was calm and was appropriate with interactions.

## 2019-04-17 NOTE — PROGRESS NOTES
Pt attended CTC group; discussed social support issues and conflicts. Discussed types of relationships.

## 2019-04-17 NOTE — PROGRESS NOTES
Pt awake entire shift.  Pt ate meals, attended groups, and is social with others.  Pt reports to improvement overall, better mood, focus, and concentration.  Pt is cooperative and pleasant.       04/17/19 1416   Sleep/Rest/Relaxation   Day/Evening Time Hours up all shift   Behavioral Health   Hallucinations denies / not responding to hallucinations   Thinking other (see comment)  (improved)   Orientation person: oriented;place: oriented;date: oriented;time: oriented   Memory baseline memory   Insight insight appropriate to situation   Judgement   (improved)   Eye Contact at examiner   Affect other (see comments)  (brighter)   Mood mood is calm   Physical Appearance/Attire neat   Hygiene well groomed   Suicidality other (see comments)  (denies)   1. Wish to be Dead No   2. Non-Specific Active Suicidal Thoughts  No   Activity other (see comment)  (attending groups)   Speech clear;coherent   Psychomotor / Gait balanced;steady   Coping/Psychosocial   Verbalized Emotional State acceptance;hopefulness;relief   Plan of Care Reviewed With patient   Psycho Education   Type of Intervention 1:1 intervention   Response participates, initiates socially appropriate   Activities of Daily Living   Hygiene/Grooming handwashing;shower;independent   Oral Hygiene independent   Dress scrubs (behavioral health);independent   Activity   Activity Assistance Provided independent

## 2019-04-17 NOTE — PROGRESS NOTES
[facility], [place]   Psychiatric Progress Note  Hospital Day: 4        Interim History:   The patient's care was discussed with the treatment team during the daily team meeting and/or staff's chart notes were reviewed.  Staff report patient decided to stay and appears more calm today.    Upon interview, the patient reports that she is feeling better. She denies medication side effects. She is agreeable to staying for now and hopes to e ready by the end of the week.      Psychiatric Symptoms: improving. Ongoing depression and anxiety.    Medication side effects reported: none    Other issues reported by patient: none         Medications:       fluticasone  1-2 spray Both Nostrils Daily     mirtazapine  15 mg Oral At Bedtime     risperiDONE  0.5 mg Oral BID          Allergies:     Allergies   Allergen Reactions     Nuts      Tight throat     Shrimp      Tight throat          Labs:     No results found for this or any previous visit (from the past 48 hour(s)).       Psychiatric Examination:     /78   Pulse 101   Temp 97.3  F (36.3  C)   Resp 16   Wt 77.1 kg (170 lb)   LMP 03/10/2019 (Approximate)   SpO2 99%   BMI 27.44 kg/m    Weight is 170 lbs 0 oz  Body mass index is 27.44 kg/m .    Orthostatic Vitals       Most Recent      Sitting Orthostatic /77 04/17 0841    Sitting Orthostatic Pulse (bpm) 87 04/17 0841    Standing Orthostatic /78 04/17 0841    Standing Orthostatic Pulse (bpm) 64 04/17 0841            Appearance: awake, alert, adequately groomed and casually dressed  Attitude:  cooperative  Eye Contact:  good  Mood:  better  Affect:  mood congruent  Speech:  clear, coherent  Language: fluent and intact in English  Psychomotor, Gait, Musculoskeletal:  no evidence of tardive dyskinesia, dystonia, or tics and intact station, gait and muscle tone  Throught Process:  goal oriented  Associations:  no loose associations  Thought Content:  no evidence of suicidal  ideation or homicidal ideation, no auditory hallucinations present and no visual hallucinations present  Insight:  fair  Judgement:  intact  Oriented to:  time, person, and place  Attention Span and Concentration:  intact  Recent and Remote Memory:  intact  Fund of Knowledge:  appropriate    Clinical Global Impressions  First:  Considering your total clinical experience with this particular patient population, how severe are the patient's symptoms at this time?: 7 (04/15/19 1413)  Compared to the patient's condition at the START of treatment, this patient's condition is:: 4 (04/15/19 1413)  Most recent:  Considering your total clinical experience with this particular patient population, how severe are the patient's symptoms at this time?: 7 (04/15/19 1413)  Compared to the patient's condition at the START of treatment, this patient's condition is:: 4 (04/15/19 1413)           Precautions:     Behavioral Orders   Procedures     Code 1 - Restrict to Unit     Routine Programming     As clinically indicated     Status 15     Every 15 minutes.     Suicide precautions     Patients on Suicide Precautions should have a Combination Diet ordered that includes a Diet selection(s) AND a Behavioral Tray selection for Safe Tray - with utensils, or Safe Tray - NO utensils            Diagnoses:   Depression, major, recurrent, severe with psychosis  Unspecified anxiety         Assessment & Plan:     Target psychiatric symptoms and interventions:  Depression with psychosis  -continue current dose of risperidone  -continue current dose of Lexapro    Medical Problems and Treatments:  None    Behavioral/Psychological/Social:  Encourage unit programming      Disposition Plan   Reason for ongoing admission: is unable to care for self due to severe psychosis or nessa  Discharge location: home with family  Discharge Medications: ordered.  Follow-up Appointments: scheduled  Legal Status: voluntary  Entered by: Jarad Martinez on 4/17/2019  at 2:05 PM

## 2019-04-18 PROCEDURE — G0177 OPPS/PHP; TRAIN & EDUC SERV: HCPCS

## 2019-04-18 PROCEDURE — 12400001 ZZH R&B MH UMMC

## 2019-04-18 PROCEDURE — H2032 ACTIVITY THERAPY, PER 15 MIN: HCPCS

## 2019-04-18 PROCEDURE — 25000132 ZZH RX MED GY IP 250 OP 250 PS 637: Performed by: PSYCHIATRY & NEUROLOGY

## 2019-04-18 RX ADMIN — FLUTICASONE PROPIONATE 2 SPRAY: 50 SPRAY, METERED NASAL at 10:37

## 2019-04-18 RX ADMIN — MIRTAZAPINE 15 MG: 15 TABLET, FILM COATED ORAL at 21:11

## 2019-04-18 RX ADMIN — RISPERIDONE 0.5 MG: 0.5 TABLET ORAL at 10:36

## 2019-04-18 RX ADMIN — RISPERIDONE 0.5 MG: 0.5 TABLET ORAL at 21:11

## 2019-04-18 NOTE — PROGRESS NOTES
Message from pt's sister, Conchis, [phone]. CTC called back. Sister states that parents are considering visiting to stay with pt for a period of time to help with pt's transition out of the hospital. CTC discussed this further with sister; agreed that additional support would be helpful.

## 2019-04-18 NOTE — PROGRESS NOTES
"Pt had bright affect this shift. Laughing, social with staff and peers. Said, \"I feel better everyday.\" Denies SI/HI/SIB. Is looking forward to discharge this week.  "

## 2019-04-18 NOTE — PROGRESS NOTES
Attended and actively participated in 2 of 2 OT groups offered. Anxious mood and affect. Followed the lead of peers and repeated 2 step verbal directions with demonstration. Noted interest in learning new creative task and ask to be shown how. Eager to participate in relaxation exercises. Noted positive experience reminding her of the benefits derived with consistent integration/practice.

## 2019-04-18 NOTE — PROGRESS NOTES
Today in Mental Health Education the topic was negative self talk.  Pt attended group and was an active participate, well engaged and supportive of peers.

## 2019-04-19 VITALS
DIASTOLIC BLOOD PRESSURE: 70 MMHG | BODY MASS INDEX: 25.91 KG/M2 | WEIGHT: 160.5 LBS | SYSTOLIC BLOOD PRESSURE: 106 MMHG | HEART RATE: 87 BPM | RESPIRATION RATE: 17 BRPM | OXYGEN SATURATION: 99 % | TEMPERATURE: 98 F

## 2019-04-19 PROCEDURE — 99239 HOSP IP/OBS DSCHRG MGMT >30: CPT | Performed by: PSYCHIATRY & NEUROLOGY

## 2019-04-19 PROCEDURE — G0177 OPPS/PHP; TRAIN & EDUC SERV: HCPCS

## 2019-04-19 PROCEDURE — H2032 ACTIVITY THERAPY, PER 15 MIN: HCPCS

## 2019-04-19 PROCEDURE — 25000132 ZZH RX MED GY IP 250 OP 250 PS 637: Performed by: PSYCHIATRY & NEUROLOGY

## 2019-04-19 RX ADMIN — FLUTICASONE PROPIONATE 2 SPRAY: 50 SPRAY, METERED NASAL at 08:31

## 2019-04-19 RX ADMIN — RISPERIDONE 0.5 MG: 0.5 TABLET ORAL at 08:32

## 2019-04-19 ASSESSMENT — ACTIVITIES OF DAILY LIVING (ADL)
LAUNDRY: UNABLE TO COMPLETE
ORAL_HYGIENE: INDEPENDENT
DRESS: INDEPENDENT
HYGIENE/GROOMING: INDEPENDENT

## 2019-04-19 NOTE — PROGRESS NOTES
[employer] Patient discharging 4/19/2019 accompanied by TAXI and destination is HOME.    Discharge paperwork and medications reviewed with patient who verbalizes understanding.     Copies provided: AVS X      Med Rec X MedsX  Security X   Locker X     DISCHARGE FLOW SHEET: X    CARE PLAN COMPLETE: X    EDUCATION COMPLETE: X    Illness Management Recovery model: Personal Plan of Care    Patient completed Personal Plan of Care, identifying reasons for hospitalization and goals for discharge.     Survey provided.

## 2019-04-19 NOTE — PROGRESS NOTES
Pt seemed to have a good evening. Pt said that she is ready to go home tomorrow. Pt said that her boyfriend will come and pick her up.  Pt wanted a face shaver to shave her face so she could look beautiful for her  tomorrow. Pt scaled her depression to 2 as well as anxiety.   Pt said that her boyfriend and family are her main support and feels ok to resume her normal activities.

## 2019-04-19 NOTE — PROGRESS NOTES
Initiated and actively participated in morning Creative Expressions group. Bright, pleasant and social. Set self up with task and problem solved and planned new task. Good attention to details and planning. Recalled steps of new task. Assertively asked for assistance on another task. Futuristic in her plans and goals for discharge.

## 2019-04-19 NOTE — PROGRESS NOTES
Pt demonstrated insight and a clear plan for discharge and longterm safety for herself and her family.         04/19/19 1130   Dance Movement Therapy   Type of Intervention structured groups   Response participates, initiates socially appropriate   Hours 1

## 2019-04-19 NOTE — PROGRESS NOTES
Initiated and actively participated in 2 of 2 OT groups offered. Bright and cheerful. Social with peers and sought out interaction. Anxious, however, able to verbalize feelings and stressors in structured supportive groups. Futuristic in conversation, goals and plans. Noted interest in out pt. groups and therapy. Verbalized difficulty of being alone and having little time with her  due to the demands of his job. Was open to suggestions and ideas of ways to structure her time.

## 2019-04-19 NOTE — PROGRESS NOTES
Participated in Music Therapy group with focus on mood elevation, validation and decreasing anxiety and improved group cohesiveness. Engaged and cooperative in music listening interventions.   Showed progress in session goals.  Sang along with the music.  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

## 2019-04-22 ENCOUNTER — TELEPHONE (OUTPATIENT)
Dept: FAMILY MEDICINE | Facility: CLINIC | Age: 44
End: 2019-04-22

## 2019-04-22 ENCOUNTER — MYC MEDICAL ADVICE (OUTPATIENT)
Dept: PHARMACY | Facility: CLINIC | Age: 44
End: 2019-04-22

## 2019-04-22 ENCOUNTER — ALLIED HEALTH/NURSE VISIT (OUTPATIENT)
Dept: PHARMACY | Facility: CLINIC | Age: 44
End: 2019-04-22
Payer: COMMERCIAL

## 2019-04-22 DIAGNOSIS — J30.9 ALLERGIC RHINITIS, UNSPECIFIED SEASONALITY, UNSPECIFIED TRIGGER: ICD-10-CM

## 2019-04-22 DIAGNOSIS — F32.3 MAJOR DEPRESSIVE DISORDER WITH PSYCHOTIC FEATURES (H): Primary | ICD-10-CM

## 2019-04-22 PROCEDURE — 99605 MTMS BY PHARM NP 15 MIN: CPT | Performed by: PHARMACIST

## 2019-04-22 PROCEDURE — 99607 MTMS BY PHARM ADDL 15 MIN: CPT | Performed by: PHARMACIST

## 2019-04-22 NOTE — TELEPHONE ENCOUNTER
"4/19/19   Discharge Plan:      Health Care Follow-up Appointments:   If you cannot make any of these appointments please call to cancel at least 24 hours in advance.  Therapy Appointment Date/Time: Tuesday 4/23 at 2:30 pm          Therapist: Estephanie Lua  Day Track DBT intake Appointment Date/Time: Tuesday 4/30 at 9 am     Psychiatry appointment Date/Time: Monday 4/29 at 11:40 am       Provider: Dr. Lauren Luna  [facility]  [address], [place], [address]  Phone:  [phone]   Fax: [phone]     Attestation:  The patient has been seen and evaluated by me,  Jarad Martinez MD      On the day of discharge, I saw the patient and performed the above examination, reviewed discharge medications, reviewed follow-up plan, and assessed safety for discharge. I spent greater than 30 minutes on these tasks.       Hospital/TCU/ED for chronic condition Discharge Protocol    \"Hi, my name is Melani Abbasi, a registered nurse, and I am calling from [facility].  I am calling to follow up and see how things are going for you after your recent emergency visit/hospital/TCU stay.\"    Tell me how you are doing now that you are home?\" doing fine      Discharge Instructions    \"Let's review your discharge instructions.  What is/are the follow-up recommendations?  Pt. Response: see above    \"Has an appointment with your primary care provider been scheduled?\"   will call if needed after seeing mental health dept    \"When you see the provider, I would recommend that you bring your medications with you.\"    Medications    \"Tell me what changed about your medicines when you discharged?\"    Changes to chronic meds?    2 or more - Epic MTM referral needed.  Has upcoming appt with MTM    \"What questions do you have about your medications?\"    None     New diagnoses of heart failure, COPD, diabetes, or MI?    No              Medication reconciliation completed? Yes  Was MTM referral placed (*Make sure to put " "transitions as reason for referral)?   No    Call Summary    \"What questions or concerns do you have about your recent visit and your follow-up care?\"     none    \"If you have questions or things don't continue to improve, we encourage you contact us through the main clinic number (give number).  Even if the clinic is not open, triage nurses are available 24/7 to help you.     We would like you to know that our clinic has extended hours (provide information).  We also have urgent care (provide details on closest location and hours/contact info)\"      \"Thank you for your time and take care!\"    Melani Abbasi RN         "

## 2019-04-22 NOTE — PROGRESS NOTES
SUBJECTIVE/OBJECTIVE:                Prasanth Coy is a 44 year old female called for a transitions of care visit.  She was discharged from [facility] on 4/12/19 for anxiety and depression.  Pharmacist Natividad Robins also present on the phone call.     Chief Complaint: no concerns.  Personal Healthcare Goals: Start exercising     Allergies/ADRs: Reviewed in Epic  Tobacco: No tobacco use   Alcohol: Less than 1 beverages / week  Caffeine: 3-5 cups/day of coffee, stops when feels jittery  Activity: none, but planning to restart - goal is 3x/week  PMH: Reviewed in Epic    Medication Adherence/Access:  No issues found  Patient takes medications 2 time(s) per day.   Keeps by nightstand.     Depression with hallucinations:  Current medications include: Mirtazapine 15mg once daily and risperidone 0.5 mg twice daily. She stopped Lexapro as directed. Feels that overall her mood has improved, only concern she has is ongoing irritability. Current depression symptoms include: Sometimes irritable, trying to ignore auditory hallucinations.  Pt states she believes she can differentiate whether or not she is having a hallucination and has been easier to ignore them since restarting her medication. Denies adverse effects but does feel more tired during the day.  Psychiatry appt scheduled for next week.   Also sees a regular therapist.  PHQ-9 SCORE 8/8/2018 3/12/2019   PHQ-9 Total Score 8 4     Allergic rhinitis: Medications include albuterol inhaler and fluticasone nasal spray - currently not needing to use either.    Today's Vitals: There were no vitals taken for this visit. - phone call    ASSESSMENT:                 Current medications were reviewed today.      Medication Adherence: good, no issues identified.     Depression with hallucinations: Improved. Education provided on purpose of each medication and encouraged adherence.  Also encouraged increased exercise and reduction of coffee.       PLAN:                  Post Discharge  Medication Reconciliation Status: discharge medications reconciled, continue medications without change.      No medication changes    I spent 30 minutes with this patient today. All changes were made via collaborative practice agreement with Tsering Lagos. A copy of the visit note was provided to the patient's primary care provider.    Will follow up as needed.    The patient was sent via [employer] a summary of these recommendations as an after visit summary.    Gisselle Munoz, PharmD, BCACP

## 2019-04-22 NOTE — PATIENT INSTRUCTIONS
Recommendations from today's MTM visit:                                                    MTM (medication therapy management) is a service provided by a clinical pharmacist designed to help you get the most of out of your medicines.   Today we reviewed what your medicines are for, how to know if they are working, that your medicines are safe and how to make your medicine regimen as easy as possible.     I'm glad you are feeling better!    Please continue taking your medicine.     - Risperidone is for mood and hallucinations   - mirtazapine is for depression and sleep    Here are the follow-upappointments that were set up in the hospital:    Health Care Follow-up Appointments:   If you cannot make any of these appointments please call to cancel at least 24 hours in advance.  Therapy Appointment Date/Time: Tuesday 4/23 at 2:30 pm          Therapist: Estephanie Aguiar Track DBT intake Appointment Date/Time: Tuesday 4/30 at 9 am     Psychiatry appointment Date/Time: Monday 4/29 at 11:40 am       Provider: Dr. Lauren Luna  [facility]  [address] , [place], [address]  Phone:  [phone]   Fax: [phone]      Next MTM visit: as needed    To schedule another MTM appointment, please call the clinic directly or you may call the MTM scheduling line at [phone] or toll-free at [phone].     My Clinical Pharmacist's contact information:                                                      It was a pleasure talking with you today!  Please feel free to contact me with any questions or concerns you have.      Gisselle Munoz, [place], [facility]   [phone]    You may receive a survey about the MTM services you received by email and/or US Mail.  I would appreciate your feedback to help me serve you better in the future. Your comments will be anonymous.

## 2019-04-22 NOTE — TELEPHONE ENCOUNTER
Please call patient for Inpatient Discharge f/u 04/19/19. Depression, Severe With Psychosis. Ruth Behrens.

## 2020-02-07 ENCOUNTER — TELEPHONE (OUTPATIENT)
Dept: FAMILY MEDICINE | Facility: CLINIC | Age: 45
End: 2020-02-07

## 2020-02-07 NOTE — TELEPHONE ENCOUNTER
Summary:    Patient is due/failing the following:   ACT    Reviewed:  [x] CARE EVERYWHERE  [x] LAST OV NOTE INCLUDING ENDO  [x] FYI TAB  [x] MYCHART ACTIVE?  [x] LAST PANEL ENCOUNTER  [x] FUTURE APPTS  [x] IMMUNIZATIONS          Action needed:   Patient needs to do ACT. and Patient needs to do PHQ9.    Type of outreach:    Sent [employer] message. and pt has future appt 02/24/2020                                                                               Shannan Paulson/FELIX  [place]

## 2020-03-11 ENCOUNTER — HEALTH MAINTENANCE LETTER (OUTPATIENT)
Age: 45
End: 2020-03-11

## 2020-12-27 ENCOUNTER — HEALTH MAINTENANCE LETTER (OUTPATIENT)
Age: 45
End: 2020-12-27

## 2021-10-09 ENCOUNTER — HEALTH MAINTENANCE LETTER (OUTPATIENT)
Age: 46
End: 2021-10-09

## 2021-10-15 NOTE — PROVIDER NOTIFICATION
Pt states Zyprexa 10 mg po PRN she took last night for auditory/visual hallucinations, paranoia, agitation was helpful and she feels better today in that regard, but she has been observed to be sedated much of the day today. Dr. Ivy notified. She ordered Zyprexa 10 mg po PRN q 2 hrs be decreased to 5 mg po PRN q 2 hrs. Pt notified and verbalizes understanding.   Statement Selected

## 2022-01-29 ENCOUNTER — HEALTH MAINTENANCE LETTER (OUTPATIENT)
Age: 47
End: 2022-01-29

## 2022-03-26 ENCOUNTER — HEALTH MAINTENANCE LETTER (OUTPATIENT)
Age: 47
End: 2022-03-26

## 2022-09-17 ENCOUNTER — HEALTH MAINTENANCE LETTER (OUTPATIENT)
Age: 47
End: 2022-09-17

## 2023-01-24 ENCOUNTER — HOSPITAL ENCOUNTER (EMERGENCY)
Facility: CLINIC | Age: 48
Discharge: HOME | End: 2023-01-24
Attending: EMERGENCY MEDICINE | Admitting: EMERGENCY MEDICINE
Payer: COMMERCIAL

## 2023-01-24 VITALS
RESPIRATION RATE: 16 BRPM | DIASTOLIC BLOOD PRESSURE: 104 MMHG | SYSTOLIC BLOOD PRESSURE: 139 MMHG | TEMPERATURE: 98.9 F | OXYGEN SATURATION: 98 % | WEIGHT: 185 LBS | BODY MASS INDEX: 29.73 KG/M2 | HEART RATE: 117 BPM | HEIGHT: 66 IN

## 2023-01-24 DIAGNOSIS — F41.9 ANXIETY: ICD-10-CM

## 2023-01-24 DIAGNOSIS — F32.A DEPRESSION, UNSPECIFIED DEPRESSION TYPE: ICD-10-CM

## 2023-01-24 PROCEDURE — 99285 EMERGENCY DEPT VISIT HI MDM: CPT | Mod: 25 | Performed by: EMERGENCY MEDICINE

## 2023-01-24 PROCEDURE — 99284 EMERGENCY DEPT VISIT MOD MDM: CPT | Performed by: EMERGENCY MEDICINE

## 2023-01-24 PROCEDURE — 90791 PSYCH DIAGNOSTIC EVALUATION: CPT

## 2023-01-24 RX ORDER — LORAZEPAM 0.5 MG/1
0.5 TABLET ORAL EVERY 8 HOURS PRN
Qty: 8 TABLET | Refills: 0 | Status: SHIPPED | OUTPATIENT
Start: 2023-01-24 | End: 2023-03-02

## 2023-01-24 ASSESSMENT — ACTIVITIES OF DAILY LIVING (ADL)
ADLS_ACUITY_SCORE: 35

## 2023-01-24 ASSESSMENT — COLUMBIA-SUICIDE SEVERITY RATING SCALE - C-SSRS
TOTAL  NUMBER OF ABORTED OR SELF INTERRUPTED ATTEMPTS LIFETIME: NO
ATTEMPT LIFETIME: NO
TOTAL  NUMBER OF INTERRUPTED ATTEMPTS LIFETIME: NO
6. HAVE YOU EVER DONE ANYTHING, STARTED TO DO ANYTHING, OR PREPARED TO DO ANYTHING TO END YOUR LIFE?: NO
4. HAVE YOU HAD THESE THOUGHTS AND HAD SOME INTENTION OF ACTING ON THEM?: YES
3. HAVE YOU BEEN THINKING ABOUT HOW YOU MIGHT KILL YOURSELF?: YES
1. HAVE YOU WISHED YOU WERE DEAD OR WISHED YOU COULD GO TO SLEEP AND NOT WAKE UP?: YES
5. HAVE YOU STARTED TO WORK OUT OR WORKED OUT THE DETAILS OF HOW TO KILL YOURSELF? DO YOU INTEND TO CARRY OUT THIS PLAN?: NO
REASONS FOR IDEATION LIFETIME: MOSTLY TO END OR STOP THE PAIN (YOU COULDN'T GO ON LIVING WITH THE PAIN OR HOW YOU WERE FEELING)
2. HAVE YOU ACTUALLY HAD ANY THOUGHTS OF KILLING YOURSELF?: YES
1. IN THE PAST MONTH, HAVE YOU WISHED YOU WERE DEAD OR WISHED YOU COULD GO TO SLEEP AND NOT WAKE UP?: YES
5. HAVE YOU STARTED TO WORK OUT OR WORKED OUT THE DETAILS OF HOW TO KILL YOURSELF? DO YOU INTEND TO CARRY OUT THIS PLAN?: NO
2. HAVE YOU ACTUALLY HAD ANY THOUGHTS OF KILLING YOURSELF?: YES
4. HAVE YOU HAD THESE THOUGHTS AND HAD SOME INTENTION OF ACTING ON THEM?: YES
REASONS FOR IDEATION PAST MONTH: MOSTLY TO END OR STOP THE PAIN (YOU COULDN'T GO ON LIVING WITH THE PAIN OR HOW YOU WERE FEELING)

## 2023-01-24 NOTE — DISCHARGE INSTRUCTIONS
Appointments:     Date: Thursday, 1/26/2023    Time: 10:00 am - 11:00 am  Provider: Shahnaz Coffey  Location: [facility], [address] [place] [address]  Phone: [phone]  Type: Therapy - Initial (In-Person)    Date: Wednesday, 2/1/2023    Time: 3:00 pm - 4:00 pm  Provider: Dea DALE  CNP  Location: [facility], [address] [place] [address] [address] [facility] [address], [place] [address]  Phone: [phone]  Type: Medication Mgmt - Initial (In-Person)  Patient Instructions:  Please arrive 15 minutes before the scheduled appointment and bring your DL/ID and insurance card.        Please follow up as recommended.     Return to the ER if any other problems/concerns.     Aftercare Plan  If I am feeling unsafe or I am in a crisis, I will:   Contact my established care providers   Call the National Suicide Prevention Lifeline: 988  Go to the nearest emergency room   Call 911     Warning signs that I or other people might notice when a crisis is developing for me: Crying spells, yelling, feeling unusually irritable, isolating to self.    Things I am able to do on my own to cope or help me feel better: Go for a walk, take a shower, take a nap, sing     Things that I am able to do with others to cope or help me better: Cook a meal with significant other and/or family, talk to a friend, spend time with my daughter, volunteer in my community, play a game with family.    Things I can use or do for distraction: Watch a movie, journal, practice breathing exercises, mindfulness, yoga.    Changes I can make to support my mental health and wellness: Eat adequate meals daily, drink enough water, maintain hygiene, take my medications as prescribed.    People in my life that I can ask for help: My significant other, [place], my daughter, my family, my therapist, [facility] crisis number     Your [facility] has a mental health crisis team you can call 24/7: [facility]   [phone]    Additional resources and information: Local choirs to join in [facility]: [place] Evelyn  Community services/non-profits  [address], [place]   [phone]    [facility] Chorale  [address], [place]   [phone]    [employer] Mixed Chorus  Facebook (59)   Community services/non-profits  [address], [place]   [phone]    Volunteer Opportunities in [facility]:    -Mongolian-language admin  [employer] provides basic needs and social service programs in the [facility] area. Speak with clients by phone, help them connect to services and make appointments. Three- to four-hour shifts weekdays. [employer]    -[employer]   Help the [employer] in [place] assist guests; accept, organize and price donations; create and arrange displays, and perform other duties as assigned. Training provided. [employer]    -Shop for seniors  Assist Help at Your Door to keep aging adults independent. Grocery shop for three hours twice a month or on a weekly basis. Shoppers needed in [facility]. 6 a.m.-9 a.m. Six-month time commitment required. Training provided. [employer]    -[place] for Kids  The [employer]'s annual [place] for Kids campaign seeks cold-weather gear for [facility] children. Due to the impact of inflation on rising prices for food, gas, rent and utilities, the demand for winter wear has increased significantly. The greatest need is for coats for toddlers through kids in sixth grade, but new coats are needed for children and youth of all ages, as well as hats, mittens, gloves, boots and snow pants. More at [employer]Y-Clients.org/[facility]/coats-for-kids    Crisis Lines  Crisis Text Line  Text 068344  You will be connected with a trained live crisis counselor to provide support.    Por espanol, texto  NEFTALI a 111480 o texto a 442-AYUDAME en WhatsApp    The [employer] (LGBTQ Youth Crisis  "Line)  5.705.079.1381  text START to 774-082    Community Resources  Fast Tracker  Linking people to mental health and substance use disorder resources  [employer]     [place] Mental [facility] Warm Line  Peer to peer support  Monday thru Saturday, 12 pm to 10 pm  203.389.9304 or 3.134.398.5320  Text \"Support\" to 62650    National [place] on Mental Illness (DARIEL)  572.064.9278 or 1.888.DARIEL.HELPS      Mental Health Apps  My3  https://[employer].org/    VirtualHopeBox  https://Vinny/apps/virtual-hope-box/      Additional Information  Today you were seen by a licensed mental health professional through Triage and Transition services, [facility] ([facility])  for a crisis assessment in the Emergency Department at [facility].  It is recommended that you follow up with your established providers (psychiatrist, mental health therapist, and/or primary care doctor - as relevant) as soon as possible. Coordinators from [facility] will be calling you in the next 24-48 hours to ensure that you have the resources you need.  You can also contact [facility] coordinators directly at [phone]. You may have been scheduled for or offered an appointment with a mental health provider. [facility] maintains an extensive network of licensed behavioral health providers to connect patients with the services they need.  We do not charge providers a fee to participate in our referral network.  We match patients with providers based on a patient's specific needs, insurance coverage, and location.  Our first effort will be to refer you to a provider within your care system, and will utilize providers outside your care system as needed.      Please follow up with your scheduled appointments of therapy and medication management.   "

## 2023-01-24 NOTE — ED TRIAGE NOTES
"Triage Assessment & Note:    BP (!) 139/104   Pulse 117   Temp 98.9  F (37.2  C) (Temporal)   Resp 16   Ht 1.676 m (5' 6\")   Wt 83.9 kg (185 lb)   SpO2 98%   BMI 29.86 kg/m        Patient presents with: Pt comes ambulatory to triage with reports of suicidal thoughts without plan. No reports of fever, cough, SOB, CP, or travel.     Home Treatments/Remedies: Home medications    Febrile / Afebrile: afebrile    Duration of C/o: ongoing issues    Britt Sharp RN  January 24, 2023            "

## 2023-01-24 NOTE — CONSULTS
"Diagnostic Evaluation Consultation  Crisis Assessment    Patient was assessed: [employer]  Patient location: [facility] ED  Was a release of information signed: No. Reason: no outpatient providers      Referral Data and Chief Complaint  Raymon is a 47 year old, who uses she/her pronouns, and presents to the ED via EMS. Patient is referred to the ED by self and family/friends. Patient is presenting to the ED for the following concerns: suicidal ideation and anxiety.      Informed Consent and Assessment Methods     Patient is her own guardian. Writer met with patient and explained the crisis assessment process, including applicable information disclosures and limits to confidentiality, assessed understanding of the process, and obtained consent to proceed with the assessment. Patient was observed to be able to participate in the assessment as evidenced by engagement throughout assessment. Assessment methods included conducting a formal interview with patient, review of medical records, collaboration with medical staff, and obtaining relevant collateral information from family and community providers when available.    Over the course of this crisis assessment provided reassurance, offered validation, engaged patient in problem solving and disposition planning and worked with patient on safety and aftercare planning. Patient's response to interventions was positive and engaging.     Summary of Patient Situation  The patient presented to [facility] emergency department via EMS for suicidal ideation and anxiety.  Patient was present with her significant other, Newton. The patient's chief complaint is feelings of \"guilt, remorse, mistreating people\", and experiencing \"ups and downs\" per her report.  The patient reported her symptoms of depression and anxiety worsening over the last 3 to 4 days, reporting she has been crying more frequently, yelling, and feeling more irritable.  She denied there being any significant " "event or stressor that led to today but reported that everything \"just came down all at once\". The pt frequently mentions to writer that she feels she \"let everybody down\" however was not specific in what she did to let people around her down. Pt was able to later identify in assessment that her feelings of guilt are mostly due to her depression, anxiety, and social isolation.    The patient reported she called various family members and confided in her significant other the last 3 days regarding feeling hopeless and endorsing passive SI. The patient reported she called the [facility] crisis line today who recommended she be seen at an emergency room.     The patient is currently denying SI / HI and visual hallucinations. Denies history of SIB. When writer asked about auditory hallucinations, patient reported she \"hears her own voice every day\" and has for the last 3 years since she was last admitted in 2019 for inpatient mental health.  When writer asked patient to clarify if it was her own voice or background voices/music patient then stated \"it is background voices/music\".  The patient denied these being command hallucinations and overall reported they are not intrusive.    Brief Psychosocial History  The patient currently lives in [place] with her significant other. Reported her mother and father live in [place], a sister who lives in [place], a brother who lives in [place], [place], and reported she has 1 daughter. The patient reported she is currently unemployed; stated her significant other provides for her financially.  She reported her hobbies are spending time with family and singing.  She reported her coping skills are taking a shower, taking a nap, yelling, and singing.  She denied any history of legal issues.    Significant Clinical History  Patient reported she has a history of diagnoses of depression and anxiety. Denies any history of substance use. She reported her last ED visit / " "hospitalization being in April 2019 where she was admitted for 1 week for depression with psychosis at [place].  She reported being discharged with medications for mental health and stated these were helpful.  She reported at some point she went off of the medications and reported the \"voices\" continued and are still present to this day.  She denied these being command hallucinations and reported they are not intrusive.  She reported her only outpatient provider at this time is a PCP, with a history of seeing a therapist at [facility] over the phone.  Reported she would like to see a therapist in- person at discharge and go back on medications.  Patient does not have a history of civil commitments and did not discuss trauma history.    Collateral Information  Patient's significant other, Newton was present and assessment.  Newton stated the patient's behavior in the last few weeks has been increasingly anxious and irritable (crying spells, shortness of breath, mimicking others). He reported the patient has a \"singing ann-marie\" on her phone that she uses for hours a day and gets upset and yells when people criticize her performance.  He reported she has a great relationship with her family and is unsure where all of this guilt / shame comes from with her.  When writer asked Newton what he feels the patient needs he said \"to get back on medications and see a therapist in person\".  Newton reported the patient has made passive comments about suicide the last few days without a plan or intent, which is not like her.  He stated he does not have concern of the patient acting on her thoughts and there are no means in the home.  Newton reported he does not have any concern of the patient engaging in SIB, and reported the patient has never consumed substances.     Risk Assessment  Harper Suicide Severity Rating Scale Full Clinical Version: 1/24/23  Suicidal Ideation  1. Wish to be Dead (Lifetime): Yes  1. Wish to be Dead (Past 1 Month): " Yes  2. Non-Specific Active Suicidal Thoughts (Lifetime): Yes  2. Non-Specific Active Suicidal Thoughts (Past 1 Month): Yes  3. Active Suicidal Ideation with any Methods (Not Plan) Without Intent to Act (Lifetime): Yes  3. Active Suicidal Ideation with any Methods (Not Plan) Without Intent to Act (Past 1 Month): Yes  4. Active Suicidal Ideation with Some Intent to Act, Without Specific Plan (Lifetime): Yes  4. Active Suicidal Ideation with Some Intent to Act, Without Specific Plan (Past 1 Month): Yes  5. Active Suicidal Ideation with Specific Plan and Intent (Lifetime): No  5. Active Suicidal Ideation with Specific Plan and Intent (Past 1 Month): No  Intensity of Ideation  Most Severe Ideation Rating (Lifetime): 2  Most Severe Ideation Rating (Past 1 Month): 3  Frequency (Lifetime): Less than once a week  Duration (Lifetime): Fleeting, few seconds or minutes  Controllability (Lifetime): Easily able to control thoughts  Controllability (Past 1 Month): Can control thoughts with little difficulty  Deterrents (Lifetime): Deterrents definitely stopped you from attempting suicide  Deterrents (Past 1 Month): Deterrents definitely stopped you from attempting suicide  Reasons for Ideation (Lifetime): Mostly to end or stop the pain (You couldn't go on living with the pain or how you were feeling)  Reasons for Ideation (Past 1 Month): Mostly to end or stop the pain (You couldn't go on living with the pain or how you were feeling)  Suicidal Behavior  Actual Attempt (Lifetime): No  Has subject engaged in non-suicidal self-injurious behavior? (Lifetime): No  Interrupted Attempts (Lifetime): No  Aborted or Self-Interrupted Attempt (Lifetime): No  Preparatory Acts or Behavior (Lifetime): No  C-SSRS Risk (Lifetime/Recent)  Calculated C-SSRS Risk Score (Lifetime/Recent): High Risk    Validity of evaluation is impacted by presenting factors during interview: The patient does not have a history of suicide attempts or SIB, and reported  her thoughts of suicide the last few days were passive and fleeting.  She reported that each time she had thoughts that she wanted to be dead she went to family and eventually a crisis line.  The patient is denying SI and SIB urges at this time.  Environmental or Psychosocial Events: helplessness/hopelessness, neither working nor attending school and social isolation  Chronic Risk Factors: history of psychiatric hospitalization   Warning Signs: talking or writing about death, dying, or suicide, hopelessness, feeling trapped, like there is no way out, anxiety, agitation, unable to sleep, sleeping all the time and dramatic changes in mood  Protective Factors: strong bond to family unit, community support, or employment, intact marriage or domestic partnership, responsibilities and duties to others, including pets and children, lives in a responsibly safe and stable environment, good treatment engagement, help seeking, good impulse control, good problem-solving, coping, and conflict resolution skills, sense of belonging, sense of self-efficacy and/or positive self-esteem and cultural, spiritual , or Zoroastrian beliefs associated with meaning and value in life  Interpretation of Risk Scoring, Risk Mitigation Interventions and Safety Plan:  The patient scores ultimately as a low risk in writer's opinion due to no history of attempts and her ability to contract to safety and ability to participate in care plan with writer.  Patient reports that the thoughts of actually hurting herself or others ultimately scares her.    Does the patient have thoughts of harming others? No     Is the patient engaging in sexually inappropriate behavior?  no      Current Substance Abuse     Is there recent substance abuse? no     Was a urine drug screen or blood alcohol level obtained: No       Mental Status Exam     Affect: Blunted   Appearance: Appropriate    Attention Span/Concentration: Attentive  Eye Contact: Engaged   Fund of Knowledge:  Appropriate    Language /Speech Content: Fluent   Language /Speech Volume: Soft    Language /Speech Rate/Productions: Articulate    Recent Memory: Intact   Remote Memory: Intact   Mood: Anxious, Depressed and Sad    Orientation to Person: Yes    Orientation to Place: Yes   Orientation to Time of Day: Yes    Orientation to Date: Yes    Situation (Do they understand why they are here?): Yes    Psychomotor Behavior: Normal    Thought Content: Clear   Thought Form: Intact      History of commitment: No  Medication    Psychotropic medications: No  Medication changes made in the last two weeks: No      Current Care Team    Primary Care Provider: Tsering Lagos MD  Psychiatrist: No  Therapist: Reported she last saw a therapist through [facility] via telephone. Only able to recall first name was Aleja.  : No     CTSS or ARMHS: No  ACT Team: No  Other: No    Diagnosis    311 (F32.9) Unspecified Depressive Disorder - by history  300.02 (F41.1) Generalized Anxiety Disorder - by history     Clinical Summary and Substantiation of Recommendations    After therapeutic assessment, intervention and aftercare planning by ED care team and LMHP and in consultation with attending provider, the patient's circumstances and mental state were appropriate for outpatient management. It is the recommendation of this clinician that pt discharge with OP MH support. A this time the pt is not presenting as an acute risk to self or others due to the following factors: ability to contract for safety, declining SI / HI, ability to engage in safety planning, willing and motivated to work with outpatient providers recommended by healthcare professionals.   Disposition    Recommended disposition: Individual Therapy and Medication Management. Patient and patient's significant other were in agreement of this disposition.      Reviewed case and recommendations with attending provider. Attending  Name: MD Chaney       Attending concurs with disposition: Yes       Patient concurs with disposition: Yes       Guardian concurs with disposition: NA      Final disposition: Individual therapy  and Medication management.     Inpatient Details (if applicable):   Is patient admitted voluntarily:Yes    Outpatient Details (if applicable):   Aftercare plan and appointments placed in the AVS and provided to patient: Yes. Given to patient by RN    Was lethal means counseling provided as a part of aftercare planning? Yes - describe pt's significant other reported there is not access to lethal means in their household.       Assessment Details    Patient interview started at: 4:52PM and completed at: 5:34PM.     Total duration spent on the patient case in minutes: 1.75 hrs      CPT code(s) utilized: 55393 - Psychotherapy for Crisis - 60 (30-74*) min and 47135 - Psychotherapy for Crisis (Each additional 30 minutes) - 30 min      TO Zepeda, LGSW, Psychotherapist  DEC - Triage & Transition Services  Callback: [phone]      Aftercare Plan  If I am feeling unsafe or I am in a crisis, I will:   Contact my established care providers   Call the National Suicide Prevention Lifeline: 988  Go to the nearest emergency room   Call 911     Warning signs that I or other people might notice when a crisis is developing for me: Crying spells, yelling, feeling unusually irritable, isolating to self.    Things I am able to do on my own to cope or help me feel better: Go for a walk, take a shower, take a nap, sing     Things that I am able to do with others to cope or help me better: Cook a meal with significant other and/or family, talk to a friend, spend time with my daughter, volunteer in my community, play a game with family.    Things I can use or do for distraction: Watch a movie, journal, practice breathing exercises, mindfulness, yoga.    Changes I can make to support my mental health and wellness: Eat adequate meals  daily, drink enough water, maintain hygiene, take my medications as prescribed.    People in my life that I can ask for help: My significant other, Newton, my daughter, my family, my therapist, [facility] crisis number     Your county has a mental health crisis team you can call 24/7: [facility] Crisis  [phone]    Additional resources and information: Local choirs to join in [facility]: [place] [email or web address]  Community services/non-profits  [address], [place]   [phone]    [facility] Chorale  [address], [place]   [phone]    [employer] Mixed Chorus  Facebook (59)   Community services/non-profits  [address], [place]   [phone]    Volunteer Opportunities in [facility]:    -Greenlandic-language admin  [employer] provides basic needs and social service programs in the [facility] area. Speak with clients by phone, help them connect to services and make appointments. Three- to four-hour shifts weekdays. [employer]    -PlanStan worker  Help the [facility] in [place] assist guests; accept, organize and price donations; create and arrange displays, and perform other duties as assigned. Training provided. [employer]    -Shop for seniors  Assist Help at Your Door to keep aging adults independent. Grocery shop for three hours twice a month or on a weekly basis. Shoppers needed in [facility]. 6 a.m.-9 a.m. Six-month time commitment required. Training provided. HelpINFIMET.org    -[employer] for Kids  [employer]'s annual [employer] Kids campaign seeks cold-weather gear for [facility] children. Due to the impact of inflation on rising prices for food, gas, rent and utilities, the demand for winter wear has increased significantly. The greatest need is for coats for toddlers through kids in sixth grade, but new coats are needed for children and youth of all ages, as well as hats, mittens, gloves, boots and snow pants. More  "at [facility].InvoiceSharingNort[facility].org/[facility]/coats-for-kids    Crisis Lines  Crisis Text Line  Text 951391  You will be connected with a trained live crisis counselor to provide support.    Por navjot, texto  NEFTALI a 379202 o texto a 442-AYUDAME en WhatsApp    The [employer] (LGBTQ Youth Crisis Line)  9.514.205.6323  text START to 383-343    [employer]  Fast Tracker  Linking people to mental health and substance use disorder resources  [employer].PassionTag     [place] Mental Health Warm Line  Peer to peer support  Monday thru Saturday, 12 pm to 10 pm  555.240.7891 or 8.089.315.5435  Text \"Support\" to 47321    National Green on Mental Illness (DARIEL)  317.960.0547 or 1.888.DARIEL.HELPS      Mental Health Apps  My3  https://MailLift.PassionTag/    VirtualHopeBox  https://[employer]/apps/virtual-hope-box/      Additional Information  Today you were seen by a licensed mental health professional through Triage and Transition services, Behavioral Healthcare Providers ([facility])  for a crisis assessment in the Emergency Department at [facility].  It is recommended that you follow up with your established providers (psychiatrist, mental health therapist, and/or primary care doctor - as relevant) as soon as possible. Coordinators from [facility] will be calling you in the next 24-48 hours to ensure that you have the resources you need.  You can also contact [facility] coordinators directly at [phone]. You may have been scheduled for or offered an appointment with a mental health provider. [facility] maintains an extensive network of licensed behavioral health providers to connect patients with the services they need.  We do not charge providers a fee to participate in our referral network.  We match patients with providers based on a patient's specific needs, insurance coverage, and location.  Our first effort will be to refer you to a provider within your care system, and will utilize providers outside your care " system as needed.

## 2023-01-24 NOTE — ED PROVIDER NOTES
"    [facility]ERGENCY DE[facility] ([facility])    23       ED PROVIDER NOTE        History   No chief complaint on file.    The history is provided by the patient and medical records.     Prasanth Nova is a 47 year old female with past medical history significant for MDD with psychotic features, anxiety, and asthma who presents to the ED accompanied by her boyfriend reporting suicidal ideation.  The patient reports feeling like she \"disappointed everyone\".  She thinks she has been \"terrible\" to her family and friends which she has realized more in the past several days. She reports having thoughts to hurt herself earlier today but now says she does not want to hurt herself because she does not want to hurt anyone else.  She says she used to see a therapist but does not anymore.  She denies being on any medication.  She denies chance of pregnancy.  She denies any substance abuse.  She denies physical pain.  She was admitted here 3 years ago for mental health.    Per chart review, patient was admitted to [facility] 2019 - 2019 to station 32 as a voluntary patient.  She presented severely depressed with an almost delusional level of guilt.  She agreed to restart medication she had previously been stabilized on.  She was discharged with Remeron and Risperdal.    Past Medical History  Past Medical History:   Diagnosis Date     Anxiety      Depressive disorder      Uncomplicated asthma      Past Surgical History:   Procedure Laterality Date      SECTION       ORTHOPEDIC SURGERY       fluticasone (FLONASE) 50 MCG/ACT nasal spray  mirtazapine (REMERON) 15 MG tablet  risperiDONE (RISPERDAL) 0.5 MG tablet  VENTOLIN  (90 Base) MCG/ACT inhaler  VENTOLIN  (90 Base) MCG/ACT inhaler      Allergies   Allergen Reactions     Nuts      Tight throat     Shrimp      Tight throat     Family History  Family History   Problem Relation Age of Onset     Depression Mother      Depression Sister  "     Social History   Social History     Tobacco Use     Smoking status: Never     Smokeless tobacco: Never   Substance Use Topics     Alcohol use: Yes     Comment: occasionally     Drug use: No      Past medical history, past surgical history, medications, allergies, family history, and social history were reviewed with the patient. No additional pertinent items.      A medically appropriate review of systems was performed with pertinent positives and negatives noted in the HPI, and all other systems negative.    Physical Exam      Physical Exam  Constitutional:       Appearance: She is well-developed.   HENT:      Head: Normocephalic and atraumatic.   Cardiovascular:      Rate and Rhythm: Normal rate and regular rhythm.      Heart sounds: Normal heart sounds.   Pulmonary:      Effort: Pulmonary effort is normal. No respiratory distress.      Breath sounds: Normal breath sounds.   Abdominal:      General: There is no distension.      Palpations: Abdomen is soft.      Tenderness: There is no abdominal tenderness. There is no rebound.   Musculoskeletal:         General: No tenderness.      Cervical back: Normal range of motion.   Skin:     General: Skin is warm and dry.   Neurological:      General: No focal deficit present.      Mental Status: She is alert and oriented to person, place, and time.   Psychiatric:         Behavior: Behavior normal.         Thought Content: Thought content normal.      Comments: Tearful, anxious; no SI or HI           ED Course, Procedures, & Data     2:09 PM  The patient was seen and examined by Karen Baron MD in Room ED08.     Procedures                      No results found for any visits on 01/24/23.  Medications - No data to display  Labs Ordered and Resulted from Time of ED Arrival to Time of ED Departure - No data to display  No orders to display          Medical Decision Making  The patient's presentation is strongly suggestive of a chronic illness severe exacerbation,  progression, or side effect of treatment.    The patient's evaluation involved:  discussion of management or test interpretation with another health professional (see separate area of note for details)    The patient's management involved prescription drug management (including medications given in the ED).      Assessment & Plan    Patient is a 47-year-old female with a history of anxiety and depression who presents to the ER due to worsening symptoms.  Patient has been upset about how she treated her family in the past.  Patient is tearful and remorseful.  No history of substance abuse.  Plan will be for the patient be evaluated by the behavioral health .  At this time patient is not acutely suicidal.  She has not seen a therapist in about 2 years.  Patient likely can be discharged home with outpatient mental health follow-up.  Awaiting behavioral health assessment.  Patient will be signed out to the evening doctor for completion of care    I have reviewed the nursing notes. I have reviewed the findings, diagnosis, plan and need for follow up with the patient.    New Prescriptions    No medications on file       Final diagnoses:   Depression, unspecified depression type   Anxiety     I, Pooja Hussein, am serving as a trained medical scribe to document services personally performed by Karen Baron MD based on the provider's statements to me on January 24, 2023.  This document has been checked and approved by the attending provider.    I, Karen Baron MD, was physically present and have reviewed and verified the accuracy of this note documented by Pooja Hussein medical scribe.      Karen Baron MD  [facility] EMERGENCY DEPARTMENT  1/24/2023     Karen Baron MD  01/24/23 [id number]

## 2023-01-25 ENCOUNTER — TELEPHONE (OUTPATIENT)
Dept: FAMILY MEDICINE | Facility: CLINIC | Age: 48
End: 2023-01-25
Payer: COMMERCIAL

## 2023-01-25 NOTE — ED PROVIDER NOTES
I assumed care of this patient at 5:30 PM from my colleague pending DEC assessment.  I did receive a call from the DEC  with low suspicion that this patient is high risk.  She is not currently holdable.  They were able to thankfully arrange outpatient follow-up in 2 days time to help address anxiety.  We did provide the patient with a low-dose as needed anxiolytic for very brief period.  We are certainly happy to reevaluate this patient should there be any change in symptoms, worsening or other concern.     Catarino Chaney MD  01/24/23 [id number]

## 2023-01-25 NOTE — TELEPHONE ENCOUNTER
Reason for Call:  Appointment Request    Patient requesting this type of appt:  Hospital/ED Follow-Up     Requested provider: Tsering Lagos    Reason patient unable to be scheduled: Not within requested timeframe    When does patient want to be seen/preferred time: 1-2 days    Comments: went to ER 01/24/23 - having mental health issues - needs to get in sooner than March    Could we send this information to you in [facility] or would you prefer to receive a phone call?:   Patient would prefer a phone call   Okay to leave a detailed message?: Yes at Home number on file [phone] (home)    Call taken on 1/25/2023 at 4:06 PM by Hodan Martel

## 2023-01-26 NOTE — TELEPHONE ENCOUNTER
"Received call from patient. Patient states she has an appointment next week in [place] 2/1/23 with [facility]. Patient states she went to a therapist today, had a session, patient denies suicidal thoughts, denies thoughts of harming herself. RN inquired if patient is tired, slow speaking on the phone, patient notes she is tired and had \"problems\" a little bit ago. RN inquired on meaning of this comment, patient states \"it's nothing, we talked with the therapist and it's fine\". RN offered crisis line numbers and inquired if there was anything else we could provide at this time. Patient declines numbers, states she can look them up online, and states she will follow up with provider within visit 1/27/23.     Elier IVAN RN 1/26/2023 at 2:08 PM    "

## 2023-02-27 ENCOUNTER — APPOINTMENT (OUTPATIENT)
Dept: ULTRASOUND IMAGING | Facility: CLINIC | Age: 48
End: 2023-02-27
Attending: EMERGENCY MEDICINE
Payer: COMMERCIAL

## 2023-02-27 ENCOUNTER — HOSPITAL ENCOUNTER (EMERGENCY)
Facility: CLINIC | Age: 48
Discharge: HOME | End: 2023-02-27
Attending: EMERGENCY MEDICINE | Admitting: EMERGENCY MEDICINE
Payer: COMMERCIAL

## 2023-02-27 ENCOUNTER — HOSPITAL ENCOUNTER (EMERGENCY)
Facility: CLINIC | Age: 48
End: 2023-02-27
Payer: COMMERCIAL

## 2023-02-27 VITALS
SYSTOLIC BLOOD PRESSURE: 154 MMHG | DIASTOLIC BLOOD PRESSURE: 93 MMHG | HEIGHT: 66 IN | BODY MASS INDEX: 28.93 KG/M2 | TEMPERATURE: 97.9 F | OXYGEN SATURATION: 99 % | WEIGHT: 180 LBS | RESPIRATION RATE: 18 BRPM | HEART RATE: 98 BPM

## 2023-02-27 DIAGNOSIS — R10.13 ABDOMINAL PAIN, EPIGASTRIC: ICD-10-CM

## 2023-02-27 DIAGNOSIS — K76.0 HEPATIC STEATOSIS: ICD-10-CM

## 2023-02-27 LAB
ALBUMIN SERPL BCG-MCNC: 4.8 G/DL (ref 3.5–5.2)
ALP SERPL-CCNC: 93 U/L (ref 35–104)
ALT SERPL W P-5'-P-CCNC: 22 U/L (ref 10–35)
ANION GAP SERPL CALCULATED.3IONS-SCNC: 17 MMOL/L (ref 7–15)
AST SERPL W P-5'-P-CCNC: 24 U/L (ref 10–35)
BASOPHILS # BLD AUTO: 0.1 10E3/UL (ref 0–0.2)
BASOPHILS NFR BLD AUTO: 1 %
BILIRUB SERPL-MCNC: 0.2 MG/DL
BUN SERPL-MCNC: 9.8 MG/DL (ref 6–20)
CALCIUM SERPL-MCNC: 9.7 MG/DL (ref 8.6–10)
CHLORIDE SERPL-SCNC: 102 MMOL/L (ref 98–107)
CREAT SERPL-MCNC: 0.71 MG/DL (ref 0.51–0.95)
DEPRECATED HCO3 PLAS-SCNC: 21 MMOL/L (ref 22–29)
EOSINOPHIL # BLD AUTO: 0 10E3/UL (ref 0–0.7)
EOSINOPHIL NFR BLD AUTO: 0 %
ERYTHROCYTE [DISTWIDTH] IN BLOOD BY AUTOMATED COUNT: 14.9 % (ref 10–15)
GFR SERPL CREATININE-BSD FRML MDRD: >90 ML/MIN/1.73M2
GLUCOSE SERPL-MCNC: 114 MG/DL (ref 70–99)
HCG SERPL QL: NEGATIVE
HCT VFR BLD AUTO: 41.5 % (ref 35–47)
HGB BLD-MCNC: 13.1 G/DL (ref 11.7–15.7)
HOLD SPECIMEN: NORMAL
IMM GRANULOCYTES # BLD: 0.1 10E3/UL
IMM GRANULOCYTES NFR BLD: 0 %
LIPASE SERPL-CCNC: 15 U/L (ref 13–60)
LYMPHOCYTES # BLD AUTO: 2.7 10E3/UL (ref 0.8–5.3)
LYMPHOCYTES NFR BLD AUTO: 22 %
MCH RBC QN AUTO: 26.6 PG (ref 26.5–33)
MCHC RBC AUTO-ENTMCNC: 31.6 G/DL (ref 31.5–36.5)
MCV RBC AUTO: 84 FL (ref 78–100)
MONOCYTES # BLD AUTO: 1 10E3/UL (ref 0–1.3)
MONOCYTES NFR BLD AUTO: 8 %
NEUTROPHILS # BLD AUTO: 8.6 10E3/UL (ref 1.6–8.3)
NEUTROPHILS NFR BLD AUTO: 69 %
NRBC # BLD AUTO: 0 10E3/UL
NRBC BLD AUTO-RTO: 0 /100
PLATELET # BLD AUTO: 382 10E3/UL (ref 150–450)
POTASSIUM SERPL-SCNC: 4.1 MMOL/L (ref 3.4–5.3)
PROT SERPL-MCNC: 8.5 G/DL (ref 6.4–8.3)
RBC # BLD AUTO: 4.92 10E6/UL (ref 3.8–5.2)
SODIUM SERPL-SCNC: 140 MMOL/L (ref 136–145)
WBC # BLD AUTO: 12.4 10E3/UL (ref 4–11)

## 2023-02-27 PROCEDURE — 83690 ASSAY OF LIPASE: CPT | Performed by: EMERGENCY MEDICINE

## 2023-02-27 PROCEDURE — 96374 THER/PROPH/DIAG INJ IV PUSH: CPT

## 2023-02-27 PROCEDURE — 76705 ECHO EXAM OF ABDOMEN: CPT

## 2023-02-27 PROCEDURE — 250N000013 HC RX MED GY IP 250 OP 250 PS 637: Performed by: EMERGENCY MEDICINE

## 2023-02-27 PROCEDURE — 80053 COMPREHEN METABOLIC PANEL: CPT | Performed by: EMERGENCY MEDICINE

## 2023-02-27 PROCEDURE — 250N000011 HC RX IP 250 OP 636: Performed by: EMERGENCY MEDICINE

## 2023-02-27 PROCEDURE — 36415 COLL VENOUS BLD VENIPUNCTURE: CPT | Performed by: EMERGENCY MEDICINE

## 2023-02-27 PROCEDURE — 250N000009 HC RX 250: Performed by: EMERGENCY MEDICINE

## 2023-02-27 PROCEDURE — 84703 CHORIONIC GONADOTROPIN ASSAY: CPT | Performed by: EMERGENCY MEDICINE

## 2023-02-27 PROCEDURE — 85041 AUTOMATED RBC COUNT: CPT | Performed by: EMERGENCY MEDICINE

## 2023-02-27 PROCEDURE — 99285 EMERGENCY DEPT VISIT HI MDM: CPT | Mod: 25

## 2023-02-27 RX ORDER — SUCRALFATE 1 G/1
1 TABLET ORAL 4 TIMES DAILY
Qty: 30 TABLET | Refills: 0 | Status: SHIPPED | OUTPATIENT
Start: 2023-02-27 | End: 2023-03-02

## 2023-02-27 RX ORDER — HYDROXYZINE HYDROCHLORIDE 25 MG/1
25 TABLET, FILM COATED ORAL ONCE
Status: COMPLETED | OUTPATIENT
Start: 2023-02-27 | End: 2023-02-27

## 2023-02-27 RX ADMIN — LIDOCAINE HYDROCHLORIDE 30 ML: 20 SOLUTION ORAL; TOPICAL at 16:42

## 2023-02-27 RX ADMIN — FAMOTIDINE 20 MG: 10 INJECTION, SOLUTION INTRAVENOUS at 16:42

## 2023-02-27 RX ADMIN — HYDROXYZINE HYDROCHLORIDE 25 MG: 25 TABLET ORAL at 16:42

## 2023-02-27 ASSESSMENT — ENCOUNTER SYMPTOMS
VOMITING: 0
BLOOD IN STOOL: 0
DIFFICULTY URINATING: 0
ABDOMINAL PAIN: 1

## 2023-02-27 ASSESSMENT — ACTIVITIES OF DAILY LIVING (ADL): ADLS_ACUITY_SCORE: 33

## 2023-02-27 NOTE — ED PROVIDER NOTES
PIT/Triage Evaluation  47-year-old woman past medical history seen for anxiety, depression with a history of a  presenting for evaluation of initially generalized cramping abdominal pain and now with upper central abdominal pain.  No reported vomiting, hematemesis, melenic stools, difficulty urinating or voiding.    Exam is notable for:  Constitutional: Alert, attentive, GCS 15   Eyes: EOM are normal, anicteric, conjugate gaze  CV: distal extremities warm, well perfused  Chest: Non-labored breathing on RA  GI: Generalized epigastric tenderness, no distension. No guarding or rebound.    Neurological: Alert, attentive, moving all extremities equally.   Skin: Skin is warm and dry.        Appropriate interventions for symptom management were initiated if applicable.  Appropriate diagnostic tests were initiated if indicated.    Important information for subsequent clinician:  47-year-old woman without significant past medical history beyond , anxiety and depression presenting for initially generalized cramping abdominal pain now localized to the epigastrium.  Her abdominal exam is benign except for central epigastric pain, will plan for screening labs, right upper quadrant ultrasound and stomach acid medication.  She is anxious, as such we will give dose of hydroxyzine.    Sandro Palacio MD  [employer]  4:20 PM 23       I briefly evaluated the patient and developed an initial plan of care. I discussed this plan and explained that this brief interaction does not constitute a full evaluation. Patient/family understands that they should wait to be fully evaluated and discuss any test results with another clinician prior to leaving the hospital.       Sandro Palacio MD  [phone]

## 2023-02-27 NOTE — ED TRIAGE NOTES
"Presents to ED with generalized abdominal \"bloating\" that started this morning associated with intermittent nausea. States pain started periumbilical and now radiates up to epigastric area. VSS. ABcs intact. A&OX4. Hx .      Triage Assessment     Row Name 23 [id number]       Triage Assessment (Adult)    Airway WDL WDL       Respiratory WDL    Respiratory WDL WDL       Skin Circulation/Temperature WDL    Skin Circulation/Temperature WDL WDL       Cardiac WDL    Cardiac WDL WDL       Peripheral/Neurovascular WDL    Peripheral Neurovascular WDL WDL       Cognitive/Neuro/Behavioral WDL    Cognitive/Neuro/Behavioral WDL WDL              "

## 2023-02-28 ENCOUNTER — HOSPITAL ENCOUNTER (EMERGENCY)
Facility: CLINIC | Age: 48
Discharge: HOME | End: 2023-03-03
Attending: EMERGENCY MEDICINE | Admitting: EMERGENCY MEDICINE
Payer: COMMERCIAL

## 2023-02-28 ENCOUNTER — TELEPHONE (OUTPATIENT)
Dept: BEHAVIORAL HEALTH | Facility: CLINIC | Age: 48
End: 2023-02-28

## 2023-02-28 ENCOUNTER — TELEPHONE (OUTPATIENT)
Dept: BEHAVIORAL HEALTH | Facility: CLINIC | Age: 48
End: 2023-02-28
Payer: COMMERCIAL

## 2023-02-28 DIAGNOSIS — F32.9 MAJOR DEPRESSIVE DISORDER, SINGLE EPISODE, UNSPECIFIED: ICD-10-CM

## 2023-02-28 DIAGNOSIS — F31.81 BIPOLAR II DISORDER (H): ICD-10-CM

## 2023-02-28 DIAGNOSIS — R45.851 SUICIDAL THOUGHTS: ICD-10-CM

## 2023-02-28 DIAGNOSIS — Z11.52 ENCOUNTER FOR SCREENING LABORATORY TESTING FOR SEVERE ACUTE RESPIRATORY SYNDROME CORONAVIRUS 2 (SARS-COV-2): ICD-10-CM

## 2023-02-28 DIAGNOSIS — F41.1 GENERALIZED ANXIETY DISORDER: ICD-10-CM

## 2023-02-28 DIAGNOSIS — R44.3 HALLUCINATIONS: ICD-10-CM

## 2023-02-28 LAB
AMPHETAMINES UR QL SCN: NORMAL
BARBITURATES UR QL SCN: NORMAL
BENZODIAZ UR QL SCN: NORMAL
BZE UR QL SCN: NORMAL
CANNABINOIDS UR QL SCN: NORMAL
HCG UR QL: NEGATIVE
INTERNAL QC OK POCT: NORMAL
OPIATES UR QL SCN: NORMAL
POCT KIT EXPIRATION DATE: NORMAL
POCT KIT LOT NUMBER: NORMAL
SARS-COV-2 RNA RESP QL NAA+PROBE: NEGATIVE

## 2023-02-28 PROCEDURE — U0003 INFECTIOUS AGENT DETECTION BY NUCLEIC ACID (DNA OR RNA); SEVERE ACUTE RESPIRATORY SYNDROME CORONAVIRUS 2 (SARS-COV-2) (CORONAVIRUS DISEASE [COVID-19]), AMPLIFIED PROBE TECHNIQUE, MAKING USE OF HIGH THROUGHPUT TECHNOLOGIES AS DESCRIBED BY CMS-2020-01-R: HCPCS | Performed by: PHYSICIAN ASSISTANT

## 2023-02-28 PROCEDURE — 80307 DRUG TEST PRSMV CHEM ANLYZR: CPT | Performed by: PHYSICIAN ASSISTANT

## 2023-02-28 PROCEDURE — 90791 PSYCH DIAGNOSTIC EVALUATION: CPT

## 2023-02-28 PROCEDURE — 81025 URINE PREGNANCY TEST: CPT | Performed by: PHYSICIAN ASSISTANT

## 2023-02-28 RX ORDER — SERTRALINE HYDROCHLORIDE 25 MG/1
1 TABLET, FILM COATED ORAL EVERY MORNING
COMMUNITY
Start: 2023-02-07 | End: 2024-03-01

## 2023-02-28 RX ORDER — HYDROXYZINE HYDROCHLORIDE 25 MG/1
25 TABLET, FILM COATED ORAL EVERY MORNING
COMMUNITY
Start: 2023-02-07 | End: 2024-03-01

## 2023-02-28 ASSESSMENT — COLUMBIA-SUICIDE SEVERITY RATING SCALE - C-SSRS
5. HAVE YOU STARTED TO WORK OUT OR WORKED OUT THE DETAILS OF HOW TO KILL YOURSELF? DO YOU INTEND TO CARRY OUT THIS PLAN?: NO
4. HAVE YOU HAD THESE THOUGHTS AND HAD SOME INTENTION OF ACTING ON THEM?: NO
2. HAVE YOU ACTUALLY HAD ANY THOUGHTS OF KILLING YOURSELF?: YES
5. HAVE YOU STARTED TO WORK OUT OR WORKED OUT THE DETAILS OF HOW TO KILL YOURSELF? DO YOU INTEND TO CARRY OUT THIS PLAN?: NO
REASONS FOR IDEATION LIFETIME: DOES NOT APPLY
TOTAL  NUMBER OF ABORTED OR SELF INTERRUPTED ATTEMPTS LIFETIME: NO
1. HAVE YOU WISHED YOU WERE DEAD OR WISHED YOU COULD GO TO SLEEP AND NOT WAKE UP?: YES
6. HAVE YOU EVER DONE ANYTHING, STARTED TO DO ANYTHING, OR PREPARED TO DO ANYTHING TO END YOUR LIFE?: NO
1. IN THE PAST MONTH, HAVE YOU WISHED YOU WERE DEAD OR WISHED YOU COULD GO TO SLEEP AND NOT WAKE UP?: YES
4. HAVE YOU HAD THESE THOUGHTS AND HAD SOME INTENTION OF ACTING ON THEM?: NO
TOTAL  NUMBER OF INTERRUPTED ATTEMPTS LIFETIME: NO
3. HAVE YOU BEEN THINKING ABOUT HOW YOU MIGHT KILL YOURSELF?: NO
2. HAVE YOU ACTUALLY HAD ANY THOUGHTS OF KILLING YOURSELF?: SEE ABOVE
ATTEMPT LIFETIME: NO
REASONS FOR IDEATION PAST MONTH: MOSTLY TO END OR STOP THE PAIN (YOU COULDN'T GO ON LIVING WITH THE PAIN OR HOW YOU WERE FEELING)
2. HAVE YOU ACTUALLY HAD ANY THOUGHTS OF KILLING YOURSELF?: YES

## 2023-02-28 ASSESSMENT — ACTIVITIES OF DAILY LIVING (ADL)
ADLS_ACUITY_SCORE: 35
ADLS_ACUITY_SCORE: 33
ADLS_ACUITY_SCORE: 35

## 2023-02-28 NOTE — ED PROVIDER NOTES
"  History     Chief Complaint:  Abdominal Pain    The history is provided by the patient.      Prasanth Nova is a 47 year old female who presents with abdominal pain. The patient reports experiencing what was initially generalized cramping abdominal pain and now with upper central abdominal pain.  No reported vomiting, hematemesis, melenic stools, difficulty urinating or voiding.    Independent Historian:   None - Patient Only    Review of External Notes: I had a mental health assessment, 2023 for anxiety and suicidal ideation.  Was cleared to go home.    ROS:  Review of Systems   Gastrointestinal: Positive for abdominal pain. Negative for blood in stool and vomiting.   Genitourinary: Negative for difficulty urinating.   All other systems reviewed and are negative.    Allergies:  Peanut (Diagnostic)  Nuts  Shrimp     Medications:    Fluticasone  Lorazepam  Mirtazapine  Risperidone  Albuterol    Past Medical History:    Anxiety  Depression  Asthma     Past Surgical History:     section     Family History:    Depression     Social History:  Presents with friend  Presents via private vehicle     Physical Exam     Patient Vitals for the past 24 hrs:   BP Temp Temp src Pulse Resp SpO2 Height Weight   23 1347 (!) 154/93 97.9  F (36.6  C) Temporal 98 18 99 % 1.676 m (5' 6\") 81.6 kg (180 lb)      Physical Exam  Constitutional: Alert, attentive, GCS 15   Eyes: EOM are normal, anicteric, conjugate gaze  CV: distal extremities warm, well perfused  Chest: Non-labored breathing on RA  GI: Generalized epigastric tenderness, no distension. No guarding or rebound.    Neurological: Alert, attentive, moving all extremities equally.   Skin: Skin is warm and dry.     Emergency Department Course   Imaging:  US Abdomen Limited   Final Result   IMPRESSION:   Echogenic and attenuating hepatic parenchyma consistent with diffuse hepatocellular disease, most commonly steatosis.               Report per " radiology    Laboratory:  Labs Ordered and Resulted from Time of ED Arrival to Time of ED Departure   COMPREHENSIVE METABOLIC PANEL - Abnormal       Result Value    Sodium 140      Potassium 4.1      Chloride 102      Carbon Dioxide (CO2) 21 (*)     Anion Gap 17 (*)     Urea Nitrogen 9.8      Creatinine 0.71      Calcium 9.7      Glucose 114 (*)     Alkaline Phosphatase 93      AST 24      ALT 22      Protein Total 8.5 (*)     Albumin 4.8      Bilirubin Total 0.2      GFR Estimate >90     CBC WITH PLATELETS AND DIFFERENTIAL - Abnormal    WBC Count 12.4 (*)     RBC Count 4.92      Hemoglobin 13.1      Hematocrit 41.5      MCV 84      MCH 26.6      MCHC 31.6      RDW 14.9      Platelet Count 382      % Neutrophils 69      % Lymphocytes 22      % Monocytes 8      % Eosinophils 0      % Basophils 1      % Immature Granulocytes 0      NRBCs per 100 WBC 0      Absolute Neutrophils 8.6 (*)     Absolute Lymphocytes 2.7      Absolute Monocytes 1.0      Absolute Eosinophils 0.0      Absolute Basophils 0.1      Absolute Immature Granulocytes 0.1      Absolute NRBCs 0.0     LIPASE - Normal    Lipase 15     HCG QUALITATIVE PREGNANCY - Normal    hCG Serum Qualitative Negative     ROUTINE UA WITH MICROSCOPIC      Emergency Department Course & Assessments:     Interventions:  Medications   famotidine (PEPCID) injection 20 mg (20 mg Intravenous $Given 2/27/23 1642)   lidocaine (viscous) (XYLOCAINE) 2 % 15 mL, alum & mag hydroxide-simethicone (MAALOX) 15 mL GI Cocktail (30 mLs Oral $Given 2/27/23 1642)   hydrOXYzine (ATARAX) tablet 25 mg (25 mg Oral $Given 2/27/23 1642)      Independent Interpretation (X-rays, CTs, rhythm strip):  None    Consultations/Discussion of Management or Tests:  None    Social Determinants of Health affecting care:   None    Assessments:  1341 I obtained history and examined the patient as noted above.  1848 I rechecked and updated the patient.     Disposition:  The patient was discharged to home.      Impression & Plan    Medical Decision Makin-year-old woman without significant past medical history beyond , anxiety and depression presenting for initially generalized cramping abdominal pain now localized to the epigastrium.  Her abdominal exam is benign except for central epigastric pain, with low suspicion for hollow viscus perforation, obstruction or infection.  No indication for CT imaging.  Screening labs including lipase and LFTs unremarkable.  Pregnancy test is negative.  Right upper quadrant ultrasound was obtained due to the location of her discomfort, this is unremarkable except for hepatic steatosis.  I recommended stomach acid medication and PCP follow-up.  She does appear somewhat anxious, as such she was given a dose of hydroxyzine and felt significant improvement with the above medication consistent with likely GERD.       Diagnosis:    ICD-10-CM    1. Abdominal pain, epigastric  R10.13       2. Hepatic steatosis  K76.0          Discharge Medications:  New Prescriptions    OMEPRAZOLE (PRILOSEC) 20 MG DR CAPSULE    Take 1 capsule (20 mg) by mouth daily    SUCRALFATE (CARAFATE) 1 GM TABLET    Take 1 tablet (1 g) by mouth 4 times daily      Sandro Palacio MD  [employer]  4:51 PM 23     Scribe Disclosure:  I, Acacia Dove, am serving as a scribe at 6:30 PM on 2023 to document services personally performed by Sandro Palacio MD based on my observations and the provider's statements to me.     2023   Sandro Palacio MD Dunbar, John Forrest, MD  23 [phone]

## 2023-02-28 NOTE — TELEPHONE ENCOUNTER
S: [facility]  Triny calling at 5:41 PM  about a 47 year old/Female presenting with psychosis, SI        B: Pt arrived via Family. Presenting problem, stressors: Pt was in ED a month ago for similar presentation of increased paranoia at home, fear, delusions that people are watching her through her TV, hears many voices telling her to hurt herself, perseverates on guilt that she is a bad mom, Pt believes that her food is poisoned even if she watches people prepare the food    Pt affect in ED: Flat, Guarded and Paranoid  Pt Dx: Major Depressive Disorder and Unspecified Psychosis  Previous [facility] hx? Yes: 3 years ago    Pt endorses SI, no plan   Hx of suicide attempt? No  Pt denies SIB  Pt denies HI   Pt endorses auditory hallucinations .     Hx of aggression/violence, sexual offences, legal concerns, or Epic care plan? None  Current concerns for aggression this visit? No  Does pt have a history of Civil Commitment? No  Is Pt their own guardian? Yes    Pt is prescribed medication. Is patient medication compliant? Yes  Pt endorses OP services: Psychiatrist and Therapist  CD concerns: None  Acute or chronic medical concerns: None  Does Pt present with specific needs, assistive devices, or exclusionary criteria? None      Pt is ambulatory  Pt is able to perform ADLs independently      A: Pt to be reviewed for [facility] admission. Pt is Voluntary  Preferred placement: [facility] ONLY, [facility]    COVID:Ordered, not yet collected  Utox: Ordered, not yet collected   CMP: N/A  CBC: N/A  HCG: Ordered, not yet collected    R: Patient cleared and ready for behavioral bed placement: Yes  Pt placed on IP worklist? Yes

## 2023-02-28 NOTE — DISCHARGE INSTRUCTIONS
It is okay to take Tylenol as needed for abdominal pain, I would avoid Advil, Motrin, Aleve, ibuprofen and aspirin as these can be hard on the stomach.  You should take the stomach acid medications as prescribed.  If you develop worsening abdominal pain fever you should return here in the emergency department otherwise you should follow-up with your primary doctor for a recheck.

## 2023-02-28 NOTE — CONSULTS
Diagnostic Evaluation Consultation  Crisis Assessment    Patient was assessed: Tanya  Patient location: [facility] [place], [place], [place]   Was a release of information signed: Yes. Providers included on the release: DEC        Referral Data and Chief Complaint  Prasanth is a 47 year old, who uses she/her pronouns, and presents to the ED with family/friends. Patient is referred to the ED by community provider(s). Patient is presenting to the ED for the following concerns: Prasanth has been having increased paranoia, hallucinations and passive suicidal ideation the last 2 weeks. She states that she was here in the ER approx 1 month ago with similar presentation. Has been seeing an outpatient therapist and psychiatrist. Prasanth restarted on medications about a month ago and reports that she saw her psychiatrist today who expressed concerns about her presentation and recommended coming to the ER for evaluation. Pt's SONewton, is at bedside and is supportive.     Informed Consent and Assessment Methods     Patient is her own guardian. Writer met with patient and explained the crisis assessment process, including applicable information disclosures and limits to confidentiality, assessed understanding of the process, and obtained consent to proceed with the assessment. Patient was observed to be able to participate in the assessment as evidenced by met with pt and explained assessment process. Pt verbalized understanding of the assessment process and gave consent for assessment. . Assessment methods included conducting a formal interview with patient, review of medical records, collaboration with medical staff, and obtaining relevant collateral information from family and community providers when available..     Over the course of this crisis assessment provided reassurance, offered validation, engaged patient in problem solving and disposition planning, worked with patient on safety  "and aftercare planning, provided psychoeducation and facilitated family communication. Patient's response to interventions was guarded, quiet and withdrawn during interview.      Summary of Patient Situation    Having passive suicidal thoughts in the last 2 weeks: life would be better off if I wasn't here, thoughts of death, etc.   Denied plan or intent at time of interview. No previous attempts  Increased paranoia. Feels like people are watching her, especially though the TV  Increasing anxiety. Feels worried, sense of guilt and shame. Feels like she was a \"bad mom\" even though her daughter is successful adult   Auditory hallucinations: reports hearing voices in her head, contributing to the feelings of guilt and shame     Brief Psychosocial History    Pt is from [place], where her parents still live. Very close with family, brother in [place] and another one in [place]. Family has been worried as well   Has a Bachelor's and Master's degree in counseling/therapy.   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 Pt is very close with her daughter  Enjoys reading and singing in her free time     Significant Clinical History    Patient reported she has a history of diagnoses of depression and anxiety. Denies any history of substance use. She reported her last ED visit / hospitalization being in April 2019 where she was admitted for 1 week for depression with psychosis at [facility].  She reported being discharged with medications for mental health and stated these were helpful.  She reported at some point she went off of the medications and reported the \"voices\" continued and are still present to this day.    "   Collateral Information    Pt's SO, Newton, is at bedside and he is very supportive. He has been worried about pt. She is not eating for the past 2 days, paranoid that people that put things in her food or drink. This is not like her as pt loves to eat, drink, cook. Pt is not sleeping well, she wakes up a lot. Feels guarded, he touched her shoulder today and home and she jumped and felt like he was there to hurt her. Has been not herself lately; not xtaking care of self. Pt loves to wear makeup and dress up and now she can barely get out of bed. He was concerned today as pt has been talking about feeling suicidal due to hearing the voices now and he felt she needed to come into the ER.     Risk Assessment    Sunspot Suicide Severity Rating Scale Full Clinical Version: today 2/28/23  Suicidal Ideation  1. Wish to be Dead (Lifetime): Yes  Wish to be Dead Description (Lifetime): having thoughts that life would be better if I wasn't here  1. Wish to be Dead (Past 1 Month): Yes  Wish to be Dead Description (Past 1 Month): thoughts that life/people would be better off if I wasn't here  2. Non-Specific Active Suicidal Thoughts (Lifetime): Yes  Non-Specific Active Suicidal Thought Description (Lifetime): see above  2. Non-Specific Active Suicidal Thoughts (Past 1 Month): Yes  Non-Specific Active Suicidal Thought Description (Past 1 Month): yes, recent passive suicidal ideation. no current plan or intent  3. Active Suicidal Ideation with any Methods (Not Plan) Without Intent to Act (Lifetime): No  3. Active Suicidal Ideation with any Methods (Not Plan) Without Intent to Act (Past 1 Month): No  4. Active Suicidal Ideation with Some Intent to Act, Without Specific Plan (Lifetime): No  4. Active Suicidal Ideation with Some Intent to Act, Without Specific Plan (Past 1 Month): No  5. Active Suicidal Ideation with Specific Plan and Intent (Lifetime): No  5. Active Suicidal Ideation with Specific Plan and Intent (Past 1 Month):  No  Intensity of Ideation  Most Severe Ideation Rating (Lifetime): 2  Most Severe Ideation Rating (Past 1 Month): 2  Description of Most Severe Ideation (Past 1 Month): thoughts of death but no current plan or intent  Frequency (Lifetime): Less than once a week  Frequency (Past 1 Month): Many times each day  Duration (Lifetime): Fleeting, few seconds or minutes  Duration (Past 1 Month): More than 8 hours/persistent or continuous  Controllability (Lifetime): Easily able to control thoughts  Controllability (Past 1 Month): Unable to control thoughts  Deterrents (Lifetime): Deterrents definitely stopped you from attempting suicide  Deterrents (Past 1 Month): Deterrents probably stopped you  Reasons for Ideation (Lifetime): Does not apply  Reasons for Ideation (Past 1 Month): Mostly to end or stop the pain (You couldn't go on living with the pain or how you were feeling)  Suicidal Behavior  Actual Attempt (Lifetime): No  Has subject engaged in non-suicidal self-injurious behavior? (Lifetime): No  Interrupted Attempts (Lifetime): No  Aborted or Self-Interrupted Attempt (Lifetime): No  Preparatory Acts or Behavior (Lifetime): No  C-SSRS Risk (Lifetime/Recent)  Calculated C-SSRS Risk Score (Lifetime/Recent): Low Risk      Validity of evaluation is impacted by presenting factors during interview   Comments regarding subjective versus objective responses to [place] tool: due to pt's auditory hallucinations & paranoia, it would be imperaptive to repeat [place] when she is cleared.    Environmental or Psychosocial Events: helplessness/hopelessness  Chronic Risk Factors: chronic and ongoing sleep difficulties and serious, persistent mental illness   Warning Signs: anxiety, agitation, unable to sleep, sleeping all the time  Protective Factors: strong bond to family unit, community support, or employment, intact marriage or domestic partnership, responsibilities and duties to others, including pets and children, lives in a  responsibly safe and stable environment, sense of importance of health and wellness, able to access care without barriers, supportive ongoing medical and mental health care relationships, help seeking, good impulse control, good problem-solving, coping, and conflict resolution skills, sense of belonging, sense of self-efficacy and/or positive self-esteem, cultural, spiritual , or Confucianist beliefs associated with meaning and value in life, optimistic outlook - identification of future goals and constructive use of leisure time, enjoyable activities, resilience  Interpretation of Risk Scoring, Risk Mitigation Interventions and Safety Plan:  Low to moderate risk.        Does the patient have thoughts of harming others? No     Is the patient engaging in sexually inappropriate behavior?  no        Current Substance Abuse     Is there recent substance abuse? no     Was a urine drug screen or blood alcohol level obtained: Yes pending for inpatient hospitalization         Mental Status Exam     Affect: Blunted   Appearance: Disheveled    Attention Span/Concentration: Attentive  Eye Contact: Avoidant   Fund of Knowledge: Delayed    Language /Speech Content: Fluent   Language /Speech Volume: Soft    Language /Speech Rate/Productions: Minimally Responsive    Recent Memory: Poor   Remote Memory: Poor   Mood: Anxious    Orientation to Person: Yes    Orientation to Place: Yes   Orientation to Time of Day: Yes    Orientation to Date: Yes    Situation (Do they understand why they are here?): Yes    Psychomotor Behavior: Normal    Thought Content: Hallucinations and Paranoia   Thought Form: Intact      History of commitment: No     Medication    fluticasone (FLONASE) 50 MCG/ACT nasal spray  LORazepam (ATIVAN) 0.5 MG tablet  mirtazapine (REMERON) 15 MG tablet  omeprazole (PRILOSEC) 20 MG DR capsule  risperiDONE (RISPERDAL) 0.5 MG tablet  sucralfate (CARAFATE) 1 GM tablet  VENTOLIN  (90 Base) MCG/ACT inhaler  VENTOLIN   (90 Base) MCG/ACT inhaler     Current Care Team    Primary Care Provider: Tsering Lagos MD  Psychiatrist: No  Therapist: Reported she last saw a therapist through [facility] via telephone. Only able to recall first name was Aleja.  : No     CTSS or ARMHS: No  ACT Team: No  Other: No    Diagnosis    311 (F32.9) Unspecified Depressive Disorder - by history  300.02 (F41.1) Generalized Anxiety Disorder - by history     Clinical Summary and Substantiation of Recommendations    Pt was in the ER one month ago and tried outpatient supports but it appears that her condition is worsening. In order to promote health of patient and prevent further deteoriation in mental health, recommendation for inpatient hospitalization was given tonight. Pt and SO were in agreement with plan of care.   Admission to Inpatient Level of Care is indicated due to:    1. Patient risk of severity of behavioral health disorder is appropriate to proposed level of care as indicated by:    Behavioral health disorder is present and appropriate for inpatient care with both of the following:     Severe psychiatric, behavioral or other comorbid conditions are appropriate for management at inpatient mental health as indicated by at least one of the following:   o Hallucinations; delusions; positive symptoms, Depressive symptoms and Anxiety    Severe dysfunction in daily living is present as indicated by at least one of the following:   o Extreme deterioration in social interactions and Complete neglect of self care with associated impairment in physical status    2. Inpatient mental health services are necessary to meet patient needs and at least one of the following:  Specific condition related to admission diagnosis is present and judged likely to further improve at proposed level of care    3. Situation and expectations are appropriate for inpatient care, as indicated by one of the following:    Patient management/treatment at lower level of care is not feasible or is inappropriate    Disposition    Recommended disposition: Inpatient Mental Health       Reviewed case and recommendations with attending provider. Attending Name: NEREIDA Saunders     Attending concurs with disposition: Yes       Patient concurs with disposition: Yes       Guardian concurs with disposition: NA      Final disposition: Inpatient mental health .     Inpatient Details (if applicable):   Is patient admitted voluntarily:Yes      Patient aware of potential for transfer if there is not appropriate placement? Yes       Patient is willing to travel outside of the [facility] for placement? Yes      Behavioral Intake Notified? Yes: Date: 2/28/23 Time: 1750.     Assessment Details    Patient interview started at: 1645 and completed at: 1730.     Total duration spent on the patient case in minutes: 2.0 hrs      CPT code(s) utilized: 21442 - Psychotherapy for Crisis - 60 (30-74*) min       CALEB Mojica, [facility], Psychotherapist  DEC - Triage & Transition Services  Callback: [phone]

## 2023-02-28 NOTE — ED NOTES
Patient is in her street clothes. She was wanded. Belongings were removed. Patient has her self phone on her person. She is wearing a draw string that is threaded through the cloth of her clothing. The 1:1 tech is aware of this. Family is at bedside. Patient appears to be fearful and overstimulated with people in the room.

## 2023-02-28 NOTE — ED PROVIDER NOTES
"ED Provider Note  [facility]      History     Chief Complaint   Patient presents with     Mental Health Problem     HPI  Prasanth Nova is a 47 year old female with past medical history significant for major depressive disorder with psychotic features who presents to the emergency department tonight with her boyfriend with concerns for hallucinations and emotional instability.    In speaking with the patient in private, she tells me that over the last several weeks she has been having difficulties with decreased appetite, feeling unsafe at home, and passive thoughts of suicidality.  Patient states to me that she has no current plan, and has no history of attempted suicide in the past.  She states that she does not want to harm herself.  Patient tells me that she is having some auditory hallucinations which she tells me is \"her own voice\", which is giving her difficulties in giving her stress.  Patient states that she feels she is a burden to her boyfriend, and is keeping her boyfriend up at night due to her anxiety and stress.  Patient states that she feels emotionally unstable at home.  She has been taking her medications as prescribed, per EMR and patient report patient is on risperidone 0.5 mg twice daily as well as Remeron 15 mg nightly.  Per boyfriend patient has been on this now for about 3 weeks.  Patient tells me she is not using any recreational drugs or alcohol.  Patient states she has been admitted in the past for mental health difficulties, would like to be admitted today.    In private patient's boyfriend tells me that they were seen earlier this morning by a provider who manages patient's psych medications.  At that point patient had reported hallucinations, auditory, and she was prompted to come to the ED right away for further evaluation recommended admission.    Past Medical History  Past Medical History:   Diagnosis Date     Anxiety      Depressive disorder      " Uncomplicated asthma      Past Surgical History:   Procedure Laterality Date      SECTION       ORTHOPEDIC SURGERY       fluticasone (FLONASE) 50 MCG/ACT nasal spray  hydrOXYzine (ATARAX) 25 MG tablet  LORazepam (ATIVAN) 0.5 MG tablet  mirtazapine (REMERON) 15 MG tablet  omeprazole (PRILOSEC) 20 MG DR capsule  risperiDONE (RISPERDAL) 0.5 MG tablet  sertraline (ZOLOFT) 25 MG tablet  sucralfate (CARAFATE) 1 GM tablet  VENTOLIN  (90 Base) MCG/ACT inhaler  VENTOLIN  (90 Base) MCG/ACT inhaler      Allergies   Allergen Reactions     Peanut (Diagnostic)      Other reaction(s): Throat Swelling/Closing  Tight throat     Nuts      Tight throat     Shrimp      Tight throat     Family History  Family History   Problem Relation Age of Onset     Depression Mother      Depression Sister      Social History   Social History     Tobacco Use     Smoking status: Never     Smokeless tobacco: Never   Substance Use Topics     Alcohol use: Yes     Comment: occasionally     Drug use: No         A medically appropriate review of systems was performed with pertinent positives and negatives noted in the HPI, and all other systems negative.    Physical Exam   BP: (!) 153/65  Pulse: 66  Temp: 98  F (36.7  C)  Resp: 20  SpO2: 98 %  Physical Exam    GENERAL APPEARANCE: The patient is well developed, well appearing, and in no acute distress.  HEAD:  Normocephalic and atraumatic.   EENT: Voice normal.  NECK: Trachea is midline.No lymphadenopathy or tenderness.  LUNGS: Breath sounds are equal and clear bilaterally. No wheezes, rhonchi, or rales.  HEART: Regular rate and normal rhythm.    EXTREMITIES: No cyanosis, clubbing, or edema.  NEUROLOGIC: No focal sensory or motor deficits are noted.  PSYCHIATRIC: The patient is awake, alert.  Appropriate mood and affect.  Patient is tearful does appear anxious.  SKIN: Warm, dry, and well perfused. Good turgor.      ED Course, Procedures, & Data           Results for orders placed or  performed during the hospital encounter of 02/28/23   Asymptomatic COVID-19 Virus (Coronavirus) by PCR Nasopharyngeal     Status: Normal    Specimen: Nasopharyngeal; Swab   Result Value Ref Range    SARS CoV2 PCR Negative Negative    Narrative    Testing was performed using the Xpert Xpress SARS-CoV-2 Assay on the Cepheid Gene-Xpert Instrument Systems. Additional information about this Emergency Use Authorization (EUA) assay can be found via the Lab Guide. This test should be ordered for the detection of SARS-CoV-2 in individuals who meet SARS-CoV-2 clinical and/or epidemiological criteria as well as from individuals without symptoms or other reasons to suspect COVID-19. Test performance for asymptomatic patients has only been established in anterior nasal swab specimens. This test is for in vitro diagnostic use under the FDA EUA for laboratories certified under CLIA to perform high complexity testing. This test has not been FDA cleared or approved. A negative result does not rule out the presence of PCR inhibitors in the specimen or target RNA concentration below the limit of detection for the assay. The possibility of a false negative should be considered if the patient's recent exposure or clinical presentation suggests COVID-19. This test was validated by [facility] group. These Laboratories are certified under the Clinical Laboratory Improvement Amendments (CLIA) as qualified to perform high complexity testing.     hCG qual urine POCT     Status: Normal   Result Value Ref Range    HCG Qual Urine Negative Negative    Internal QC Check POCT Valid Valid    POCT Kit Lot Number [id number]     POCT Kit Expiration Date 03/31/2024    Urine Drugs of Abuse Screen     Status: None (In process)    Narrative    The following orders were created for panel order Urine Drugs of Abuse Screen.  Procedure                               Abnormality         Status                     ---------                                -----------         ------                     Drug abuse screen 1 urin...[[id number]]                      In process                   Please view results for these tests on the individual orders.     Medications - No data to display  Labs Ordered and Resulted from Time of ED Arrival to Time of ED Departure   COVID-19 VIRUS (CORONAVIRUS) BY PCR - Normal       Result Value    SARS CoV2 PCR Negative     HCG QUALITATIVE URINE POCT - Normal    HCG Qual Urine Negative      Internal QC Check POCT Valid      POCT Kit Lot Number [id number]      POCT Kit Expiration Date 03/31/2024     DRUG ABUSE SCREEN 1 URINE (ED)     No orders to display          Critical care was not performed.     Medical Decision Making  The patient's presentation was of high complexity (a chronic illness severe exacerbation, progression, or side effect of treatment).    The patient's evaluation involved:  discussion of management or test interpretation with another health professional (see separate area of note for details)    The patient's management necessitated high risk (a decision regarding hospitalization).      Assessment & Plan    This is a 47-year-old female with history of depression, history of hallucinations, presenting with her boyfriend with concerns for increased suicidal thoughts, auditory hallucinations, mood instability, and feeling unable to take care of herself at home.  On presentation here patient is tearful, telling me that she does wish to be admitted to the hospital.  I spoke with her boyfriend as well in private, and he feels that patient would benefit with admission as she is unable to care for self at home due to her stress and depression.  DEC assessment was placed, and I spoke with DEC  who feels patient would also benefit with voluntary admission.  UDS urine pregnancy and COVID ordered, patient will be admitted to the inpatient psychiatric team for further management and stabilization.    Patient discussed with  attending physician Dr. Cameron, who agrees with my plan of care.    At time of signout, awaiting urine drug screen and placement to the psychiatric unit.  Night physician made aware.    I have reviewed the nursing notes. I have reviewed the findings, diagnosis, plan and need for follow up with the patient.    New Prescriptions    No medications on file       Final diagnoses:   Hallucinations   Suicidal thoughts       GIOVANNI Saunders  [facility] EMERGENCY DEPARTMENT  2/28/2023    --    ED Attending Physician Attestation    I Hodan Cameron MD, cared for this patient with the Physician Assistant. I have performed a history and physical examination of the patient independent of the PA. I reviewed the PA's documentation above and agree with the documented findings and plan of care. I personally provided a substantive portion of the care for this patient, including the complete Medical Decision Making. Please see the PA's documentation for full details.    Summary of HPI, PE, ED Course   Patient is a 47 year old female evaluated in the emergency department for acute on chronic auditory hallucinations and passive suicidal ideation.  Work-up reveals negative urinary pregnancy test, negative urine drug of abuse screen, negative COVID-19 PCR.  Patient was evaluated by DEC  and  appropriate for inpatient psychiatric admission.  After the completion of care in the emergency department, the patient was admitted on voluntary basis.      Critical Care & Procedures  Not applicable.    Medical Decision Making  The patient's presentation was of high complexity (a chronic illness severe exacerbation, progression, or side effect of treatment).    The patient's evaluation involved:  discussion of management or test interpretation with another health professional (see separate area of note for details)    The patient's management necessitated high risk (a decision regarding hospitalization).    Hodan Cameron  MD  Emergency Medicine        Hodan Cameron MD  03/01/23 0129

## 2023-03-01 ENCOUNTER — TELEPHONE (OUTPATIENT)
Dept: BEHAVIORAL HEALTH | Facility: CLINIC | Age: 48
End: 2023-03-01
Payer: COMMERCIAL

## 2023-03-01 PROCEDURE — 99285 EMERGENCY DEPT VISIT HI MDM: CPT | Mod: 25

## 2023-03-01 PROCEDURE — C9803 HOPD COVID-19 SPEC COLLECT: HCPCS

## 2023-03-01 PROCEDURE — 99285 EMERGENCY DEPT VISIT HI MDM: CPT | Performed by: EMERGENCY MEDICINE

## 2023-03-01 PROCEDURE — 250N000013 HC RX MED GY IP 250 OP 250 PS 637: Performed by: EMERGENCY MEDICINE

## 2023-03-01 RX ORDER — TRAZODONE HYDROCHLORIDE 50 MG/1
50 TABLET, FILM COATED ORAL AT BEDTIME
Status: DISCONTINUED | OUTPATIENT
Start: 2023-03-01 | End: 2023-03-01

## 2023-03-01 RX ORDER — HYDROXYZINE HYDROCHLORIDE 25 MG/1
25 TABLET, FILM COATED ORAL 3 TIMES DAILY PRN
Status: DISCONTINUED | OUTPATIENT
Start: 2023-03-01 | End: 2023-03-03 | Stop reason: HOSPADM

## 2023-03-01 RX ADMIN — HYDROXYZINE HYDROCHLORIDE 25 MG: 25 TABLET ORAL at 21:40

## 2023-03-01 RX ADMIN — TRAZODONE HYDROCHLORIDE 25 MG: 50 TABLET ORAL at 21:40

## 2023-03-01 ASSESSMENT — ACTIVITIES OF DAILY LIVING (ADL)
ADLS_ACUITY_SCORE: 35

## 2023-03-01 NOTE — ED PROVIDER NOTES
Extended care continues to recommend admission for severe depression, suicidal thoughts and visual hallucinations. it is unclear if she has been off meds or had med change so I spoke to pharmacy who did a med fill research and it appears she last had zoloft early February and prn atarax tid. I wrote for atarax prn.  She has been having trouble sleeping so wrote for trazodone 50 mg at bedtime.  She sees her spirituality as support for herself so I requested they come and visit her per dec recommendation.        Margo Rosa MD  03/01/23 [id number]

## 2023-03-01 NOTE — ED NOTES
"Triage & Transition Services, Extended Care     Therapy Progress Note    Patient: Prasanth goes by \"Idpuneet,\" uses she/her pronouns  Date of Service: March 1, 2023  Site of Service: [facility]  Patient was seen virtually (Arch[employer] cart or other teleconferencing device).     Presenting problem:   Prasanth is followed related to Long wait time for admission: 24+ in the ED.  Please see initial DEC/[facility] Crisis Assessment completed by Triny Corwe on 2/28/23 for complete assessment information. Notable concerns include paranoia, hallucinations, suicidal ideation.     Individuals Present: Idpuneet & Carrie Rock    Session start: 2:30 pm  Session end: 2:53  Session duration in minutes: 23 minutes  Session number: 1  Anticipated number of sessions or this episode of care: 2-5  CPT utilized: 94923 - Psychotherapy (with patient) - 30 (16-37*) min    Current Presentation:   Patient transferred from the [place] ED to [facility].  Patient appears somewhat disoriented or as if attending to internal stimuli.  She looks around the room and as engages at times in intense stare.  Patient has a blunted affect.  She engages in session  Responses are slow and soft spoken.  She reports being a bad person and doing things that harm others.  She identifies suicidal ideation and states doing that would be another selfish bad thing to do.  She reports feeling more calm since transferring to [facility].  She looks anxious and tired on screen. She reports experiencing hallucinations and seeing graphic figures.  She points to the floor and states she sees designs on the floor.  Patient shares that her family, her Taoism, and exercise are what she leans on to cope.  Patient identifies being Anabaptism.  Offered patient a visit from spiritual care.  Patient states she would like that.   She reports she did not sleep well and would like to rest.     Mental Status Exam:   Appearance: disheveled   Attitude: somewhat cooperative  Eye Contact: fair, at times " intense.  Mood: anxious, sad  and depressed  Affect: intensity is blunted  Speech: clear, coherent, soft  Psychomotor Behavior: no evidence of tardive dyskinesia, dystonia, or tics  Thought Process:  intact  Associations: none observed  Thought Content: passive suicidal ideation present, visual hallucinations present and patient appears to be responding to internal stimuli  Insight: fair  Judgement: fair  Oriented to: time, person, and place  Attention Span and Concentration: fair  Recent and Remote Memory: fair    Diagnosis:   311 (F32.9) Unspecified Depressive Disorder - by history  300.02 (F41.1) Generalized Anxiety Disorder - by history     Therapeutic Intervention(s):   Provided active listening, unconditional positive regard, and validation. Identified stress relief practices. Explored strategies for self-soothing.     Treatment Objective(s) Addressed:   The focus of this session was on rapport building and assessing safety.     Progress Towards Goals:   Patient reports stable symptoms. Patient is not making progress towards treatment goals as evidenced by continued psychosis symptoms and suicidal ideation.     General Recommendations:   Continue to monitor for harm. Consider: Use a positive, direct and calm approach. Pt's tend to match the energy/mood of the staff. Keep focus positive and upbeat, Provide the pt with options to provide a sense of control. Try to tell the pt what they can do instead of what they can't do, Allow family calls/visits, Be firm but gentle when redirecting and Listen in a neutral, non-judgmental way. Offer reassurance    Plan:   Inpatient Mental Health: Plan for voluntary inpatient mental health.  Discussed with Dr. Rosa that patient would like a spiritual care visit.  Patient reporting difficulty sleeping and could potentially benefit from something for sleep.  Dr Rosa reports plan to clarify patient's medications.      Plan for Care reviewed with Assigned Medical Provider? Yes.  Provider, Dr. Rosa, response: agreement     Carrie Rock, [facility]  Licensed Mental Health Professional (LMHP), [facility]  [phone]

## 2023-03-01 NOTE — ED NOTES
Prasanth Nova  February 28, 2023  Plan of Care Hand-off Note     Patient Care Path: Inpatient Mental Health    Plan for Care: Prasanth is a 47 year old, who uses she/her pronouns, and presents to the ED with family/friends. Patient is referred to the ED by community provider(s). Patient is presenting to the ED for the following concerns: Prasanth has been having increased paranoia, hallucinations and passive suicidal ideation the last 2 weeks. She states that she was here in the ER approx 1 month ago with similar presentation. Has been seeing an outpatient therapist and psychiatrist. Prasanth restarted on medications about a month ago and reports that she saw her psychiatrist today who expressed concerns about her presentation and recommended coming to the ER for evaluation. Pt's S[place], is at bedside and is supportive.     Critical Safety Issues: pt having increased auditory hallucinations     Overview:  This patient is a child/adolescent: No    This patient has additional special visitor precautions: No    Legal Status: Voluntary    Contacts:   GILBERT Glez. [phone]    Psychiatry Consult:  Psychiatry Consult not requested because referral for inpatient hospitalization     Updated Attending Provider regarding plan of care.    Triny Crowe, [facility]

## 2023-03-01 NOTE — ED NOTES
Patient arrived to the unit from [place] ED. She is calm and cooperative. She was oriented to unit.

## 2023-03-01 NOTE — TELEPHONE ENCOUNTER
R: Updated Bed Search 2/28  @ 10PM  Per chart review, intake can look [place] the [place] for placement  Locations that have not been updated in the last 12 hrs have been called      [facility] has 0 appropriate beds available. Phone: [phone]  [facility] posting 0 available beds. Phone: [phone] Per call with [facility], at capacity  [facility] posting 0 available beds. Phone: [phone]  [facility] posting 0 available beds. Phone: [phone] per call with [facility], at capacity   [facility] posting 0 available beds. Phone: [phone]  [facility] posting 0 available beds. Phone:[phone] Per call with Jesús @[facility] posting 0 available beds. Phone: [phone]. Negative covid required.  [facility] posting 0 available beds. Phone: [phone]     [place] posting 0 available beds. Phone: [phone] Negative covid required.  [facility] posting 0 available beds. Phone: [phone]  [facility] posting 1 available bed. Phone: [phone]. Low acuity only  [place] posting 0 available beds. Phone: [phone]. No aggression. Negative covid  [facility] posting 0 available beds. Phone: [phone]  [facility] posting 0 available beds. Phone: [phone]   [place] posting 0 available beds. Phone: [phone]. No current aggression.  [facility] posting 0 available beds. Phone:[phone]  [facility] posting 0 available beds. Phone: [phone]. Very low acuity only.  [facility] posting 1 available bed. Phone:[phone] Low acuity, 72HH preferred   [facility] posting 1 available beds. Phone: [phone] Low acuity only per call with Estephanie.    Pt remains on waitlist pending available bed.

## 2023-03-01 NOTE — TELEPHONE ENCOUNTER
Updated Bed Search @ 915AM  Per chart review, intake can look in the metro and one hour south of the metro for placement     [facility] has 0 appropriate beds available. Phone: [phone]  [facility] posting 0 available beds. Phone: [phone]  [facility] posting 0 available beds. Phone: [phone]  [facility] posting 0 available beds. Phone: [phone]  [facility] posting 0 available beds. Phone: [phone]  [facility] posting 0 available beds. Phone:[phone]  [facility] posting 0 available beds. Phone: [phone]. Negative covid required.   [facility] posting 0 available beds. Phone: [phone]   [place] posting 0 available beds. Phone: [phone] Negative covid required. Low acuity only.   [facility] posting 0 available beds. Phone: [phone]  [facility] posting 0 available beds. Phone: [phone]. Need a negative covid. Mixed unit with adolescents, adults, geriatric patients. Age 12 and up. Low acuity referrals only.   [place] posting 0 available beds. Phone: [phone]. No aggression. Negative covid required.     Pt remains on work list pending appropriate bed placement.

## 2023-03-01 NOTE — TELEPHONE ENCOUNTER
0117 Bed Search Update:    [facility]-Website updated 2/28 at 2341 to reflect there are no beds available.  [facility] [place], [place], [facility])-Website update 2/28 at 2341 to reflect there are no beds available.  [facility]-0057 unit reporting they are at capacity.  Check back in AM.  [facility]-Website updated 3/1 at 0003 to reflect there are no beds available.    [facility] [place]-Committed pts only.  Long wait list    Remains on wait list.

## 2023-03-02 ENCOUNTER — TELEPHONE (OUTPATIENT)
Dept: BEHAVIORAL HEALTH | Facility: CLINIC | Age: 48
End: 2023-03-02
Payer: COMMERCIAL

## 2023-03-02 PROCEDURE — 250N000013 HC RX MED GY IP 250 OP 250 PS 637: Performed by: PSYCHIATRY & NEUROLOGY

## 2023-03-02 PROCEDURE — 250N000013 HC RX MED GY IP 250 OP 250 PS 637: Performed by: EMERGENCY MEDICINE

## 2023-03-02 RX ORDER — ARIPIPRAZOLE 5 MG/1
5 TABLET ORAL DAILY
Status: DISCONTINUED | OUTPATIENT
Start: 2023-03-02 | End: 2023-03-03 | Stop reason: HOSPADM

## 2023-03-02 RX ADMIN — TRAZODONE HYDROCHLORIDE 50 MG: 50 TABLET ORAL at 21:05

## 2023-03-02 RX ADMIN — ARIPIPRAZOLE 5 MG: 5 TABLET ORAL at 18:38

## 2023-03-02 ASSESSMENT — ACTIVITIES OF DAILY LIVING (ADL)
ADLS_ACUITY_SCORE: 35

## 2023-03-02 NOTE — ED NOTES
"Writer visited with the patient who is calm , cooperative, slow to respond and soft spoken, anxious depressed, and fearful. Patient seems to live in regret and self embarrassment of her past mistakes. Patient states that she is not doing well \" because I have been ridiculously bad\". Patient further explained that she had not been good to everyone and has betrayed family and friends. Patient was hopeless that is a way out \" to fix the hurt I have caused everyone\".  Patient  currently denies SI/HI/ AVH, pain. Emotional/ therapeutic support provided. Spiritual consult placed earlier today. Prn medication hydroxyzine to be given. Patient encouraged to eat as she has low appetite. Safety precautions in place. Will continue to monitor.   "

## 2023-03-02 NOTE — TELEPHONE ENCOUNTER
0115 Bed Search Update:    [facility]-Website updated 3/1 at 2354 to reflect there are no beds available.  [facility] ([facility] [place], [place], [facility])-Website update 3/1 at 2317 to reflect there are no beds available.  [facility]-0055 unit reporting they are at capacity.  Check back in AM.  [facility]-Website updated 3/2 at 0003 to reflect there are no beds available.    [facility]-Committed pts only.  Long wait list    Remains on wait list.

## 2023-03-02 NOTE — ED NOTES
Patient has slept restfully for the rest of the night. No sleep/behavioral concerns reported/observed. Patient is calm, cooperative, A&O x4. Patient is asleep in her room currently. Safety precautions in place. Will continue to monitor.

## 2023-03-02 NOTE — TELEPHONE ENCOUNTER
R: The patient is currently in the [facility] awaiting placement. Patient is voluntary for metro placement    Intake morning Bed Search (Done at 11:37 AM) Facilities that have not updated their [place] MH access site in the last 12 hours have been contacted for bed availability.     [facility] is posting 0 beds.   [facility] is posting 0 beds.  [facility] is posting 0 beds. 8:15am per Saira, facility at capacity for today.   [facility] is posting 0 beds.  [facility] is posting 0 beds.  [facility] is posting 0 beds.  [facility] is posting 0 beds.  [facility] is posting 0 beds. Low acuity.    [facility] at capacity. The patient remains on the waitlist. Intake continues to identify appropriate bed placement.    3:31 PM Intake called Dr. Peter, patient is accepted for shared female double bed on station 10.     3:42 PM Intake placed patient in queue, sent for insurance benefits and completed indicia.    3:57 PM Intake received call from station 10 that patient cannot admit to a shared bed and will need a private due to paranoia. Intake removed patient from queue as private bed is not available.

## 2023-03-02 NOTE — ED NOTES
Triage & Transition Services, Extended Care    Client Name: Prasanth Nova    Date: March 2, 2023  Service Type:  Group Therapy  Session Start Time:  1110    Session End Time: 1130  Session Length: 20  Site Location: [facility]  Attendees: Patient and other group members  Facilitator: Writer     Topic:   Art Therapy    Intervention:    Group process: support, challenge, affirm, psycho-education.     Response:  Patient did participate in group. Behavior in group was approapriate. Patient shared feeling like she messed up her whole life.       Jessica Belcher LMFT

## 2023-03-02 NOTE — ED NOTES
"Pt presents calm, pleasant, and cooperative. Pt denies SI/HI/SIB and hallucinations stating \"not now\". Pt affect is blunted and behaviorally is guarded and she seemed uninterested in speaking with writer. Pt reports that she didn't sleep well last night, however she did get \"a little sleep\".   VSS, behaviorally in control.   "

## 2023-03-02 NOTE — ED PROVIDER NOTES
[facility] ED Mental Health Handoff Note:       Brief HPI:  This is a 47 year old female signed out to me by Dr. Rosa.  See initial ED Provider note for full details of the presentation. Interval history is pertinent for Extended Care DEC service due to ED boarding. Patient continues to be impaired and paranoid today.    Home meds reviewed and ordered/administered: Yes    Medically stable for inpatient mental health admission: Yes.    Evaluated by mental health: Yes. The recommendation is for inpatient mental health treatment. Bed search in process    Safety concerns: At the time I received sign out, there were no safety concerns.    Hold Status:  Active Orders   N/A       Exam:   Patient Vitals for the past 24 hrs:   BP Temp Temp src Pulse Resp SpO2   03/02/23 0943 119/82 98.1  F (36.7  C) Oral 82 -- 99 %   03/01/23 1503 134/81 97.7  F (36.5  C) Oral 73 16 97 %   03/01/23 1330 (!) 164/93 (!) 95.9  F (35.5  C) Oral 82 16 97 %       ED Course:    Medications   hydrOXYzine (ATARAX) tablet 25 mg (25 mg Oral $Given 3/1/23 2140)   traZODone (DESYREL) half-tab 25-50 mg (25 mg Oral $Given 3/1/23 2140)            There were no significant events during my shift.    Patient was tired of waiting and wanted to leave. Her boyfriend and family wants her to stay due to her decompensated state. I spent 10 minutes convincing her to stay and start a medication. She agrees to start Abilify 5 mg. She agrees to be assessed by Extended Care DEC while she is boarding.      Impression:    ICD-10-CM    1. Hallucinations  R44.3       2. Suicidal thoughts  R45.851           Plan:    1. Awaiting mental health evaluation/recommendations.   2. Psychiatry consult made for tomorrow for ongoing recommendation and consideration for a hold/pettion for commitment option.      RESULTS:   No results found for this visit on 02/28/23 (from the past 24 hour(s)).          MD Philippe Giron, Humble Coburn MD  03/02/23  1304       Humble Paez MD  03/02/23 6401

## 2023-03-02 NOTE — PROGRESS NOTES
Issues of emotional and spiritual vulnerability involved. Patient expressed feeling tired and exhausted, and needing rest. Patient indicated  to visit at a later time.

## 2023-03-03 VITALS
SYSTOLIC BLOOD PRESSURE: 103 MMHG | DIASTOLIC BLOOD PRESSURE: 62 MMHG | RESPIRATION RATE: 20 BRPM | OXYGEN SATURATION: 98 % | HEART RATE: 95 BPM | TEMPERATURE: 97.4 F

## 2023-03-03 PROCEDURE — 99283 EMERGENCY DEPT VISIT LOW MDM: CPT

## 2023-03-03 PROCEDURE — 250N000013 HC RX MED GY IP 250 OP 250 PS 637: Performed by: PSYCHIATRY & NEUROLOGY

## 2023-03-03 RX ORDER — LAMOTRIGINE 25 MG/1
50 TABLET ORAL DAILY
Status: DISCONTINUED | OUTPATIENT
Start: 2023-03-17 | End: 2023-03-03 | Stop reason: HOSPADM

## 2023-03-03 RX ORDER — LAMOTRIGINE 25 MG/1
TABLET ORAL
Qty: 50 TABLET | Refills: 0 | Status: SHIPPED | OUTPATIENT
Start: 2023-03-03 | End: 2024-03-01

## 2023-03-03 RX ORDER — TRAZODONE HYDROCHLORIDE 50 MG/1
50 TABLET, FILM COATED ORAL AT BEDTIME
Qty: 30 TABLET | Refills: 0 | Status: SHIPPED | OUTPATIENT
Start: 2023-03-03 | End: 2024-03-01

## 2023-03-03 RX ORDER — ARIPIPRAZOLE 5 MG/1
5 TABLET ORAL DAILY
Qty: 30 TABLET | Refills: 0 | Status: SHIPPED | OUTPATIENT
Start: 2023-03-03 | End: 2024-03-01

## 2023-03-03 RX ORDER — LAMOTRIGINE 25 MG/1
25 TABLET ORAL DAILY
Status: DISCONTINUED | OUTPATIENT
Start: 2023-03-03 | End: 2023-03-03 | Stop reason: HOSPADM

## 2023-03-03 RX ADMIN — ARIPIPRAZOLE 5 MG: 5 TABLET ORAL at 08:36

## 2023-03-03 ASSESSMENT — COLUMBIA-SUICIDE SEVERITY RATING SCALE - C-SSRS
SUICIDE, SINCE LAST CONTACT: NO
2. HAVE YOU ACTUALLY HAD ANY THOUGHTS OF KILLING YOURSELF?: NO
TOTAL  NUMBER OF ABORTED OR SELF INTERRUPTED ATTEMPTS SINCE LAST CONTACT: NO
ATTEMPT SINCE LAST CONTACT: NO
6. HAVE YOU EVER DONE ANYTHING, STARTED TO DO ANYTHING, OR PREPARED TO DO ANYTHING TO END YOUR LIFE?: NO
TOTAL  NUMBER OF INTERRUPTED ATTEMPTS SINCE LAST CONTACT: NO
1. SINCE LAST CONTACT, HAVE YOU WISHED YOU WERE DEAD OR WISHED YOU COULD GO TO SLEEP AND NOT WAKE UP?: NO

## 2023-03-03 ASSESSMENT — ACTIVITIES OF DAILY LIVING (ADL)
ADLS_ACUITY_SCORE: 35

## 2023-03-03 NOTE — ED NOTES
Pt discharging to home accompanied by boyfriend. Discharge paperwork (AVS) reviewed with pt including medication, follow-up appointment, and resources. Denies SI/HI/SIB/AVH. Pt confirms she feels safe with discharge plan. Vital signs stable. No distress or concerns noted or verbalized.

## 2023-03-03 NOTE — ED NOTES
"Triage & Transition Services, Extended Care     Therapy Progress Note    Patient: Prasanth goes by \"Idpuneet,\" uses she/her pronouns  Date of Service: March 2, 2023  Site of Service: [facility]  Patient was seen in-person.     Presenting problem:   Prasanth is followed related to Long wait time for admission: 40+ hours in the ED. Please see initial DEC/[facility] Crisis Assessment completed by KARIE Edwards on 02/28/23 for complete assessment information. Notable concerns include passive suicidal thoughts, auditory and visual hallucinations, paranoia.     Individuals Present: Prasanth & TO Burch    Session start: 4:00 PM  Session end: 4:30 PM  Session duration in minutes: 30  Session number: 2  Anticipated number of sessions or this episode of care: 1-3  CPT utilized: 69053 - Psychotherapy (with patient) - 30 (16-37*) min    Current Presentation:     Pt presents as visibly depressed. She is curled up in her bed and does not lift her head to speak with this writer. She is slow to respond to this writer's questions. Pt reports some improvement in her vegetative functioning since being in the ED. Pt states that her sleep has improved and that her appetite has increased. Although she ate today, pt notes that this was difficult as she had a stomachache from being away from her family. She notes that she was able to participate in the group today. Pt denies paranoia at present related to people putting stuff in her food. Pt also denies auditory of visual hallucinations.    Pt denies suicidal ideation at present, including thoughts of wanting to fall asleep and not wake-up. When asked what has contributed to the decrease in pt's suicidal ideation, pt reports that she has to live for her family and that suicide is \"selfish\". When asked about contributing factors to pt's depression, she states that she misses her daughter \"so much\". Her daughter is currently attending college in the [place]. She also is fixated on having " "made \"wrong choices\" in her past and needing to show everybody that she can treat people \"right\". When asked for more details, pt is unable to pinpoint anything that she has done wrong outside of sometimes being \"grumpy and stubborn\" and needing to cook dinner more often for her family.     Pt asks to discharge from the hospital. She reports that it was \"selfish\" of her to seek mental healthcare when other people need it more than she does. This writer encourages pt to reframe coming to the hospital as an act of love for her family because she cannot take care of them unless she is taking care of herself. She also has plans with her family that she does not want to miss and does not want to spend her birthday, which is on [birth year], in the hospital. Pt said that she already spoken to her boyfriend about discharging and that he also wants her to leave. When asked what she would do if her suicidal ideation increased post-discharge, she said she would call a crisis line.     Pt states that she has a therapist and a psychiatrist. Her psychiatrist is Dea (unknown last name) at [facility] in [place]. Pt reports that she is taking psychiatric medication consistently. This writer introduced the possibility of partial hospitalization programming for pt to participate in post-discharge. Pt expressed interest in this programming.     Mental Status Exam:   Appearance: awake, alert  Attitude: cooperative  Eye Contact: good  Mood: anxious, sad  and depressed  Affect: appropriate and in normal range  Speech: clear, coherent  Psychomotor Behavior: no evidence of tardive dyskinesia, dystonia, or tics  Thought Process:  linear  Associations: no loose associations  Thought Content: obsessions present  Insight: fair  Judgement: fair  Oriented to: time, person, and place  Attention Span and Concentration: intact  Recent and Remote Memory: intact    Diagnosis:   311 (F32.9) Unspecified Depressive Disorder - by " history  300.02 (F41.1) Generalized Anxiety Disorder - by history    Therapeutic Intervention(s):   Provided active listening, unconditional positive regard, and validation. Engaged in safety planning.  Engaged in cognitive restructuring/ reframing, looked at common cognitive distortions and challenged negative thoughts.    Treatment Objective(s) Addressed:   The focus of this session was on rapport building, orienting the patient to therapy and processing feelings related to continuing to wait in the ED for inpatient admission.    Progress Towards Goals:   Patient reports improving symptoms. Patient is making progress towards treatment goals as evidenced by denying suicidal ideation at present. However, of note, it is possible that pt is minimizing symptoms given her stated goal of discharge. Pt has previously worked as a mental health therapist and has no knowledge about what mental health symptoms would warrant hospitalization.     Case Management:   This writer spoke to pt's boyfriend Newton Vickers in-person. Newton stated that he does not want pt to discharge as her mental health has been decompensating. He reports that she left the house on Monday or Tuesday of this week for no apparent reason and without telling him where she was going. He notes that she was previously hospitalized with a similar presentation in April 2019. She stopped psychiatric medication and attending therapy soon after her inpatient admission, resulting in a gradual decline in her mental health over the past 3.5 years. Newton said that she has been talking with pt about need for her to stay in the hospital but pt does not seem able to take in this information.     This writer provided an update to pt's sister Bertha at [phone]. This writer informed Bertha that pt is still awaiting an inpatient bed in the [facility]. Bertha asked if family should fly into town to support pt. This writer told Bertha that this would be most helpful for pt around the  "time that she discharges from the hospital. Bertha asked about pt's presenting symptoms and this writer provided a brief overview to Bertha about why pt is in the hospital.    This writer had Dr. Paez speak to pt about disposition. Dr. Paez said that pt is potentially holdable. However, Dr. Paez was able to convince pt to stay voluntarily by starting her on psychiatric medication while here. Dr. Paez also requested that this writer placed a psychiatry consult for pt, which this writer did.    General Recommendations:   Continue to monitor for harm. Consider: Increase frequency of staff rounding, Allow family calls/visits and Listen in a neutral, non-judgmental way. Offer reassurance    Plan:   Inpatient Mental Health: Pt continues to present with symptoms of depression. She is fixated on making \"wrong choices\" and not doing \"right\" by others. As recent as yesterday, pt was exhibiting paranoia and responding to internal stimuli. Inpatient admission continues to be warranted to offer further stabilization for pt.    Plan for Care reviewed with Assigned Medical Provider? Yes. Provider, Dr. Paez, response: in agreement with inpatient admission, placed psychiatry consult at provider's request     Shahla Encarnacion [facility]   Licensed Mental Health Professional (LMHP), [facility]  [phone]        "

## 2023-03-03 NOTE — ED NOTES
"Pt presents calm, pleasant, and cooperative this shift. Pt denies SI/HI/SIB and hallucinations and reports she is \"doing okay\". Pt originally discussed wanting to discharge throughout the day, but after speaking with a doctor was agreeable to staying on a voluntary basis while working on medication stabilization.  VSS, med compliant, behaviorally in control.   "

## 2023-03-03 NOTE — ED NOTES
Patient's SO called [facility] # asking why no one is giving him or family updates. Writer explained there are ROIs for family members but not him. Writer obtained LILIA for patient's SO. SO expressed frustration that no one is giving him or family updates. Writer explained there are ROIs for family members and him now, so updates can be given. SO informed writer that he will be at [facility] today at 12pm to get situation figured out.

## 2023-03-03 NOTE — DISCHARGE INSTRUCTIONS
Aftercare Plan    Follow up with established providers and supports as scheduled. Continue taking medications as prescribed. Abstain from drugs and alcohol. Utilize your [facility] mental [facility] crisis team as needed. They are available 24/7. Contact information is listed below.     The following appointment(s) and/or referral(s) were made on your behalf. If you need to make changes or cancel please contact the clinic/provider directly.     Psychiatry Appointment    Date: Tuesday, 3/7/2023  Time: 8:00 am - 9:00 am  VIRTUAL  Provider: Villa Curtis MS, CNP,RN  Location: [facility], [address], [address], [place] [address]  Phone: [phone]  Type: Telepsychiatry    Please fill out the New Patient Form by using the following link. All forms need to be completed 24 hours prior to the appointment date/time by going to [email or web address] Please call us at [phone] 24 hours prior to your scheduled appointment to confirm that you are able to attend. We will provide you information about how to log into video call software when you call.    Remember: give the referrals 3 sessions prior to calling it quits. Do you trust them? Do you feel understood? Do you think they can help? Check in with yourself after each session    If I am feeling unsafe or I am in a crisis, I will:   Contact my established care providers   Call the National Suicide Prevention Lifeline: 814.344.8037   Go to the nearest emergency room   Call 911     Warning signs that I or other people might notice when a crisis is developing for me: changes to sleep, appetite or mood, increased anger, agitation or irritability, feeling depressed or hopeless, spending more time alone or talking less, increased crying, decreased productivity, seeing or hearing things that aren't there, thoughts of not wanting to live anymore or of actually killing myself, thoughts of hurting others    Things I am able to do on my own to  cope or help me feel better: watching a favorite tv show or movie, listening to music I enjoy, going outside and breathing fresh air, going for a walk or exercising, taking a shower or bath, a cold or hot beverage, a healthy snack, drawing/coloring/painting, journaling, singing or dancing, deep breathing     I can try practicing square breathing when I begin to feel anxious - inhale through the nose for the count of 4 and the first line on the square. Exhale through the mouth for the count of 4 for the second line of the square. Repeat to complete the square. Repeat the square as many times as needed.    I can also use my five senses to practice mindfulness and grounding. What are five things I can see, four things I can hear, three things I can feel, two things I can smell, and one thing I can taste.     Things that I am able to do with others to cope or help me feel better: sometimes just talking or spending time with someone else, sharing a meal or having coffee, watching a movie or playing a game, going for a walk or exercising    I can also use community resources including mental health hotlines, [facility] crisis teams, or apps.     Things I can use or do for distraction: movies/tv, music, reading, games, drawing/coloring/painting or other art, essential oils, exercise, cleaning/organizing, puzzles, crossword puzzles, word search, Sudoku       I can also download a meditation or relaxation ann-marie, like Calm, Headspace, or Insight Timer (all three offer a free version)    Changes I can make to support my mental health and wellness: Attend scheduled mental health therapy and psychiatric appointments. Take my medications as prescribed. Maintain a daily schedule/routine. Abstain from all mood altering substances, including drugs, alcohol, or medications not currently prescribed to me. Implement a self-care routine.      People in my life that I can ask for help: friends or family, trusted teachers/staff/colleagues,  "trusted members of my community or place of [employer], mental health crisis lines, or 911    Your county has a mental health crisis team you can call 24/7: [facility], [phone]    Other things that are important when I m in crisis: to remember that the feelings I am having right now are temporary, and it won't feel like this forever, and that it is okay and important to ask for help    Crisis Lines  Crisis Text Line  Text 894486  You will be connected with a trained live crisis counselor to provide support.    Por espanol, texto  NEFTALI a 484862 o texto a 442-AYUDAME en WhatsApp    National Hope Line  1.800.SUICIDE [4021406]      Community Resources  Fast Tracker  Linking people to mental health and substance use disorder resources  [employer].UYA100     [place] Mental [facility] Warm Line  Peer to peer support  Monday thru Saturday, 12 pm to 10 pm  351.647.6379 or 2.280.968.1437  Text \"Support\" to 31160    National [place] on Mental Illness (DARIEL)  350.679.4697 or 1.888.DARIEL.HELPS      Mental Health Apps  My3  https://myReify Healthpp.org/    VirtualHopeBox  https://Sedicii.org/apps/virtual-hope-box/      Additional Information  Today you were seen by a licensed mental health professional through Triage and Transition services, [facility] ([facility])  for a crisis assessment in the Emergency Department at [facility].  It is recommended that you follow up with your established providers (psychiatrist, mental health therapist, and/or primary care doctor - as relevant) as soon as possible. Coordinators from [facility] will be calling you in the next 24-48 hours to ensure that you have the resources you need.  You can also contact [facility] coordinators directly at [phone]. You may have been scheduled for or offered an appointment with a mental health provider. [facility] maintains an extensive network of licensed behavioral health providers to connect patients with the services they need.  We do not " charge providers a fee to participate in our referral network.  We match patients with providers based on a patient's specific needs, insurance coverage, and location.  Our first effort will be to refer you to a provider within your care system, and will utilize providers outside your care system as needed.

## 2023-03-03 NOTE — ED NOTES
Triage & Transition Services, Extended Care    Client Name: Prasanth Nova    Date: March 2, 2023  Service Type:  Group Therapy  Session Start Time:  5:30P    Session End Time: 5:40P  Session Length: 10min  Site Location: [facility]  Attendees: Patient and other group members  Facilitator: Jazmin Katz     Topic:   Writing Letters of Appreciation    Intervention:    Group process: support, challenge, affirm, psycho-education.     Response:  Patient did participate in group. Behavior in group was appropriate and somewhat teary. Patient shared her letter to her daughter that included words of encouragement. Patient shared that she would want to write a letter to her significant other as well. Pt shared struggles and areas of growth she tries to work on.       Jazmin Katz

## 2023-03-03 NOTE — CONSULTS
"  Idza NICOLE Nova MRN# [id number]   Age: 47 year old YOB: 1975   Date of Admission to ED: 2/28/2023    In person visit Details:     Patient was assessed and interviewed face-to-face in person with this writer denys. Patient was observed to be able to participate in the assessment as evidenced by verbal consent. Assessment methods included conducting a formal interview with patient, review of medical records, collaboration with medical staff, and obtaining relevant collateral information from family and community providers when available.        Reason for Consult:     Psychiatric consult was requested by ED provider and [facility], for medication management  Patient is alert and oriented x4, very pleasant and cooperative during assessment and interview.  Patient denied any suicidal ideation or homicidal ideation or self injury behavior.  Patient described her mood as up and down when she was home, saying \" sometimes I feel irritable, have outburst of energy, anger and poor judgment, spending spree, but most of the time I feel depressed and sad.\"  Patient describes her mood mostly down as she described \" feeling of worthlessness, baseline suicidal ideation, visual and auditory hallucination.\"  Patient said currently she feeling good medication she received while in the emergency room is working okay, willing to follow-up outpatient psychiatry and therapy, agreed to continue to take her aripiprazole 5 mg daily and start lamotrigine 25 mg titrated up to 50 mg and to follow-up with her outpatient psychiatry.    Lamotrigine side effect such as Hummel-Stoney syndrome and the rash which is serious side effect was explained to the patient patient understand willing to take this medication and follow outpatient psychiatry.  Patient was informed about aripiprazole benefit and side effect the  of possible adverse effects including, but not limited to: akathisia, extrapyramidal symptoms, dystonia, drowsiness, and " "long term concerns for tardive dyskinesia, weight gain, insulin resistance, dyslipidemia, and cognitive slowing. The patient expressed understanding. Alternatives to this medication were also discussed with the patient, including risks of not taking medications, and the patient was agreeable to the above choice.    Patient does not want to be inpatient mental health unit as she said \" I would like to be discharged, and celebrate my upcoming birthday with my family and friends\" patient states she have a therapy in which she see weekly basis, and that she have psychiatrist which she sees regularly.  Writer attempted to call patient his significant other Newton twice number on the chart was wrong.     Patient grossly appears to be cognitively intact. Insight and judgement have improved since admission. Patient is aware of consequences of medication non-compliance .  Patient has not exhibited aggressive or violence behaviors since prior to this admission. Has notable risk factors for self-harm, including substance use and trauma, past history of attempt. However, risk is mitigated by ability to volunteer a safety plan and history of seeking help when needed. Patient does not have immediate access to firearms. Therefore, based on all available evidence including the factors cited above, she does not appear to be at imminent risk for self-harm, does not meet criteria for a 72-hr hold, and therefore remains appropriate for ongoing outpatient level of care. Patient agreed to further reduce risk of self-harm by adhering to the safety plan and agreed to remain medication adherent. Additional steps taken to minimize risk include: medication optimization, close psychiatric follow up and provision of crisis resources. Patient expressed understanding of risk associated with medication non-adherence including increased risk of harm to self or others.       I have reviewed the nursing notes. I have reviewed the findings, diagnosis, " "plan and need for follow up with the patient.         HPI:     Per ED provider Dr. Rakesh Pettit note  Prasanth Nova is a 47 year old female with past medical history significant for major depressive disorder with psychotic features who presents to the emergency department tonight with her boyfriend with concerns for hallucinations and emotional instability.     In speaking with the patient in private, she tells me that over the last several weeks she has been having difficulties with decreased appetite, feeling unsafe at home, and passive thoughts of suicidality.  Patient states to me that she has no current plan, and has no history of attempted suicide in the past.  She states that she does not want to harm herself.  Patient tells me that she is having some auditory hallucinations which she tells me is \"her own voice\", which is giving her difficulties in giving her stress.  Patient states that she feels she is a burden to her boyfriend, and is keeping her boyfriend up at night due to her anxiety and stress.  Patient states that she feels emotionally unstable at home.  She has been taking her medications as prescribed, per EMR and patient report patient is on risperidone 0.5 mg twice daily as well as Remeron 15 mg nightly.  Per boyfriend patient has been on this now for about 3 weeks.  Patient tells me she is not using any recreational drugs or alcohol.  Patient states she has been admitted in the past for mental health difficulties, would like to be admitted today.     In private patient's boyfriend tells me that they were seen earlier this morning by a provider who manages patient's psych medications.  At that point patient had reported hallucinations, auditory, and she was prompted to come to the ED right away for further evaluation recommended admission.        Pt has not required locked seclusion or restraints in the past 24 hours to maintain safety, please refer to RN documentation for further " details.  Substance use does not appear to be playing a contributing role in the patient's presentation.  Brief Therapeutic Intervention(s):   Provided active listening, unconditional positive regard, and validation. Engaged in cognitive restructuring/ reframing, looked at common cognitive distortions and challenged negative thoughts. Engaged in guided discovery, explored patient's perspectives and helped expand them through socratic dialogue. Provided positive reinforcement for progress towards goals, gains in knowledge, and application of skills previously taught.  Engaged in social skills training. Explored and identified early warning signs to anger.        Past Psychiatric History:   2019 where she was admitted for 1 week for depression with psychosis at [place]        Substance Use and History:     311 (F32.9) Unspecified Depressive Disorder - by history  300.02 (F41.1) Generalized Anxiety Disorder - by history  Bipolar type II      Past Medical History:   PAST MEDICAL HISTORY:   Past Medical History:   Diagnosis Date     Anxiety      Depressive disorder      Uncomplicated asthma        PAST SURGICAL HISTORY:   Past Surgical History:   Procedure Laterality Date      SECTION       ORTHOPEDIC SURGERY                 Allergies:     Allergies   Allergen Reactions     Peanut (Diagnostic)      Other reaction(s): Throat Swelling/Closing  Tight throat     Nuts      Tight throat     Shrimp      Tight throat             Medications:     I have reviewed this patient's current medications  Current Facility-Administered Medications   Medication     ARIPiprazole (ABILIFY) tablet 5 mg     hydrOXYzine (ATARAX) tablet 25 mg     lamoTRIgine (LaMICtal) tablet 25 mg    Followed by     [START ON 3/17/2023] lamoTRIgine (LaMICtal) tablet 50 mg     traZODone (DESYREL) half-tab 25-50 mg     Current Outpatient Medications   Medication Sig     hydrOXYzine (ATARAX) 25 MG tablet Take 25 mg by mouth every morning      sertraline (ZOLOFT) 25 MG tablet Take 1 tablet by mouth every morning              Family History:   FAMILY HISTORY:   Family History   Problem Relation Age of Onset     Depression Mother      Depression Sister            Social History:   SOCIAL HISTORY:   Social History     Tobacco Use     Smoking status: Never     Smokeless tobacco: Never   Substance Use Topics     Alcohol use: Yes     Comment: occasionally            PTA Medications:   (Not in a hospital admission)         Allergies:     Allergies   Allergen Reactions     Peanut (Diagnostic)      Other reaction(s): Throat Swelling/Closing  Tight throat     Nuts      Tight throat     Shrimp      Tight throat          Labs:   No results found for this or any previous visit (from the past 48 hour(s)).       Physical and Psychiatric Examination:     /77   Pulse 81   Temp 97.9  F (36.6  C) (Oral)   Resp 16   LMP 02/06/2023   SpO2 98%   Weight is 0 lbs 0 oz  There is no height or weight on file to calculate BMI.    Mental Status Exam:  Appearance: awake, alert  Attitude:  cooperative  Eye Contact:  good  Mood:  better  Affect:  appropriate and in normal range  Speech:  clear, coherent  Language: fluent and intact in English  Psychomotor, Gait, Musculoskeletal:  no evidence of tardive dyskinesia, dystonia, or tics  Thought Process:  logical and linear  Associations:  no loose associations  Thought Content:  no evidence of suicidal ideation or homicidal ideation  Insight:  good  Judgement:  intact  Oriented to:  time, person, and place  Attention Span and Concentration:  intact  Recent and Remote Memory:  intact  Fund of Knowledge:  appropriate         Diagnoses:      Hallucinations  Suicidal thoughts         Recommendations:   1.  DC per P and the ED provider, patient can follow her outpatient psychiatry and therapy, or PHP program  2.  Start Abilify 5 mg daily 1 month supply, lamotrigine 25 mg for 14 days and 50 mg for 14 days, trazodone 50 mg for 1 month  supply  at bedtime  3.  Outpatient psychiatry and therapy  4.  Consult psychiatry as needed    - Consulted with  [facility] Kimberly DOUGLAS [facility] ED physician Dr. Paez, patient's nurse , Tayla MELENDEZ regarding this case.    Please call [facility]/DEC at [phone] if you have follow-up questions or wish to place another consult.  Karla Stahl, Psychiatric Nurse practitioner    Attestation:  Time with:  Patient: 30  minutes  Treatment Team: 20 Minutes  Chart Review: 30 minutes    Total time spent was 80 minutes. Over 50% of times was spent counseling and coordination of care.    I, Karla Stahl, CNP, APRN, Psychiatric Nurse Practitioner have personally performed an examination of this patient.  I have edited the note to reflect all relevant changes.  I have discussed this patient with the care team March 3, 2023.  I have reviewed all vitals and laboratory findings.    Disclaimer: This note consists of symbols derived from keyboarding,    ;

## 2023-03-03 NOTE — ED NOTES
[facility] Crisis Reassessment      Prasanth Nova was reassessed at the request of attending provider for the following reasons: discharge planning. Pt was first seen on 02/28/23 by KARIE Edwards; see the initial assessment note for details.      Patient Presentation    Initial ED presentation details: Pt presented to the ED with passive thoughts of suicide, increased paranoia about people watching her and putting stuff in her food, auditory hallucinations, and perseveration about being a bad person and family member.    Current patient presentation:     Pt is observed lying in her hospital bed resting. She appears to have more energy today and wants to shower. Pt states that she feels better and is looking forward to discharging home to be with family. Pt denies thoughts of suicide, including wanting to fall asleep and not wake-up. She acknowledges that she has recently experienced auditory hallucinations of the voices of friends, family members, and other people that she does not know. She states that the voices felt real to her. She does not remember when she last heard these voices but denies hearing them at present. Pt denies homicidal ideation.    When asked what coping skills pt can access in the community, pt states that she can try to keep busy and stay active. She wants to spend time with family and is thinking about exercising, trying to start working again, and being more involved at [employer]. When asked what pt will do if she feels she is an acute mental health crisis, pt states that she will talk to her friends and family or call a crisis line.     Pt reports having weekly therapy appointments through [facility] in [place]. Her next therapy appointment is 3/9/23 at 10 AM. She was being seen for medication management through [facility] in [place] but does not know if she is still a current pt. This writer obtained an LILIA for pt for [facility] and will  schedule a medication management appointment for her. This writer discussed PHP programming with pt. She declines PHP services at this time.    Changes observed since initial assessment: At present, pt denies any suicidal ideation, including thoughts of wanting to fall asleep and not wake-up. She denies any current hallucinations. Pt is requesting to discharge home to family. Pt had a psychiatry consult earlier this morning with recommendation of pt discharging home with mental health supports in place.      Risk of Harm    [place] Suicide Severity Rating Scale Since Last Contact: 03/03/23  Suicidal Ideation (Since Last Contact)  1. Wish to be Dead (Since Last Contact): No  2. Non-Specific Active Suicidal Thoughts (Since Last Contact): No  Suicidal Behavior (Since Last Contact)  Actual Attempt (Since Last Contact): No  Has subject engaged in non-suicidal self-injurious behavior? (Since Last Contact): No  Interrupted Attempts (Since Last Contact): No  Aborted or Self-Interrupted Attempt (Since Last Contact): No  Preparatory Acts or Behavior (Since Last Contact): No  Suicide (Since Last Contact): No     C-SSRS Risk (Since Last Contact)  Calculated C-SSRS Risk Score (Since Last Contact): No Risk Indicated    Validity of evaluation is not impacted by presenting factors during interview.   Comments regarding subjective versus objective responses to [place] tool: none  Environmental or Psychosocial Events: work or task failure, challenging interpersonal relationships and neither working nor attending school  Chronic Risk Factors: history of psychiatric hospitalization and serious, persistent mental illness   Warning Signs: none identified  Protective Factors: strong bond to family unit, community support, or employment, intact marriage or domestic partnership, responsibilities and duties to others, including pets and children, lives in a responsibly safe and stable environment, good treatment engagement, sense of  importance of health and wellness, supportive ongoing medical and mental health care relationships, cultural, spiritual , or Confucianist beliefs associated with meaning and value in life, optimistic outlook - identification of future goals and constructive use of leisure time, enjoyable activities, resilience  Interpretation of Risk Scoring, Risk Mitigation Interventions and Safety Plan:  Pt denies suicidal ideation at present, including thoughts of wanting to fall asleep and not wake-up.    Does the patient have thoughts of harming others? No    Mental Status Exam   Affect: Blunted   Appearance: Disheveled    Attention Span/Concentration: Attentive?    Eye Contact: Engaged   Fund of Knowledge: Appropriate    Language /Speech Content: Fluent   Language /Speech Volume: Soft    Language /Speech Rate/Productions: Normal    Recent Memory: Intact   Remote Memory: Intact   Mood: Anxious    Orientation to Person: Yes    Orientation to Place: Yes   Orientation to Time of Day: Yes    Orientation to Date: Yes    Situation (Do they understand why they are here?): Yes    Psychomotor Behavior: Normal    Thought Content: Clear   Thought Form: Intact       Additional Collateral Information   Pt is discharging home with her boyfriend. This writer reviewed pt's discharge plan with boyfriend while in the room with pt including follow-up therapy and psychiatry appointments, returning to the ED if mental health symptoms increase in severity, and utilizing crisis lines as needed. When asked, pt's boyfriend reports no questions about the discharge plan.     Therapeutic Intervention  The following therapeutic methodologies were employed when working with the patient: Active listening, Identify additional supports and alternative coping skills and Establish a discharge plan. Patient response to intervention: Pt answered this writer's questions about her mental health symptoms and engaged with this writer in discharge planning.    Diagnosis:    311 (F32.9) Unspecified Depressive Disorder - by history  300.02 (F41.1) Generalized Anxiety Disorder - by history     Clinical Substantiation of Recommendations  After therapeutic assessment, intervention and aftercare planning by ED care team and [facility] and in consultation with attending provider, the patient's circumstances and mental state were safe for outpatient management. Pt denies suicidal ideation or hallucinations at this time. The patient was discharged. Close follow-up with a psychiatrist and/or therapist was recommended and community psychiatric resources were provided. Patient is to return to the ED if any urgent or potentially life-threatening concerns arise.       At the time of discharge, the patient's acute suicide risk was determined to be low due to the following factors: reduction in the intensity of mood/anxiety symptoms that preceded the admission, denial of suicidal thoughts, denies feeling helpless or hopeless, not currently under the influence of alcohol or illicit substances and denies experiencing command hallucinations. Protective factors include: social support, established relationship community mental health provider(s), geremias system, future focused thinking, expresses desire to engage in treatment, sense of obligation to people/pets and safe/stable housing     Plan:    Disposition  Recommended disposition: Individual Therapy, Medication Management and Programmatic Care: PHP      Reviewed case and recommendations with attending provider. Attending Name: Dr. Paez      Attending concurs with disposition: Yes      Patient concurs with disposition: No: pt not interested in PHP at this time      Final disposition: Individual therapy  and Medication management.         Assessment Details  Total duration spent on the patient case in minutes: 1.0 hrs     CPT code(s) utilized: 70278 - Psychotherapy for Crisis (Each additional 30 minutes) - 30 min        TO [place],  [facility]  Callback: [phone]      Aftercare Plan    Follow up with established providers and supports as scheduled. Continue taking medications as prescribed. Abstain from drugs and alcohol. Utilize your [facility] mental [facility] crisis team as needed. They are available 24/7. Contact information is listed below.     The following appointment(s) and/or referral(s) were made on your behalf. If you need to make changes or cancel please contact the clinic/provider directly.     Psychiatry Appointment    Date: Tuesday, 3/7/2023  Time: 8:00 am - 9:00 am  VIRTUAL  Provider: Villa Curtis MS, CNP,RN  Location: [facility], [address], [address], [place] [address]  Phone: [phone]  Type: Telepsychiatry    Please fill out the New Patient Form by using the following link. All forms need to be completed 24 hours prior to the appointment date/time by going to [email or web address] Please call us at [phone] 24 hours prior to your scheduled appointment to confirm that you are able to attend. We will provide you information about how to log into video call software when you call.    Remember: give the referrals 3 sessions prior to calling it quits. Do you trust them? Do you feel understood? Do you think they can help? Check in with yourself after each session    If I am feeling unsafe or I am in a crisis, I will:   Contact my established care providers   Call the National Suicide Prevention Lifeline: 387.828.3928   Go to the nearest emergency room   Call 911     Warning signs that I or other people might notice when a crisis is developing for me: changes to sleep, appetite or mood, increased anger, agitation or irritability, feeling depressed or hopeless, spending more time alone or talking less, increased crying, decreased productivity, seeing or hearing things that aren't there, thoughts of not wanting to live anymore or of actually killing myself, thoughts of hurting  others    Things I am able to do on my own to cope or help me feel better: watching a favorite tv show or movie, listening to music I enjoy, going outside and breathing fresh air, going for a walk or exercising, taking a shower or bath, a cold or hot beverage, a healthy snack, drawing/coloring/painting, journaling, singing or dancing, deep breathing     I can try practicing square breathing when I begin to feel anxious - inhale through the nose for the count of 4 and the first line on the square. Exhale through the mouth for the count of 4 for the second line of the square. Repeat to complete the square. Repeat the square as many times as needed.    I can also use my five senses to practice mindfulness and grounding. What are five things I can see, four things I can hear, three things I can feel, two things I can smell, and one thing I can taste.     Things that I am able to do with others to cope or help me feel better: sometimes just talking or spending time with someone else, sharing a meal or having coffee, watching a movie or playing a game, going for a walk or exercising    I can also use community resources including mental health hotlines, [facility] crisis teams, or apps.     Things I can use or do for distraction: movies/tv, music, reading, games, drawing/coloring/painting or other art, essential oils, exercise, cleaning/organizing, puzzles, crossword puzzles, word search, Sudoku       I can also download a meditation or relaxation ann-marie, like Calm, Headspace, or Insight Timer (all three offer a free version)    Changes I can make to support my mental health and wellness: Attend scheduled mental health therapy and psychiatric appointments. Take my medications as prescribed. Maintain a daily schedule/routine. Abstain from all mood altering substances, including drugs, alcohol, or medications not currently prescribed to me. Implement a self-care routine.      People in my life that I can ask for help: friends or  "family, trusted teachers/staff/colleagues, trusted members of my community or place of Latter day, mental health crisis lines, or 911    Your [facility] has a mental health crisis team you can call 24/7: [facility], [phone]    Other things that are important when I m in crisis: to remember that the feelings I am having right now are temporary, and it won't feel like this forever, and that it is okay and important to ask for help    Crisis Lines  Crisis Text Line  Text 905118  You will be connected with a trained live crisis counselor to provide support.    Por espanol, texto  NEFTALI a 021933 o texto a 442-AYUDAME en WhatsApp    National Hope Line  1.800.SUICIDE [0879316]      Community Resources  Fast Tracker  Linking people to mental health and substance use disorder resources  [employer]n.Gorb     [place] Mental Health Warm Line  Peer to peer support  Monday thru Saturday, 12 pm to 10 pm  652.337.6676 or 3.003.273.0511  Text \"Support\" to 29074    National [place] on Mental Illness (DARIEL)  704.788.1380 or 1.888.DARIEL.HELPS      Mental Health Apps  My3  https://my[employer] REIpp.org/    VirtualHopeBox  https://[employer].org/apps/virtual-hope-box/      Additional Information  Today you were seen by a licensed mental health professional through Triage and Transition services, [facility] ([facility])  for a crisis assessment in the Emergency Department at [facility].  It is recommended that you follow up with your established providers (psychiatrist, mental health therapist, and/or primary care doctor - as relevant) as soon as possible. Coordinators from [facility] will be calling you in the next 24-48 hours to ensure that you have the resources you need.  You can also contact [facility] coordinators directly at [phone]. You may have been scheduled for or offered an appointment with a mental health provider. [facility] maintains an extensive network of licensed behavioral health providers to connect patients " with the services they need.  We do not charge providers a fee to participate in our referral network.  We match patients with providers based on a patient's specific needs, insurance coverage, and location.  Our first effort will be to refer you to a provider within your care system, and will utilize providers outside your care system as needed.

## 2023-03-03 NOTE — TELEPHONE ENCOUNTER
0251 Bed Search Update:    [facility]- 3/3 at 0155 [place] reporting there are no beds available.  [facility] ([facility] [place], [place], [place], [facility])-Website updated 3/2 at 2033 to reflect there are no beds available.  [facility]-0125 unit reporting they are at capacity.  Check back in AM.  [facility]-Website updated 3/3 at 0043 to reflect there are no beds available.    [facility]-Website updated 3/3 at 0002 to reflect there are no beds available.  Meets exclusionary criteria.  Low acuity only.  Mixed unit adol/adult/kanu  [place] ([place], [place], [place])-Website updated 3/2 at 2159 and 3/3 at 0058 to reflect no availability at [place] and [place].  Meets exclusionary criteria for [place].  [facility]-Meets exclusionary criteria.  No recent hx violence/aggression. Must be able to program in groups.    Remains on wait list.

## 2023-03-03 NOTE — ED NOTES
Patient slept restfully throughout the night. No signs of distress, or difficulty breathing reported or observed. Patient is calm, cooperative, alert and oriented. No behavioral concerns noted. Patient is sleeping in her bed at this moment. Safety precautions in place. Will continue to monitor.

## 2023-03-03 NOTE — TELEPHONE ENCOUNTER
No appropriate beds within [place].  Bed search update 6:15PM     [facility]: @ cap per website-Per Kary, at [facility]  [facility]: @ cap per website - Per Marisela, at [facility]: @ cap per website- Per Viky, at [facility]: @ cap per website.  Per Marisela, at [facility]: @ cap [facility]: @ cap per website.  - Per Marisela, at [facility]: @ cap per website  [facility]:  @ cap per website - Per Mairsela, at [facility]: 1 bed posted Mixed unit with children.   [facility]: @ cap per website  [facility]: @ cap per website  [facility]: @ cap per website. Per Marisela, at [facility]: @ cap per website  [facility]: @ cap per website.  [facility]: 2 beds posted.  [facility]: 5 LOW ACUITY beds available.  [facility]: @ cap per website  [facility]: 6 LOW ACUITY beds available  [facility]:  2 LOW ACUITY beds available per website.  [facility]: 2 LOW ACUITY beds available.  Not appropriate due to acuity  [facility]: 1 bed available.  Voluntary pts only.  [facility]: 6 beds posted.    Out of state.[facility]: 4 LOW ACUITY bed posted.     Pt remains on intake work list awaiting appropriate placement.

## 2023-03-03 NOTE — ED PROVIDER NOTES
[facility] ED Mental Health Handoff Note:       Brief HPI:  This is a 47 year old female signed out to me by the previous ED provider.  See initial ED Provider note for full details of the presentation. Interval history is pertinent for [facility] involvement. Psych consult today recommends continuing with Abilify 5 mg, adding lamictal 25 mg daily x 2 weeks, then 50 mg daily x 2 weeks, and trazodone 50 mg HS. Patient can be discharged home per her request. She is deemed not holdable.    Home meds reviewed and ordered/administered: Yes    Medically stable for inpatient mental health admission: Yes.    Evaluated by mental health: Yes. The recommendation is for outpatient mental health treatment. Resources and plan given to patient.    Safety concerns: At the time I received sign out, there were no safety concerns.    Hold Status:  Active Orders   N/A       Exam:   Patient Vitals for the past 24 hrs:   BP Temp Temp src Pulse Resp SpO2   03/03/23 0846 114/77 97.9  F (36.6  C) Oral 81 16 98 %   03/02/23 2126 -- 98.5  F (36.9  C) Oral 65 -- 96 %       ED Course:    Medications   hydrOXYzine (ATARAX) tablet 25 mg (25 mg Oral $Given 3/1/23 2140)   traZODone (DESYREL) half-tab 25-50 mg (50 mg Oral $Given 3/2/23 2105)   ARIPiprazole (ABILIFY) tablet 5 mg (5 mg Oral $Given 3/3/23 0836)   lamoTRIgine (LaMICtal) tablet 25 mg (has no administration in time range)     Followed by   lamoTRIgine (LaMICtal) tablet 50 mg (has no administration in time range)            There were no significant events during my shift.      Impression:    ICD-10-CM    1. Hallucinations  R44.3       2. Suicidal thoughts  R45.851           Plan:    1. Discharged.      RESULTS:   Results for orders placed or performed during the hospital encounter of 02/28/23 (from the past 24 hour(s))   Psychiatry IP Consult: attending provider requested a review of pt's psychiatric medications; Consultant may enter orders: Yes; Requesting provider? Other  "supervising provider; Name: Polly Richard     Status: None ()    Collection Time: 03/02/23  7:26 PM    Narrative    Karla Stahl, APRN CNP     3/3/2023 12:32 PM    Prasanth Nova MRN# [id number]   Age: 47 year old YOB: 1975   Date of Admission to ED: 2/28/2023    In person visit Details:     Patient was assessed and interviewed face-to-face in person with   this writer a. Patient was observed to be able to participate in   the assessment as evidenced by verbal consent. Assessment methods   included conducting a formal interview with patient, review of   medical records, collaboration with medical staff, and obtaining   relevant collateral information from family and community   providers when available.        Reason for Consult:     Psychiatric consult was requested by ED provider and [facility], for   medication management  Patient is alert and oriented x4, very pleasant and cooperative   during assessment and interview.  Patient denied any suicidal   ideation or homicidal ideation or self injury behavior.  Patient   described her mood as up and down when she was home, saying \"   sometimes I feel irritable, have outburst of energy, anger and   poor judgment, spending spree, but most of the time I feel   depressed and sad.\"  Patient describes her mood mostly down as   she described \" feeling of worthlessness, baseline suicidal   ideation, visual and auditory hallucination.\"  Patient said   currently she feeling good medication she received while in the   emergency room is working okay, willing to follow-up outpatient   psychiatry and therapy, agreed to continue to take her   aripiprazole 5 mg daily and start lamotrigine 25 mg titrated up   to 50 mg and to follow-up with her outpatient psychiatry.    Lamotrigine side effect such as Hummel-Stoney syndrome and the   rash which is serious side effect was explained to the patient   patient understand willing to take this medication and follow " "  outpatient psychiatry.  Patient was informed about aripiprazole   benefit and side effect the  of possible adverse effects   including, but not limited to: akathisia, extrapyramidal   symptoms, dystonia, drowsiness, and long term concerns for   tardive dyskinesia, weight gain, insulin resistance,   dyslipidemia, and cognitive slowing. The patient expressed   understanding. Alternatives to this medication were also   discussed with the patient, including risks of not taking   medications, and the patient was agreeable to the above choice.    Patient does not want to be inpatient mental health unit as she   said \" I would like to be discharged, and celebrate my upcoming   birthday with my family and friends\" patient states she have a   therapy in which she see weekly basis, and that she have   psychiatrist which she sees regularly.  Writer attempted to call   patient his significant other Newton twice number on the chart was   wrong.     Patient grossly appears to be cognitively intact. Insight and   judgement have improved since admission. Patient is aware of   consequences of medication non-compliance .  Patient has not   exhibited aggressive or violence behaviors since prior to this   admission. Has notable risk factors for self-harm, including   substance use and trauma, past history of attempt. However, risk   is mitigated by ability to volunteer a safety plan and history of   seeking help when needed. Patient does not have immediate access   to firearms. Therefore, based on all available evidence including   the factors cited above, she does not appear to be at imminent   risk for self-harm, does not meet criteria for a 72-hr hold, and   therefore remains appropriate for ongoing outpatient level of   care. Patient agreed to further reduce risk of self-harm by   adhering to the safety plan and agreed to remain medication   adherent. Additional steps taken to minimize risk include:   medication optimization, " "close psychiatric follow up and   provision of crisis resources. Patient expressed understanding of   risk associated with medication non-adherence including increased   risk of harm to self or others.       I have reviewed the nursing notes. I have reviewed the findings,   diagnosis, plan and need for follow up with the patient.         HPI:     Per ED provider Dr. Rakesh Pettit note  Prasanth Nova is a   47 year old female with past medical history significant for   major depressive disorder with psychotic features who presents to   the emergency department tonight with her boyfriend with concerns   for hallucinations and emotional instability.     In speaking with the patient in private, she tells me that over   the last several weeks she has been having difficulties with   decreased appetite, feeling unsafe at home, and passive thoughts   of suicidality.  Patient states to me that she has no current   plan, and has no history of attempted suicide in the past.  She   states that she does not want to harm herself.  Patient tells me   that she is having some auditory hallucinations which she tells   me is \"her own voice\", which is giving her difficulties in giving   her stress.  Patient states that she feels she is a burden to her   boyfriend, and is keeping her boyfriend up at night due to her   anxiety and stress.  Patient states that she feels emotionally   unstable at home.  She has been taking her medications as   prescribed, per EMR and patient report patient is on risperidone   0.5 mg twice daily as well as Remeron 15 mg nightly.  Per   boyfriend patient has been on this now for about 3 weeks.    Patient tells me she is not using any recreational drugs or   alcohol.  Patient states she has been admitted in the past for   mental health difficulties, would like to be admitted today.     In private patient's boyfriend tells me that they were seen   earlier this morning by a provider who manages patient's " psych   medications.  At that point patient had reported hallucinations,   auditory, and she was prompted to come to the ED right away for   further evaluation recommended admission.        Pt has not required locked seclusion or restraints in the past   24 hours to maintain safety, please refer to RN documentation for   further details.  Substance use does not appear to be playing a contributing role   in the patient's presentation.  Brief Therapeutic Intervention(s):   Provided active listening, unconditional positive regard, and   validation. Engaged in cognitive restructuring/ reframing, looked   at common cognitive distortions and challenged negative thoughts.   Engaged in guided discovery, explored patient's perspectives and   helped expand them through socratic dialogue. Provided positive   reinforcement for progress towards goals, gains in knowledge, and   application of skills previously taught.  Engaged in social   skills training. Explored and identified early warning signs to   anger.        Past Psychiatric History:   2019 where she was admitted for 1 week for depression with   psychosis at [place]        Substance Use and History:     311 (F32.9) Unspecified Depressive Disorder - by history  300.02 (F41.1) Generalized Anxiety Disorder - by history  Bipolar type II      Past Medical History:   PAST MEDICAL HISTORY:   Past Medical History:   Diagnosis Date     Anxiety      Depressive disorder      Uncomplicated asthma        PAST SURGICAL HISTORY:   Past Surgical History:   Procedure Laterality Date      SECTION       ORTHOPEDIC SURGERY                 Allergies:     Allergies   Allergen Reactions     Peanut (Diagnostic)      Other reaction(s): Throat Swelling/Closing  Tight throat     Nuts      Tight throat     Shrimp      Tight throat             Medications:     I have reviewed this patient's current medications  Current Facility-Administered Medications   Medication     ARIPiprazole  (ABILIFY) tablet 5 mg     hydrOXYzine (ATARAX) tablet 25 mg     lamoTRIgine (LaMICtal) tablet 25 mg    Followed by     [START ON 3/17/2023] lamoTRIgine (LaMICtal) tablet 50 mg     traZODone (DESYREL) half-tab 25-50 mg     Current Outpatient Medications   Medication Sig     hydrOXYzine (ATARAX) 25 MG tablet Take 25 mg by mouth every   morning     sertraline (ZOLOFT) 25 MG tablet Take 1 tablet by mouth every   morning              Family History:   FAMILY HISTORY:   Family History   Problem Relation Age of Onset     Depression Mother      Depression Sister            Social History:   SOCIAL HISTORY:   Social History     Tobacco Use     Smoking status: Never     Smokeless tobacco: Never   Substance Use Topics     Alcohol use: Yes     Comment: occasionally            PTA Medications:   (Not in a hospital admission)         Allergies:     Allergies   Allergen Reactions     Peanut (Diagnostic)      Other reaction(s): Throat Swelling/Closing  Tight throat     Nuts      Tight throat     Shrimp      Tight throat          Labs:   No results found for this or any previous visit (from the past 48   hour(s)).       Physical and Psychiatric Examination:     /77   Pulse 81   Temp 97.9  F (36.6  C) (Oral)   Resp 16     LMP 02/06/2023   SpO2 98%   Weight is 0 lbs 0 oz  There is no height or weight on file to   calculate BMI.    Mental Status Exam:  Appearance: awake, alert  Attitude:  cooperative  Eye Contact:  good  Mood:  better  Affect:  appropriate and in normal range  Speech:  clear, coherent  Language: fluent and intact in English  Psychomotor, Gait, Musculoskeletal:  no evidence of tardive   dyskinesia, dystonia, or tics  Thought Process:  logical and linear  Associations:  no loose associations  Thought Content:  no evidence of suicidal ideation or homicidal   ideation  Insight:  good  Judgement:  intact  Oriented to:  time, person, and place  Attention Span and Concentration:  intact  Recent and Remote Memory:   intact  Fund of Knowledge:  appropriate         Diagnoses:      Hallucinations  Suicidal thoughts         Recommendations:   1.  DC per [facility] and the ED provider, patient can follow her   outpatient psychiatry and therapy, or [facility] program  2.  Start Abilify 5 mg daily 1 month supply, lamotrigine 25 mg   for 14 days and 50 mg for 14 days, trazodone 50 mg for 1 month   supply  at bedtime  3.  Outpatient psychiatry and therapy  4.  Consult psychiatry as needed    - Consulted with  [facility] Kimberly DOUGLAS [facility] ED physician Dr. Paez,   patient's nurse , Yuliana MELENDEZ regarding this case.    Please call [facility]/DEC at [phone] if you have follow-up   questions or wish to place another consult.  Karla Stahl, Psychiatric Nurse practitioner    Attestation:  Time with:  Patient: 30  minutes  Treatment Team: 20 Minutes  Chart Review: 30 minutes    Total time spent was 80 minutes. Over 50% of times was spent   counseling and coordination of care.    I, Karla Stahl, CNP, APRN, Psychiatric Nurse Practitioner have   personally performed an examination of this patient.  I have   edited the note to reflect all relevant changes.  I have   discussed this patient with the care team March 3, 2023.  I have   reviewed all vitals and laboratory findings.    Disclaimer: This note consists of symbols derived from   keyboarding,    ;             MD Philippe Giron Dung Hoang, MD  03/03/23 6158

## 2023-03-03 NOTE — ED NOTES
Pt is alert, calm and cooperative this morning. Pt denies SI/HI currently. Denies hallucinations, no evidence of delusions. She ate breakfast in her room and has been resting quietly. Denies pain or discomfort. Pt reports being ready and feels she will be safe for discharge to home today.

## 2023-04-06 ENCOUNTER — NURSE TRIAGE (OUTPATIENT)
Dept: NURSING | Facility: CLINIC | Age: 48
End: 2023-04-06
Payer: COMMERCIAL

## 2023-04-06 ENCOUNTER — HOSPITAL ENCOUNTER (EMERGENCY)
Facility: CLINIC | Age: 48
Discharge: HOME | End: 2023-04-06
Attending: EMERGENCY MEDICINE | Admitting: EMERGENCY MEDICINE
Payer: COMMERCIAL

## 2023-04-06 ENCOUNTER — APPOINTMENT (OUTPATIENT)
Dept: CT IMAGING | Facility: CLINIC | Age: 48
End: 2023-04-06
Attending: EMERGENCY MEDICINE
Payer: COMMERCIAL

## 2023-04-06 VITALS
BODY MASS INDEX: 29.73 KG/M2 | DIASTOLIC BLOOD PRESSURE: 51 MMHG | TEMPERATURE: 97.5 F | HEIGHT: 66 IN | HEART RATE: 78 BPM | OXYGEN SATURATION: 99 % | SYSTOLIC BLOOD PRESSURE: 109 MMHG | RESPIRATION RATE: 16 BRPM | WEIGHT: 185 LBS

## 2023-04-06 DIAGNOSIS — R07.9 CHEST PAIN, UNSPECIFIED TYPE: ICD-10-CM

## 2023-04-06 LAB
ALBUMIN SERPL BCG-MCNC: 4.1 G/DL (ref 3.5–5.2)
ALP SERPL-CCNC: 72 U/L (ref 35–104)
ALT SERPL W P-5'-P-CCNC: 22 U/L (ref 10–35)
ANION GAP SERPL CALCULATED.3IONS-SCNC: 13 MMOL/L (ref 7–15)
AST SERPL W P-5'-P-CCNC: 22 U/L (ref 10–35)
ATRIAL RATE - MUSE: 73 BPM
BILIRUB DIRECT SERPL-MCNC: <0.2 MG/DL (ref 0–0.3)
BILIRUB SERPL-MCNC: <0.2 MG/DL
BUN SERPL-MCNC: 13.4 MG/DL (ref 6–20)
CALCIUM SERPL-MCNC: 9 MG/DL (ref 8.6–10)
CHLORIDE SERPL-SCNC: 101 MMOL/L (ref 98–107)
CREAT SERPL-MCNC: 0.79 MG/DL (ref 0.51–0.95)
D DIMER PPP FEU-MCNC: 0.81 UG/ML FEU (ref 0–0.5)
DEPRECATED HCO3 PLAS-SCNC: 22 MMOL/L (ref 22–29)
DIASTOLIC BLOOD PRESSURE - MUSE: NORMAL MMHG
ERYTHROCYTE [DISTWIDTH] IN BLOOD BY AUTOMATED COUNT: 14.9 % (ref 10–15)
FLUAV RNA SPEC QL NAA+PROBE: NEGATIVE
FLUBV RNA RESP QL NAA+PROBE: NEGATIVE
GFR SERPL CREATININE-BSD FRML MDRD: >90 ML/MIN/1.73M2
GLUCOSE SERPL-MCNC: 111 MG/DL (ref 70–99)
HCG SERPL QL: NEGATIVE
HCT VFR BLD AUTO: 38.7 % (ref 35–47)
HGB BLD-MCNC: 12.2 G/DL (ref 11.7–15.7)
HOLD SPECIMEN: NORMAL
HOLD SPECIMEN: NORMAL
INTERPRETATION ECG - MUSE: NORMAL
LIPASE SERPL-CCNC: 24 U/L (ref 13–60)
MCH RBC QN AUTO: 26.6 PG (ref 26.5–33)
MCHC RBC AUTO-ENTMCNC: 31.5 G/DL (ref 31.5–36.5)
MCV RBC AUTO: 85 FL (ref 78–100)
P AXIS - MUSE: 52 DEGREES
PLATELET # BLD AUTO: 345 10E3/UL (ref 150–450)
POTASSIUM SERPL-SCNC: 4.2 MMOL/L (ref 3.4–5.3)
PR INTERVAL - MUSE: 124 MS
PROT SERPL-MCNC: 7.2 G/DL (ref 6.4–8.3)
QRS DURATION - MUSE: 74 MS
QT - MUSE: 376 MS
QTC - MUSE: 414 MS
R AXIS - MUSE: 20 DEGREES
RBC # BLD AUTO: 4.58 10E6/UL (ref 3.8–5.2)
RSV RNA SPEC NAA+PROBE: NEGATIVE
SARS-COV-2 RNA RESP QL NAA+PROBE: NEGATIVE
SODIUM SERPL-SCNC: 136 MMOL/L (ref 136–145)
SYSTOLIC BLOOD PRESSURE - MUSE: NORMAL MMHG
T AXIS - MUSE: 17 DEGREES
TROPONIN T SERPL HS-MCNC: <6 NG/L
VENTRICULAR RATE- MUSE: 73 BPM
WBC # BLD AUTO: 10.1 10E3/UL (ref 4–11)

## 2023-04-06 PROCEDURE — 82248 BILIRUBIN DIRECT: CPT | Performed by: EMERGENCY MEDICINE

## 2023-04-06 PROCEDURE — C9803 HOPD COVID-19 SPEC COLLECT: HCPCS

## 2023-04-06 PROCEDURE — 85027 COMPLETE CBC AUTOMATED: CPT | Performed by: EMERGENCY MEDICINE

## 2023-04-06 PROCEDURE — 83690 ASSAY OF LIPASE: CPT | Performed by: EMERGENCY MEDICINE

## 2023-04-06 PROCEDURE — 250N000011 HC RX IP 250 OP 636: Performed by: EMERGENCY MEDICINE

## 2023-04-06 PROCEDURE — 84703 CHORIONIC GONADOTROPIN ASSAY: CPT | Performed by: EMERGENCY MEDICINE

## 2023-04-06 PROCEDURE — 96375 TX/PRO/DX INJ NEW DRUG ADDON: CPT

## 2023-04-06 PROCEDURE — 96374 THER/PROPH/DIAG INJ IV PUSH: CPT | Mod: 59

## 2023-04-06 PROCEDURE — 84484 ASSAY OF TROPONIN QUANT: CPT | Performed by: EMERGENCY MEDICINE

## 2023-04-06 PROCEDURE — 99285 EMERGENCY DEPT VISIT HI MDM: CPT | Mod: CS,25

## 2023-04-06 PROCEDURE — 80053 COMPREHEN METABOLIC PANEL: CPT | Performed by: EMERGENCY MEDICINE

## 2023-04-06 PROCEDURE — 93005 ELECTROCARDIOGRAM TRACING: CPT

## 2023-04-06 PROCEDURE — 85379 FIBRIN DEGRADATION QUANT: CPT | Performed by: EMERGENCY MEDICINE

## 2023-04-06 PROCEDURE — 87637 SARSCOV2&INF A&B&RSV AMP PRB: CPT | Performed by: EMERGENCY MEDICINE

## 2023-04-06 PROCEDURE — 36415 COLL VENOUS BLD VENIPUNCTURE: CPT | Performed by: EMERGENCY MEDICINE

## 2023-04-06 PROCEDURE — 71275 CT ANGIOGRAPHY CHEST: CPT

## 2023-04-06 RX ORDER — IOPAMIDOL 755 MG/ML
500 INJECTION, SOLUTION INTRAVASCULAR ONCE
Status: COMPLETED | OUTPATIENT
Start: 2023-04-06 | End: 2023-04-06

## 2023-04-06 RX ORDER — FAMOTIDINE 20 MG/1
20 TABLET, FILM COATED ORAL 2 TIMES DAILY
Qty: 20 TABLET | Refills: 0 | Status: SHIPPED | OUTPATIENT
Start: 2023-04-06 | End: 2023-04-16

## 2023-04-06 RX ORDER — LORAZEPAM 2 MG/ML
0.5 INJECTION INTRAMUSCULAR ONCE
Status: COMPLETED | OUTPATIENT
Start: 2023-04-06 | End: 2023-04-06

## 2023-04-06 RX ADMIN — LORAZEPAM 0.5 MG: 2 INJECTION INTRAMUSCULAR; INTRAVENOUS at 03:49

## 2023-04-06 RX ADMIN — IOPAMIDOL 64 ML: 755 INJECTION, SOLUTION INTRAVENOUS at 05:05

## 2023-04-06 RX ADMIN — FAMOTIDINE 20 MG: 10 INJECTION, SOLUTION INTRAVENOUS at 03:49

## 2023-04-06 ASSESSMENT — ENCOUNTER SYMPTOMS
FEVER: 0
SHORTNESS OF BREATH: 0
ABDOMINAL PAIN: 0
LIGHT-HEADEDNESS: 1
CHILLS: 0
VOMITING: 0
COUGH: 1
NAUSEA: 1
DIARRHEA: 0

## 2023-04-06 ASSESSMENT — ACTIVITIES OF DAILY LIVING (ADL): ADLS_ACUITY_SCORE: 35

## 2023-04-06 NOTE — ED TRIAGE NOTES
Chest pain for 1 week. Worse at night. Pt had flu symptoms for 3 weeks prior to that. Then the CP started nightly afterwards. Denies fevers.

## 2023-04-06 NOTE — TELEPHONE ENCOUNTER
Patient calling reports having intermittent chest pressure for the past few weeks. Reports pain will last longer than 5 minutes with the patient reporting intermittent difficulty breathing. Denies passing out, shock and confusion. Advised per protocol to call 911 for EMS with patient refusing. Reports her significant other will drive her to the ER.     Hina Sandoval RN 04/06/23 2:34 AM   [facility] Triage Nurse Advisor      Reason for Disposition    [1] Chest pain lasts > 5 minutes AND [2] age > 44    Additional Information    Negative: Passed out (i.e., lost consciousness, collapsed and was not responding)    Negative: Shock suspected (e.g., cold/pale/clammy skin, too weak to stand, low BP, rapid pulse)    Negative: Difficult to awaken or acting confused (e.g., disoriented, slurred speech)    Negative: SEVERE difficulty breathing (e.g., struggling for each breath, speaks in single words)    Protocols used: CHEST PAIN-A-AH

## 2023-04-06 NOTE — ED PROVIDER NOTES
History     Chief Complaint:  Chest Pain       HPI   Prasanth Nova is a 48 year old female with a history of asthma and anxiety who presents with chest pain.  Patient reports for the past few weeks having an upper respiratory infection.  She reports for the past week especially having midsternal chest pain that is nonradiating and worse at night.  She describes it as a pressure sensation that is worsened with deep inspiration.  She reports mild persistent nonproductive cough though denies any fever, chills, dyspnea, abdominal pain, vomiting, diarrhea.  She does report occasional lightheadedness and nausea during this time.  She has not taken anything for analgesia.  She denies any hormone use or history of blood clots.  No prolonged travel or immobilization.  No family history of early MI.  Patient's boyfriend reports he encouraged her to present to the ED today given consistency of symptoms.     Independent Historian:   Patient and boyfriend    Review of External Notes: 23 hallucinations/suicidal thoughts visit    ROS:  Review of Systems   Constitutional: Negative for chills and fever.   Respiratory: Positive for cough. Negative for shortness of breath.    Cardiovascular: Positive for chest pain.   Gastrointestinal: Positive for nausea. Negative for abdominal pain, diarrhea and vomiting.   Neurological: Positive for light-headedness.   All other systems reviewed and are negative.      Allergies:  Peanut (Diagnostic)  Nuts  Shrimp      Medications:    Fluticasone  Lorazepam  Mirtazapine  Risperidone  Albuterol     Past Medical History:    Anxiety  Depression  Asthma      Past Surgical History:     section   Orthopedic surgery    Family History:    family history includes Depression in her mother and sister.    Social History:   reports that she has never smoked. She has never used smokeless tobacco. She reports current alcohol use. She reports that she does not use drugs.  PCP: [facility]  "Tsering Yao     Physical Exam     Patient Vitals for the past 24 hrs:   BP Temp Temp src Pulse Resp SpO2 Height Weight   04/06/23 0515 118/63 -- -- 83 16 98 % -- --   04/06/23 0313 131/84 97.5  F (36.4  C) Temporal 90 16 100 % 1.676 m (5' 6\") 83.9 kg (185 lb)        Physical Exam  Nursing note and vitals reviewed.  Constitutional: Well nourished.  Eyes: Conjunctiva normal.  Pupils are equal, round, and reactive to light.   ENT: Nose normal. Mucous membranes pink and moist.    Neck: Normal range of motion.  CVS: Normal rate, regular rhythm.  Normal heart sounds.  No murmur.  Pulmonary: Lungs clear to auscultation bilaterally. No wheezes/rales/rhonchi.  GI: Abdomen soft. Nontender, nondistended. No rigidity or guarding.    MSK: No calf tenderness or swelling.  Neuro: Alert. Follows simple commands.  Skin: Skin is warm and dry. No rash noted.   Psychiatric: Mildly anxious appearing       Emergency Department Course   ECG  ECG taken at 0318, ECG read at 0320  Normal sinus rhythm with sinus arrhythmia  Normal ECG   Rate 73 bpm. MD interval 124 ms. QRS duration 74 ms. QT/QTc 376/414 ms. P-R-T axes 52 20 17.    Imaging:  CT Chest Pulmonary Embolism w Contrast   Final Result   IMPRESSION:   1.  There is no pulmonary embolus, aortic aneurysm or dissection. No acute abnormality.         Report per radiology    Laboratory:  Labs Ordered and Resulted from Time of ED Arrival to Time of ED Departure   BASIC METABOLIC PANEL - Abnormal       Result Value    Sodium 136      Potassium 4.2      Chloride 101      Carbon Dioxide (CO2) 22      Anion Gap 13      Urea Nitrogen 13.4      Creatinine 0.79      Calcium 9.0      Glucose 111 (*)     GFR Estimate >90     D DIMER QUANTITATIVE - Abnormal    D-Dimer Quantitative 0.81 (*)    CBC WITH PLATELETS - Normal    WBC Count 10.1      RBC Count 4.58      Hemoglobin 12.2      Hematocrit 38.7      MCV 85      MCH 26.6      MCHC 31.5      RDW 14.9      Platelet Count 345     TROPONIN T, HIGH " SENSITIVITY - Normal    Troponin T, High Sensitivity <6     INFLUENZA A/B, RSV, & SARS-COV2 PCR - Normal    Influenza A PCR Negative      Influenza B PCR Negative      RSV PCR Negative      SARS CoV2 PCR Negative     HEPATIC FUNCTION PANEL - Normal    Protein Total 7.2      Albumin 4.1      Bilirubin Total <0.2      Alkaline Phosphatase 72      AST 22      ALT 22      Bilirubin Direct <0.20     LIPASE - Normal    Lipase 24     HCG QUALITATIVE PREGNANCY - Normal    hCG Serum Qualitative Negative          Emergency Department Course & Assessments:    Interventions:  Medications   LORazepam (ATIVAN) injection 0.5 mg (0.5 mg Intravenous $Given 4/6/23 0349)   famotidine (PEPCID) injection 20 mg (20 mg Intravenous $Given 4/6/23 0349)   iopamidol (ISOVUE-370) solution 500 mL (64 mLs Intravenous $Given 4/6/23 0505)   sodium chloride (PF) 0.9% PF flush 100 mL (80 mLs Intravenous $Given 4/6/23 0505)        Assessments:  0345 I obtained history and examined the patient, as noted above.  0530 I rechecked and updated the patient.    Independent Interpretation (X-rays, CTs, rhythm strip):  None    Consultations/Discussion of Management or Tests:  None     Social Determinants of Health affecting care:   None    Disposition:  The patient was discharged to home.     Impression & Plan      Medical Decision Making:  Patient is a 48-year-old female presenting with predominately complaints of chest pain.  She is nontoxic on arrival though does appear mildly anxious.  She underwent extensive work-up during her time in the ED.  EKG without focal ischemia or underlying arrhythmia.  Delta troponin negative.  Her presentation would be atypical of ACS given symptoms greater than 6 hours.  D-dimer elevated so she did undergo formal CT which is fortunately without evidence of PE, pneumonia, pneumothorax or widened mediastinum.  Labs without profound anemia or significant electrolyte derangements.  LFTs/lipase reassuring.  She has no abdominal  tenderness and I doubt intra-abdominal catastrophe to explain her presentation.  She was given IV Pepcid in addition to Ativan which she reported significant symptom improvement.  I do suspect some component of underlying anxiety may be contributing to patient's presentation though I did discuss that I cannot exclude possible underlying dyspepsia as well which is contributing to her presentation.  I will initiate Pepcid on discharge.  Dietary recommendations discussed.  Planning close PCP follow-up.  Return precautions given.  All questions addressed and patient agreeable to plan of care.    Diagnosis:    ICD-10-CM    1. Chest pain, unspecified type  R07.9            Discharge Medications:  New Prescriptions    FAMOTIDINE (PEPCID) 20 MG TABLET    Take 1 tablet (20 mg) by mouth 2 times daily for 10 days        Scribe Disclosure:  Missael CHAVEZ, am serving as a scribe at 3:19 AM on 4/6/2023 to document services personally performed by Zuly Juarez DO based on my observations and the provider's statements to me.       Zuly Juarez DO  04/06/23 3160

## 2023-05-06 ENCOUNTER — HEALTH MAINTENANCE LETTER (OUTPATIENT)
Age: 48
End: 2023-05-06

## 2024-03-01 ENCOUNTER — OFFICE VISIT (OUTPATIENT)
Dept: INTERNAL MEDICINE | Facility: CLINIC | Age: 49
End: 2024-03-01
Payer: COMMERCIAL

## 2024-03-01 VITALS
BODY MASS INDEX: 28.73 KG/M2 | SYSTOLIC BLOOD PRESSURE: 112 MMHG | WEIGHT: 178.8 LBS | HEIGHT: 66 IN | DIASTOLIC BLOOD PRESSURE: 72 MMHG | RESPIRATION RATE: 16 BRPM | TEMPERATURE: 97.8 F | OXYGEN SATURATION: 97 % | HEART RATE: 120 BPM

## 2024-03-01 DIAGNOSIS — F32.5 MAJOR DEPRESSIVE DISORDER WITH SINGLE EPISODE, IN FULL REMISSION (H): ICD-10-CM

## 2024-03-01 DIAGNOSIS — Z00.00 ROUTINE GENERAL MEDICAL EXAMINATION AT A HEALTH CARE FACILITY: Primary | ICD-10-CM

## 2024-03-01 DIAGNOSIS — K21.9 GASTROESOPHAGEAL REFLUX DISEASE, UNSPECIFIED WHETHER ESOPHAGITIS PRESENT: ICD-10-CM

## 2024-03-01 DIAGNOSIS — Z12.11 SCREEN FOR COLON CANCER: ICD-10-CM

## 2024-03-01 DIAGNOSIS — Z12.4 CERVICAL CANCER SCREENING: ICD-10-CM

## 2024-03-01 DIAGNOSIS — Z12.31 VISIT FOR SCREENING MAMMOGRAM: ICD-10-CM

## 2024-03-01 PROBLEM — R44.3 HALLUCINATION: Status: RESOLVED | Noted: 2018-05-04 | Resolved: 2024-03-01

## 2024-03-01 PROCEDURE — 87624 HPV HI-RISK TYP POOLED RSLT: CPT | Performed by: INTERNAL MEDICINE

## 2024-03-01 PROCEDURE — G0145 SCR C/V CYTO,THINLAYER,RESCR: HCPCS | Performed by: INTERNAL MEDICINE

## 2024-03-01 PROCEDURE — 90471 IMMUNIZATION ADMIN: CPT | Performed by: INTERNAL MEDICINE

## 2024-03-01 PROCEDURE — 90715 TDAP VACCINE 7 YRS/> IM: CPT | Performed by: INTERNAL MEDICINE

## 2024-03-01 PROCEDURE — 99386 PREV VISIT NEW AGE 40-64: CPT | Mod: 25 | Performed by: INTERNAL MEDICINE

## 2024-03-01 PROCEDURE — 90472 IMMUNIZATION ADMIN EACH ADD: CPT | Performed by: INTERNAL MEDICINE

## 2024-03-01 PROCEDURE — 99213 OFFICE O/P EST LOW 20 MIN: CPT | Mod: 25 | Performed by: INTERNAL MEDICINE

## 2024-03-01 PROCEDURE — 90686 IIV4 VACC NO PRSV 0.5 ML IM: CPT | Performed by: INTERNAL MEDICINE

## 2024-03-01 SDOH — HEALTH STABILITY: PHYSICAL HEALTH: ON AVERAGE, HOW MANY DAYS PER WEEK DO YOU ENGAGE IN MODERATE TO STRENUOUS EXERCISE (LIKE A BRISK WALK)?: 6 DAYS

## 2024-03-01 ASSESSMENT — PATIENT HEALTH QUESTIONNAIRE - PHQ9
SUM OF ALL RESPONSES TO PHQ QUESTIONS 1-9: 4
SUM OF ALL RESPONSES TO PHQ QUESTIONS 1-9: 4
10. IF YOU CHECKED OFF ANY PROBLEMS, HOW DIFFICULT HAVE THESE PROBLEMS MADE IT FOR YOU TO DO YOUR WORK, TAKE CARE OF THINGS AT HOME, OR GET ALONG WITH OTHER PEOPLE: SOMEWHAT DIFFICULT

## 2024-03-01 ASSESSMENT — SOCIAL DETERMINANTS OF HEALTH (SDOH): HOW OFTEN DO YOU GET TOGETHER WITH FRIENDS OR RELATIVES?: MORE THAN THREE TIMES A WEEK

## 2024-03-01 NOTE — PROGRESS NOTES
"Preventive Care Visit  [facility]  Milli Frias MD, Internal Medicine  Mar 1, 2024    Assessment & Plan     Routine general medical examination at a health care facility  Patient encouraged to complete fasting lab work.  Received Tdap and flu shot in office today.  - Lipid panel reflex to direct LDL Fasting; Future  - Comprehensive metabolic panel; Future  - CBC with platelets; Future  - TSH with free T4 reflex; Future    Gastroesophageal reflux disease, unspecified whether esophagitis present  Epigastric discomfort with acid reflux.  Has been using Actal Tums as needed for pain symptoms.  Discussed on initiation of omeprazole medication.  Use twice daily and follow-up in 6 weeks.  If symptoms are still persistent, can consider GI referral for possible endoscopy.  - omeprazole (PRILOSEC) 20 MG DR capsule; Take 1 capsule (20 mg) by mouth 2 times daily    Major depressive disorder with single episode, in full remission (H24)  Patient was following up with psychiatry team and was on multiple medications which patient stopped taking when symptoms improved.  Has been off medications for a while.  Has not followed up with the psychiatry team either.  Recommendation to follow-up with the psychiatry team for reevaluation.    Screen for colon cancer  - Colonoscopy Screening  Referral; Future    Visit for screening mammogram  - MA SCREENING DIGITAL BILAT - Future  (s+30); Future    Cervical cancer screening  - Pap Screen with HPV - recommended age 30 - 65 years    Patient has been advised of split billing requirements and indicates understanding: Yes          BMI  Estimated body mass index is 28.86 kg/m  as calculated from the following:    Height as of this encounter: 1.676 m (5' 6\").    Weight as of this encounter: 81.1 kg (178 lb 12.8 oz).       Counseling  Appropriate preventive services were discussed with this patient, including applicable screening as appropriate for fall prevention, " nutrition, physical activity, Tobacco-use cessation, weight loss and cognition.  Checklist reviewing preventive services available has been given to the patient.  Reviewed patient's diet, addressing concerns and/or questions.   She is at risk for psychosocial distress and has been provided with information to reduce risk.           Mk Rader is a 48 year old, presenting for the following:  Physical (Stomach pain almost a year )        3/1/2024    10:16 AM   Additional Questions   Roomed by Yeti   Accompanied by Self        Health Care Directive  Patient does not have a Health Care Directive or Living Will: Discussed advance care planning with patient; however, patient declined at this time.    HPIAnswers submitted by the patient for this visit:  Patient Health Questionnaire (Submitted on 3/1/2024)  If you checked off any problems, how difficult have these problems made it for you to do your work, take care of things at home, or get along with other people?: Somewhat difficult  PHQ9 TOTAL SCORE: 4      3/1/2024   General Health   How would you rate your overall physical health? (!) FAIR   Feel stress (tense, anxious, or unable to sleep) Only a little   (!) STRESS CONCERN      3/1/2024   Nutrition   Three or more servings of calcium each day? (!) NO   Diet: Regular (no restrictions)   How many servings of fruit and vegetables per day? (!) 0-1   How many sweetened beverages each day? (!) 3         3/1/2024   Exercise   Days per week of moderate/strenous exercise 6 days         3/1/2024   Social Factors   Frequency of gathering with friends or relatives More than three times a week   Worry food won't last until get money to buy more No   Food not last or not have enough money for food? No   Do you have housing?  Yes   Are you worried about losing your housing? No   Lack of transportation? No   Unable to get utilities (heat,electricity)? No         3/1/2024   Dental   Dentist two times every year? (!) DECLINE          3/1/2024   TB Screening   Were you born outside of [place]?  No       Today's PHQ-9 Score:       3/1/2024    10:14 AM   PHQ-9 SCORE   PHQ-9 Total Score MyChart 4 (Minimal depression)   PHQ-9 Total Score 4         3/1/2024   Substance Use   Alcohol more than 3/day or more than 7/wk No   Do you use any other substances recreationally? No     Social History     Tobacco Use    Smoking status: Never    Smokeless tobacco: Never   Substance Use Topics    Alcohol use: Yes     Comment: occasionally    Drug use: No          Mammogram Screening - Mammogram every 1-2 years updated in Health Maintenance based on mutual decision making          3/1/2024   One time HIV Screening   Previous HIV test? No         3/1/2024   STI Screening   New sexual partner(s) since last STI/HIV test? (!) DECLINE     History of abnormal Pap smear: NO - age 30-65 PAP every 5 years with negative HPV co-testing recommended        Latest Ref Rng & Units 3/11/2019     4:10 PM 3/11/2019     4:04 PM   PAP / HPV   PAP (Historical)   NIL    HPV 16 DNA NEG^Negative Negative     HPV 18 DNA NEG^Negative Negative     Other HR HPV NEG^Negative Negative       ASCVD Risk   The 10-year ASCVD risk score (Sally AVILES, et al., 2019) is: 0.5%    Values used to calculate the score:      Age: 48 years      Sex: Female      Is Non- : No      Diabetic: No      Tobacco smoker: No      Systolic Blood Pressure: 112 mmHg      Is BP treated: No      HDL Cholesterol: 53 mg/dL      Total Cholesterol: 169 mg/dL        3/1/2024   Contraception/Family Planning   Questions about contraception or family planning No        Reviewed and updated as needed this visit by Provider                          Review of Systems  Constitutional, HEENT, cardiovascular, pulmonary, gi and gu systems are negative, except as otherwise noted.     Objective    Exam  /72 (BP Location: Right arm, Patient Position: Sitting, Cuff Size: Adult Regular)   Pulse 120    "Temp 97.8  F (36.6  C) (Tympanic)   Resp 16   Ht 1.676 m (5' 6\")   Wt 81.1 kg (178 lb 12.8 oz)   LMP 02/20/2024 (Exact Date)   SpO2 97%   BMI 28.86 kg/m     Estimated body mass index is 28.86 kg/m  as calculated from the following:    Height as of this encounter: 1.676 m (5' 6\").    Weight as of this encounter: 81.1 kg (178 lb 12.8 oz).    Physical Exam  GENERAL: alert and no distress  EYES: Eyes grossly normal to inspection, PERRL and conjunctivae and sclerae normal  HENT: ear canals and TM's normal, nose and mouth without ulcers or lesions  RESP: lungs clear to auscultation - no rales, rhonchi or wheezes  BREAST: normal without masses, tenderness or nipple discharge and no palpable axillary masses or adenopathy  CV: regular rate and rhythm, normal S1 S2  ABDOMEN: soft, nontender, no hepatosplenomegaly, no masses.  MS: no gross musculoskeletal defects noted, no edema  NEURO: Normal strength and tone, mentation intact and speech normal  PSYCH: mentation appears normal, affect normal/bright      Signed Electronically by: Milli Frias MD    "

## 2024-03-05 LAB
BKR LAB AP GYN ADEQUACY: NORMAL
BKR LAB AP GYN INTERPRETATION: NORMAL
BKR LAB AP HPV REFLEX: NORMAL
BKR LAB AP PREVIOUS ABNORMAL: NORMAL
PATH REPORT.COMMENTS IMP SPEC: NORMAL
PATH REPORT.COMMENTS IMP SPEC: NORMAL
PATH REPORT.RELEVANT HX SPEC: NORMAL

## 2024-03-07 LAB
HUMAN PAPILLOMA VIRUS 16 DNA: NEGATIVE
HUMAN PAPILLOMA VIRUS 18 DNA: NEGATIVE
HUMAN PAPILLOMA VIRUS FINAL DIAGNOSIS: NORMAL
HUMAN PAPILLOMA VIRUS OTHER HR: NEGATIVE

## 2025-03-26 ENCOUNTER — OFFICE VISIT (OUTPATIENT)
Dept: INTERNAL MEDICINE | Facility: CLINIC | Age: 50
End: 2025-03-26
Payer: COMMERCIAL

## 2025-03-26 VITALS
WEIGHT: 174.5 LBS | TEMPERATURE: 98 F | BODY MASS INDEX: 28.04 KG/M2 | HEART RATE: 99 BPM | SYSTOLIC BLOOD PRESSURE: 104 MMHG | DIASTOLIC BLOOD PRESSURE: 65 MMHG | OXYGEN SATURATION: 100 % | HEIGHT: 66 IN | RESPIRATION RATE: 20 BRPM

## 2025-03-26 DIAGNOSIS — Z13.1 SCREENING FOR DIABETES MELLITUS (DM): ICD-10-CM

## 2025-03-26 DIAGNOSIS — R10.13 EPIGASTRIC PAIN: ICD-10-CM

## 2025-03-26 DIAGNOSIS — Z12.11 SCREEN FOR COLON CANCER: ICD-10-CM

## 2025-03-26 DIAGNOSIS — E66.3 OVERWEIGHT (BMI 25.0-29.9): ICD-10-CM

## 2025-03-26 DIAGNOSIS — Z13.0 SCREENING, IRON DEFICIENCY ANEMIA: ICD-10-CM

## 2025-03-26 DIAGNOSIS — Z00.00 PREVENTATIVE HEALTH CARE: Primary | ICD-10-CM

## 2025-03-26 DIAGNOSIS — Z12.31 VISIT FOR SCREENING MAMMOGRAM: ICD-10-CM

## 2025-03-26 LAB
ALBUMIN SERPL BCG-MCNC: 4.4 G/DL (ref 3.5–5.2)
ALP SERPL-CCNC: 71 U/L (ref 40–150)
ALT SERPL W P-5'-P-CCNC: 15 U/L (ref 0–50)
ANION GAP SERPL CALCULATED.3IONS-SCNC: 11 MMOL/L (ref 7–15)
AST SERPL W P-5'-P-CCNC: 19 U/L (ref 0–45)
BILIRUB SERPL-MCNC: 0.2 MG/DL
BUN SERPL-MCNC: 13.2 MG/DL (ref 6–20)
CALCIUM SERPL-MCNC: 9.9 MG/DL (ref 8.8–10.4)
CHLORIDE SERPL-SCNC: 103 MMOL/L (ref 98–107)
CHOLEST SERPL-MCNC: 220 MG/DL
CREAT SERPL-MCNC: 0.88 MG/DL (ref 0.51–0.95)
EGFRCR SERPLBLD CKD-EPI 2021: 81 ML/MIN/1.73M2
ERYTHROCYTE [DISTWIDTH] IN BLOOD BY AUTOMATED COUNT: 14.4 % (ref 10–15)
EST. AVERAGE GLUCOSE BLD GHB EST-MCNC: 120 MG/DL
FASTING STATUS PATIENT QL REPORTED: YES
FASTING STATUS PATIENT QL REPORTED: YES
GLUCOSE SERPL-MCNC: 96 MG/DL (ref 70–99)
HBA1C MFR BLD: 5.8 % (ref 0–5.6)
HCO3 SERPL-SCNC: 25 MMOL/L (ref 22–29)
HCT VFR BLD AUTO: 38.1 % (ref 35–47)
HDLC SERPL-MCNC: 52 MG/DL
HGB BLD-MCNC: 12.6 G/DL (ref 11.7–15.7)
LDLC SERPL CALC-MCNC: 138 MG/DL
MCH RBC QN AUTO: 27.1 PG (ref 26.5–33)
MCHC RBC AUTO-ENTMCNC: 33.1 G/DL (ref 31.5–36.5)
MCV RBC AUTO: 82 FL (ref 78–100)
NONHDLC SERPL-MCNC: 168 MG/DL
PLATELET # BLD AUTO: 344 10E3/UL (ref 150–450)
POTASSIUM SERPL-SCNC: 4.3 MMOL/L (ref 3.4–5.3)
PROT SERPL-MCNC: 7.5 G/DL (ref 6.4–8.3)
RBC # BLD AUTO: 4.65 10E6/UL (ref 3.8–5.2)
SODIUM SERPL-SCNC: 139 MMOL/L (ref 135–145)
TRIGL SERPL-MCNC: 150 MG/DL
WBC # BLD AUTO: 9.3 10E3/UL (ref 4–11)

## 2025-03-26 PROCEDURE — 99396 PREV VISIT EST AGE 40-64: CPT | Performed by: STUDENT IN AN ORGANIZED HEALTH CARE EDUCATION/TRAINING PROGRAM

## 2025-03-26 PROCEDURE — 99214 OFFICE O/P EST MOD 30 MIN: CPT | Mod: 25 | Performed by: STUDENT IN AN ORGANIZED HEALTH CARE EDUCATION/TRAINING PROGRAM

## 2025-03-26 PROCEDURE — 83036 HEMOGLOBIN GLYCOSYLATED A1C: CPT | Performed by: STUDENT IN AN ORGANIZED HEALTH CARE EDUCATION/TRAINING PROGRAM

## 2025-03-26 PROCEDURE — 85027 COMPLETE CBC AUTOMATED: CPT | Performed by: STUDENT IN AN ORGANIZED HEALTH CARE EDUCATION/TRAINING PROGRAM

## 2025-03-26 PROCEDURE — 3074F SYST BP LT 130 MM HG: CPT | Performed by: STUDENT IN AN ORGANIZED HEALTH CARE EDUCATION/TRAINING PROGRAM

## 2025-03-26 PROCEDURE — 3078F DIAST BP <80 MM HG: CPT | Performed by: STUDENT IN AN ORGANIZED HEALTH CARE EDUCATION/TRAINING PROGRAM

## 2025-03-26 PROCEDURE — 80061 LIPID PANEL: CPT | Performed by: STUDENT IN AN ORGANIZED HEALTH CARE EDUCATION/TRAINING PROGRAM

## 2025-03-26 PROCEDURE — 36415 COLL VENOUS BLD VENIPUNCTURE: CPT | Performed by: STUDENT IN AN ORGANIZED HEALTH CARE EDUCATION/TRAINING PROGRAM

## 2025-03-26 PROCEDURE — 80053 COMPREHEN METABOLIC PANEL: CPT | Performed by: STUDENT IN AN ORGANIZED HEALTH CARE EDUCATION/TRAINING PROGRAM

## 2025-03-26 RX ORDER — OMEPRAZOLE 20 MG/1
20 CAPSULE, DELAYED RELEASE ORAL 2 TIMES DAILY
Qty: 60 CAPSULE | Refills: 0 | Status: SHIPPED | OUTPATIENT
Start: 2025-03-26 | End: 2025-04-25

## 2025-03-26 SDOH — HEALTH STABILITY: PHYSICAL HEALTH: ON AVERAGE, HOW MANY DAYS PER WEEK DO YOU ENGAGE IN MODERATE TO STRENUOUS EXERCISE (LIKE A BRISK WALK)?: 6 DAYS

## 2025-03-26 ASSESSMENT — PATIENT HEALTH QUESTIONNAIRE - PHQ9
SUM OF ALL RESPONSES TO PHQ QUESTIONS 1-9: 6
SUM OF ALL RESPONSES TO PHQ QUESTIONS 1-9: 6
10. IF YOU CHECKED OFF ANY PROBLEMS, HOW DIFFICULT HAVE THESE PROBLEMS MADE IT FOR YOU TO DO YOUR WORK, TAKE CARE OF THINGS AT HOME, OR GET ALONG WITH OTHER PEOPLE: SOMEWHAT DIFFICULT

## 2025-03-26 ASSESSMENT — SOCIAL DETERMINANTS OF HEALTH (SDOH): HOW OFTEN DO YOU GET TOGETHER WITH FRIENDS OR RELATIVES?: ONCE A WEEK

## 2025-03-26 NOTE — PROGRESS NOTES
"Preventive Care Visit  [facility]  Ramon Abraham MD, Internal Medicine  Mar 26, 2025      Assessment & Plan     (Z00.00) Preventative health care  (primary encounter diagnosis)  - Diet and exercise appropriate for age  - No significant alcohol use, no tobacco use  - Cancer screening as below  Plan: Lipid panel reflex to direct LDL Fasting    (R10.13) Epigastric pain  - Ongoing for about 1 year  - Has epigastric discomfort with acidic foods. No significant nausea or vomiting, denies melena or hematochezia. No reported significant alcohol use or history of gallstones  - Has been on omeprazole on and off for over a year  Plan: Adult GI  Referral - Consult Only,         omeprazole (PRILOSEC) 20 MG DR capsule,         Comprehensive metabolic panel (BMP + Alb, Alk         Phos, ALT, AST, Total. Bili, TP)    (E66.3) Overweight (BMI 25.0-29.9)  - Counselled her weight gain may be due to dietary changes with her acid reflux and being perimenopausal  - Would like to discuss weight guidance with weight management team  Plan: Adult Comprehensive Weight Management         Referral, Lipid panel reflex to direct LDL         Fasting      (Z13.0) Screening, iron deficiency anemia  - No excessive bleeding reported  Plan: CBC with platelets    (Z13.1) Screening for diabetes mellitus (DM)  - Elevated weight and hyperglycemic on labs in the past  Plan: Hemoglobin A1c    (Z12.31) Visit for screening mammogram  Plan: MA Screening Bilateral w/ Sukhjinder    (Z12.11) Screen for colon cancer  Plan: Colonoscopy Screening  Referral        BMI  Estimated body mass index is 28.17 kg/m  as calculated from the following:    Height as of this encounter: 1.676 m (5' 6\").    Weight as of this encounter: 79.2 kg (174 lb 8 oz).   Weight management plan: Patient referred to endocrine and/or weight management specialty    Counseling  Appropriate preventive services were addressed with this patient via " screening, questionnaire, or discussion as appropriate for fall prevention, nutrition, physical activity, Tobacco-use cessation, social engagement, weight loss and cognition.  Checklist reviewing preventive services available has been given to the patient.  Reviewed patient's diet, addressing concerns and/or questions.   She is at risk for psychosocial distress and has been provided with information to reduce risk.   The patient's PHQ-9 score is consistent with mild depression. She was provided with information regarding depression.         Mk Rader is a 50 year old, presenting for the following:  Physical and stomach issue (She was diagnosis with stomach ulcer about 2-3 years ago. The issue is still bothering her. She has stomach issue and acid reflux after eating most of the time and sometime at night when she is lying down.)        3/26/2025     1:10 PM   Additional Questions   Roomed by IVONNE Brown   Accompanied by Self          HPI  50F here for annual physical and acute concerns.  Patient's main concern is regarding epigastric discomfort that is been ongoing for over a year.  She was first evaluated for this about a year ago and given omeprazole which helped her symptoms while she was taking it, lab work at that time was normal.  Since then she is still noted on and off epigastric discomfort with burning sensation with specific foods.  She has tried modifying her diet to no significant improvement.  She denies any significant  nausea, vomiting, hematemesis, melena, or hematochezia.  She has never had a endoscopy or colonoscopy in the past.  She denies any significant alcohol use, no tobacco use.  She is working on diet and exercise for weight loss, but reports despite this her weight has been increasing.  She does appear to be perimenopausal.        Advance Care Planning  Patient does not have a Health Care Directive: Advance Directive received and scanned. Click on Code in the patient header to  view.      3/26/2025   General Health   How would you rate your overall physical health? (!) FAIR   Feel stress (tense, anxious, or unable to sleep) To some extent   (!) STRESS CONCERN      3/26/2025   Nutrition   Three or more servings of calcium each day? (!) NO   Diet: Regular (no restrictions)   How many servings of fruit and vegetables per day? (!) 0-1   How many sweetened beverages each day? 0-1         3/26/2025   Exercise   Days per week of moderate/strenous exercise 6 days         3/26/2025   Social Factors   Frequency of gathering with friends or relatives Once a week   Worry food won't last until get money to buy more No   Food not last or not have enough money for food? No   Do you have housing? (Housing is defined as stable permanent housing and does not include staying ouside in a car, in a tent, in an abandoned building, in an overnight shelter, or couch-surfing.) No   Are you worried about losing your housing? No   Lack of transportation? No   Unable to get utilities (heat,electricity)? No   Want help with housing or utility concern? No   (!) HOUSING CONCERN PRESENT      3/26/2025   Dental   Dentist two times every year? Yes           3/1/2024   TB Screening   Were you born outside of the [place]? No         Today's PHQ-9 Score:       3/26/2025    12:57 PM   PHQ-9 SCORE   PHQ-9 Total Score MyChart 6 (Mild depression)   PHQ-9 Total Score 6        Patient-reported         3/26/2025   Substance Use   Alcohol more than 3/day or more than 7/wk No   Do you use any other substances recreationally? No     Social History     Tobacco Use    Smoking status: Never    Smokeless tobacco: Never   Vaping Use    Vaping status: Never Used   Substance Use Topics    Alcohol use: Yes     Comment: occasionally    Drug use: No           3/1/2024   LAST FHS-7 RESULTS   1st degree relative breast or ovarian cancer Unknown   Any relative bilateral breast cancer Unknown   Any male have breast cancer Unknown   Any ONE woman have  "BOTH breast AND ovarian cancer Unknown   Any woman with breast cancer before 50yrs Yes   2 or more relatives with breast AND/OR ovarian cancer Unknown   2 or more relatives with breast AND/OR bowel cancer Unknown     Mammogram Screening - Mammogram every 1-2 years updated in Health Maintenance based on mutual decision making          3/26/2025   One time HIV Screening   Previous HIV test? Yes         3/26/2025   STI Screening   New sexual partner(s) since last STI/HIV test? No     History of abnormal Pap smear: No - age 30- 64 PAP with HPV every 5 years recommended        Latest Ref Rng & Units 3/1/2024    10:52 AM 3/11/2019     4:10 PM 3/11/2019     4:04 PM   PAP / HPV   PAP  Negative for Intraepithelial Lesion or Malignancy (NILM)      PAP (Historical)    NIL    HPV 16 DNA Negative Negative  Negative     HPV 18 DNA Negative Negative  Negative     Other HR HPV Negative Negative  Negative       ASCVD Risk   The ASCVD Risk score (Sally AVILES, et al., 2019) failed to calculate for the following reasons:    The systolic blood pressure is missing    Cannot find a previous HDL lab    Cannot find a previous total cholesterol lab        3/26/2025   Contraception/Family Planning   Questions about contraception or family planning No         Past Medical History:   Diagnosis Date    Anxiety     Depressive disorder     Hallucination     Uncomplicated asthma      Past Surgical History:   Procedure Laterality Date     SECTION      ORTHOPEDIC SURGERY           Review of Systems  Constitutional, neuro, ENT, endocrine, pulmonary, cardiac, gastrointestinal, genitourinary, musculoskeletal, integument and psychiatric systems are negative, except as otherwise noted.     Objective    Exam  LMP 2025 (Exact Date)    Estimated body mass index is 28.86 kg/m  as calculated from the following:    Height as of 3/1/24: 1.676 m (5' 6\").    Weight as of 3/1/24: 81.1 kg (178 lb 12.8 oz).    Physical Exam  Vitals reviewed. "   Constitutional:       Appearance: Normal appearance.   HENT:      Head: Atraumatic.   Eyes:      Extraocular Movements: Extraocular movements intact.   Cardiovascular:      Rate and Rhythm: Normal rate and regular rhythm.   Pulmonary:      Breath sounds: Normal breath sounds.   Abdominal:      Palpations: Abdomen is soft.   Musculoskeletal:         General: Normal range of motion.      Cervical back: Normal range of motion.   Skin:     General: Skin is warm.   Neurological:      General: No focal deficit present.   Psychiatric:         Mood and Affect: Mood normal.               Signed Electronically by: Ramon Abraham MD

## 2025-03-27 ENCOUNTER — PATIENT OUTREACH (OUTPATIENT)
Dept: CARE COORDINATION | Facility: CLINIC | Age: 50
End: 2025-03-27
Payer: COMMERCIAL

## 2025-04-08 ENCOUNTER — ANCILLARY PROCEDURE (OUTPATIENT)
Dept: MAMMOGRAPHY | Facility: CLINIC | Age: 50
End: 2025-04-08
Attending: STUDENT IN AN ORGANIZED HEALTH CARE EDUCATION/TRAINING PROGRAM
Payer: COMMERCIAL

## 2025-04-08 DIAGNOSIS — Z12.31 VISIT FOR SCREENING MAMMOGRAM: ICD-10-CM

## 2025-04-08 PROCEDURE — 77067 SCR MAMMO BI INCL CAD: CPT | Mod: TC | Performed by: RADIOLOGY

## 2025-04-08 PROCEDURE — 77063 BREAST TOMOSYNTHESIS BI: CPT | Mod: TC | Performed by: RADIOLOGY

## 2025-05-14 ENCOUNTER — TELEPHONE (OUTPATIENT)
Dept: ENDOCRINOLOGY | Facility: CLINIC | Age: 50
End: 2025-05-14
Payer: COMMERCIAL

## 2025-05-14 NOTE — TELEPHONE ENCOUNTER
Left Voicemail (1st Attempt) and Sent Mychart (1st Attempt) for the patient to call back and schedule the following:    Appointment type: New Weight Management  Appointment mode: In Person or Virtual Visit  Provider: Next available provider  Return date: Approx. Next available  Specialty phone number: [phone]    Additional Notes: Reschedule 7/11/25 with Fabricio      Appointment type: New Med WT MGMT Nutrition  Appointment mode: Virtual Visit  Provider: Maryuri Cortés or Katlyn Nava  Return date: Approx. Next after above shceduled  Specialty phone number: [phone]    Additional Notes:   Reschedule 7/11/25 with Raquel

## 2025-05-21 ENCOUNTER — PATIENT OUTREACH (OUTPATIENT)
Dept: CARE COORDINATION | Facility: CLINIC | Age: 50
End: 2025-05-21
Payer: COMMERCIAL